# Patient Record
Sex: FEMALE | Race: WHITE | NOT HISPANIC OR LATINO | Employment: OTHER | URBAN - METROPOLITAN AREA
[De-identification: names, ages, dates, MRNs, and addresses within clinical notes are randomized per-mention and may not be internally consistent; named-entity substitution may affect disease eponyms.]

---

## 2017-01-25 ENCOUNTER — CONVERSION ENCOUNTER (OUTPATIENT)
Dept: MAMMOGRAPHY | Facility: HOSPITAL | Age: 67
End: 2017-01-25

## 2018-02-15 ENCOUNTER — CONVERSION ENCOUNTER (OUTPATIENT)
Dept: MAMMOGRAPHY | Facility: HOSPITAL | Age: 68
End: 2018-02-15

## 2018-04-20 ENCOUNTER — OFFICE VISIT CONVERTED (OUTPATIENT)
Dept: CARDIOLOGY | Facility: CLINIC | Age: 68
End: 2018-04-20
Attending: SPECIALIST

## 2018-11-16 ENCOUNTER — CONVERSION ENCOUNTER (OUTPATIENT)
Dept: OTHER | Facility: HOSPITAL | Age: 68
End: 2018-11-16

## 2018-11-16 ENCOUNTER — OFFICE VISIT CONVERTED (OUTPATIENT)
Dept: CARDIOLOGY | Facility: CLINIC | Age: 68
End: 2018-11-16
Attending: SPECIALIST

## 2019-03-26 ENCOUNTER — HOSPITAL ENCOUNTER (OUTPATIENT)
Dept: MAMMOGRAPHY | Facility: HOSPITAL | Age: 69
Discharge: HOME | End: 2019-03-26
Attending: OBSTETRICS & GYNECOLOGY

## 2019-04-16 ENCOUNTER — HOSPITAL ENCOUNTER (OUTPATIENT)
Dept: MAMMOGRAPHY | Facility: HOSPITAL | Age: 69
Discharge: HOME | End: 2019-04-16
Attending: OBSTETRICS & GYNECOLOGY

## 2019-04-22 ENCOUNTER — HOSPITAL ENCOUNTER (OUTPATIENT)
Dept: ULTRASOUND IMAGING | Facility: HOSPITAL | Age: 69
Discharge: HOME | End: 2019-04-22
Attending: OBSTETRICS & GYNECOLOGY

## 2019-04-26 ENCOUNTER — TELEPHONE (OUTPATIENT)
Dept: SURGERY | Facility: CLINIC | Age: 69
End: 2019-04-26

## 2019-04-26 DIAGNOSIS — D05.12 DUCTAL CARCINOMA IN SITU (DCIS) OF LEFT BREAST: Primary | ICD-10-CM

## 2019-04-26 NOTE — TELEPHONE ENCOUNTER
LM for pt to call,    New PT appt with Dr OWUSU  5/3/19 arrive 7:30am    Need to pre screen for possible breast mri      Mailed sarthak nayak

## 2019-04-26 NOTE — TELEPHONE ENCOUNTER
LM for pt to call    Scheduled Bilateral Breast MRI w wo contrast [facility] 4-29-19  Arrive 5:15pm    nael

## 2019-04-29 ENCOUNTER — HOSPITAL ENCOUNTER (OUTPATIENT)
Dept: MRI IMAGING | Facility: HOSPITAL | Age: 69
Discharge: HOME | End: 2019-04-29
Admitting: SURGERY

## 2019-04-29 DIAGNOSIS — D05.12 DUCTAL CARCINOMA IN SITU (DCIS) OF LEFT BREAST: ICD-10-CM

## 2019-04-29 PROCEDURE — C8908 MRI W/O FOL W/CONT, BREAST,: HCPCS

## 2019-04-29 PROCEDURE — A9577 INJ MULTIHANCE: HCPCS | Performed by: SURGERY

## 2019-04-29 PROCEDURE — C8937 CAD BREAST MRI: HCPCS

## 2019-04-29 PROCEDURE — 0 GADOBENATE DIMEGLUMINE 529 MG/ML SOLUTION: Performed by: SURGERY

## 2019-04-29 RX ADMIN — GADOBENATE DIMEGLUMINE 18 ML: 529 INJECTION, SOLUTION INTRAVENOUS at 18:34

## 2019-05-03 ENCOUNTER — PREP FOR SURGERY (OUTPATIENT)
Dept: OTHER | Facility: HOSPITAL | Age: 69
End: 2019-05-03

## 2019-05-03 ENCOUNTER — OFFICE VISIT (OUTPATIENT)
Dept: SURGERY | Facility: CLINIC | Age: 69
End: 2019-05-03

## 2019-05-03 VITALS
SYSTOLIC BLOOD PRESSURE: 172 MMHG | OXYGEN SATURATION: 97 % | BODY MASS INDEX: 35.78 KG/M2 | HEART RATE: 91 BPM | HEIGHT: 61 IN | DIASTOLIC BLOOD PRESSURE: 92 MMHG | WEIGHT: 189.5 LBS

## 2019-05-03 DIAGNOSIS — N64.89 BREAST ASYMMETRY: ICD-10-CM

## 2019-05-03 DIAGNOSIS — D05.12 DUCTAL CARCINOMA IN SITU OF LEFT BREAST: Primary | ICD-10-CM

## 2019-05-03 DIAGNOSIS — D05.12 DUCTAL CARCINOMA IN SITU (DCIS) OF LEFT BREAST: Primary | ICD-10-CM

## 2019-05-03 DIAGNOSIS — E66.9 OBESITY (BMI 35.0-39.9 WITHOUT COMORBIDITY): ICD-10-CM

## 2019-05-03 DIAGNOSIS — D05.12 BREAST NEOPLASM, TIS (DCIS), LEFT: Primary | ICD-10-CM

## 2019-05-03 DIAGNOSIS — Z80.3 FH: BREAST CANCER: ICD-10-CM

## 2019-05-03 DIAGNOSIS — I35.1 NONRHEUMATIC AORTIC VALVE INSUFFICIENCY: ICD-10-CM

## 2019-05-03 PROCEDURE — 99205 OFFICE O/P NEW HI 60 MIN: CPT | Performed by: SURGERY

## 2019-05-03 RX ORDER — SODIUM CHLORIDE, SODIUM LACTATE, POTASSIUM CHLORIDE, CALCIUM CHLORIDE 600; 310; 30; 20 MG/100ML; MG/100ML; MG/100ML; MG/100ML
100 INJECTION, SOLUTION INTRAVENOUS CONTINUOUS
Status: CANCELLED | OUTPATIENT
Start: 2019-06-13

## 2019-05-03 RX ORDER — MULTIPLE VITAMINS W/ MINERALS TAB 9MG-400MCG
1 TAB ORAL DAILY
COMMUNITY
End: 2019-06-13 | Stop reason: HOSPADM

## 2019-05-03 RX ORDER — ASPIRIN 81 MG/1
81 TABLET ORAL DAILY
COMMUNITY
End: 2019-06-13 | Stop reason: HOSPADM

## 2019-05-03 RX ORDER — SIMVASTATIN 40 MG
40 TABLET ORAL NIGHTLY
COMMUNITY
Start: 2019-02-14 | End: 2022-05-12

## 2019-05-03 RX ORDER — DIAZEPAM 5 MG/1
10 TABLET ORAL ONCE
Status: CANCELLED | OUTPATIENT
Start: 2019-06-13 | End: 2019-05-03

## 2019-05-03 RX ORDER — AMLODIPINE BESYLATE 2.5 MG/1
5 TABLET ORAL NIGHTLY
COMMUNITY
Start: 2019-02-14 | End: 2019-07-03

## 2019-05-03 RX ORDER — CEFAZOLIN SODIUM 2 G/100ML
2 INJECTION, SOLUTION INTRAVENOUS ONCE
Status: CANCELLED | OUTPATIENT
Start: 2019-06-13 | End: 2019-05-03

## 2019-05-03 NOTE — PROGRESS NOTES
Chief Complaint: Melina Martin is a 68 y.o. female who was seen in consultation at the request of Mere Pena DO  for newly diagnosed breast cancer    History of Present Illness:  Patient presents with newly diagnosed DCIS, LEFT breast.  She noted no new masses, skin changes, nipple discharge, nipple changes prior to her most recent imaging.    Her most recent imaging includes the followin/15/2018  [facility]  [place]  SCREENING BILATERAL MAMOGRAM WITH 3D TOMOSYNTHESIS  BIRADS 1 NEGATIVE  Scattered areas of fibroglandular density.    [facility]  [facility]ENING BILATERAL MAMOGRAM WITH 3D TOMOSYNTHESIS  FINDINGS:  5 mm asymmetry lower left breast, middle depth.  BIRADS 0  Scattered areas of fibroglandular density.    2019  [facility]  [place]  DIAGNOSTIC LEFT ANGELICA  US BREAST LEFT  FINDINGS:  Mammogram: Persistence of a 0.7 cm, high density, bandlike asymmetry in the medial left breast.  Ultrasound: Left breast 9 o’clock, 3 cm from nipple is a 0.7 x 0.9 cm elongated duct with internal debris that correlates with the mammographic lesion.  BI-RADS CATEGORY: 4A    19  [facility]  BILA BREAST MRI   MELINA MARTIN   Left breast 9 o’clock middle third 4 cm from the nipple metallic clip seen along the medial margin of the residual area clumped enhancement 1.4 cm anterior to posterior, 0.7 cm superior to inferior and 0.6 cm medial to lateral. No other areas left breast. No evidence for left axillary or internal mammary chain adenopathy. No areas of abnormal enhancement right breast.       She had a biopsy on the following day that showed:  2019  [facility]   Radiology  Melina [place]  ULTRASOUND BREAST BX  9 o’clock anterior medial left breast.  12 gauge Encor.  3 core biopsy specimens. A ribbon shaped clip. Positioning of the clip was confirmed.  ADDENDUM:  Tiny focus of possible microinvasive carcinoma. Result is  concordant.    04/22/2019  [facility]   Pathology  Melina [place]  Low grade ductal carcinoma in situ (DCIS), see comment:  - Size of largest focus: 4 mm  - Architectural patterns: Cribriform and mucinous  - Necrosis: Not identified  - Microcalcification: Not identified  o Estrogen receptor - 100%  o Progesterone receptor - 100%  A tiny focus of possible microinvasive mucinous carcinoma is present on the initial two levels examined inly. Deeper levels additionally demonstrate rare fragments of detached carcinoma within pools of mucin. These findings may represent mucinous carcinoma.    She has not had a breast biopsy in the past.  She has her uterus and ovaries, is postmenopausal, and takes nor hormones.  Her family history includes the following: She has 2 daughters, no sons, no sisters, 2 maternal aunts, no paternal aunts.  One maternal aunt had breast cancer in her 80s.  Her mother lived to be 101 and did not have malignancy.  She is here for evaluation.    Review of Systems:  Review of Systems   Eyes: Positive for eye problems.   Respiratory: Positive for wheezing.    Endocrine: Positive for hot flashes.   All other systems reviewed and are negative.       Past Medical and Surgical History:  Breast Biopsy History:  Patient had not had a breast biopsy prior to her cancer diagnosis.  Breast Cancer HIstory:  Patient does not have a past medical history of breast cancer.  Breast Operations, and year:  NONE   Obstetric/Gynecologic History:  Age menstrual periods began: 13  Patient is postmenopausal, entered menopause naturally at age: 7/2000   Number of pregnancies:2  Number of live births: 2  Number of abortions or miscarriages: 0  Age of delivery of first child: 28  Patient breast fed, for the following lenth of time: APPROX 1 YR   Length of time taking birth control pills: 7-10 YRS   Patient has never taken hormone replacement  PATIENT HAS BOTH OVARIES AND UTERUS.     Past Surgical History:   Procedure  "Laterality Date   • BREAST BIOPSY     •  SECTION     • OVARIAN CYST REMOVAL         Past Medical History:   Diagnosis Date   • Cataract    • Heart murmur    • HTN (hypertension)    • Hyperlipidemia        Prior Hospitalizations, other than for surgery or childbirth, and year:  NONE     Social History     Socioeconomic History   • Marital status: Single     Spouse name: Not on file   • Number of children: Not on file   • Years of education: Not on file   • Highest education level: Not on file   Tobacco Use   • Smoking status: Former Smoker     Types: Cigarettes     Last attempt to quit:      Years since quittin.3   • Smokeless tobacco: Never Used   Substance and Sexual Activity   • Alcohol use: Yes     Comment: occ. 1-2 month    • Drug use: No     Patient is .  Patient is retired.  Patient drinks 1-2 servings of caffeine per day.    Family History:  Family History   Problem Relation Age of Onset   • Colon cancer Mother 89   • Hearing loss Mother    • Hypertension Mother    • Hearing loss Father    • Heart attack Father    • Diabetes Brother    • Heart attack Brother    • Depression Daughter    • Breast cancer Maternal Aunt         UNKNOWN AGE    • Cancer Cousin         FEMALE CANCER OF UNKNOWN ORGIN 60S       Vital Signs:  /92 (BP Location: Right arm, Patient Position: Sitting, Cuff Size: Adult)   Pulse 91   Ht 154.3 cm (60.75\")   Wt 86 kg (189 lb 8 oz)   SpO2 97%   Breastfeeding? No   BMI 36.10 kg/m²      Medications:    Current Outpatient Medications:   •  amLODIPine (NORVASC) 2.5 MG tablet, , Disp: , Rfl:   •  aspirin 81 MG EC tablet, Take 81 mg by mouth Daily., Disp: , Rfl:   •  Biotin w/ Vitamins C & E (HAIR/SKIN/NAILS PO), Take  by mouth., Disp: , Rfl:   •  Coenzyme Q10 (CO Q 10 PO), Take  by mouth., Disp: , Rfl:   •  Misc Natural Products (OSTEO BI-FLEX/5-LOXIN ADVANCED PO), Take  by mouth., Disp: , Rfl:   •  Multiple Vitamins-Minerals (MULTIVITAMIN WITH MINERALS) tablet " "tablet, Take 1 tablet by mouth Daily., Disp: , Rfl:   •  Omega-3 Fatty Acids (OMEGA-3 FISH OIL PO), Take  by mouth. omegaplex, Disp: , Rfl:   •  simvastatin (ZOCOR) 40 MG tablet, , Disp: , Rfl:      Allergies:  Allergies   Allergen Reactions   • Codeine Other (See Comments)     Restless, unable to sleep       Physical Examination:  /92 (BP Location: Right arm, Patient Position: Sitting, Cuff Size: Adult)   Pulse 91   Ht 154.3 cm (60.75\")   Wt 86 kg (189 lb 8 oz)   SpO2 97%   Breastfeeding? No   BMI 36.10 kg/m²   General Appearance:  Patient is in no distress.  She is well kept and has an obese build.   Psychiatric:  Patient with appropriate mood and affect. Alert and oriented to self, time, and place.    Breast, RIGHT:  medium sized, asymmetric with the contralateral side, as below.  Breast skin is without erythema, edema, rashes.  There are no visible abnormalities upon inspection during the arm-raising maneuver or with hands on hips in the sitting position. There is no nipple retraction, discharge or nipple/areolar skin changes.There are no masses palpable in the sitting or supine positions.    Breast, LEFT:  small sized, asymmetric with the contralateral side, right breast is a 38B and left breast is a 38A.  There are mild ecchymoses left medial breast from her recent biopsy.  Breast skin is without erythema, edema, rashes.  There are no visible abnormalities upon inspection during the arm-raising maneuver or with hands on hips in the sitting position. There is no nipple retraction, discharge or nipple/areolar skin changes.There are no masses palpable in the sitting or supine positions.    Lymphatic:  There is no axillary, cervical, infraclavicular, or supraclavicular adenopathy bilaterally.  Eyes:  Pupils are round and reactive to light.  Cardiovascular:  Heart rate and rhythm are regular. There is a blowing murmur present.  Respiratory:  Lungs are clear bilaterally with no crackles or wheezes in any " lung field.  Gastrointestinal:  Abdomen is soft, nondistended, and nontender.     Musculoskeletal:  Good strength in all 4 extremities.   There is good range of motion in both shoulders.    Skin:  No new skin lesions or rashes on the skin excluding the breast (see breast exam above).        Imagin2017  [facility]  [place]  BIRADS 2 BENIGN  Scattered areas of fibroglandular density.    02/15/2018  [facility]  SCREENING BILATERAL MAMOGRAM WITH 3D TOMOSYNTHESIS  BIRADS 1 NEGATIVE  Scattered areas of fibroglandular density.    2019  [facility]  SCREENING BILATERAL MAMOGRAM WITH 3D TOMOSYNTHESIS  FINDINGS:  5 mm asymmetry lower left breast, middle depth.  BIRADS 0  Scattered areas of fibroglandular density.    2019  [facility]  DIAGNOSTIC LEFT ANGELICA  US BREAST LEFT  FINDINGS:  Mammogram: Persistence of a 0.7 cm, high density, bandlike asymmetry in the medial left breast.  Ultrasound: Left breast 9 o’clock, 3 cm from nipple is a 0.7 x 0.9 cm elongated duct with internal debris that correlates with the mammographic lesion.  BI-RADS CATEGORY: 4A    19  [facility]  BILA BREAST MRI   LIZBETH MELINA E   Left breast 9 o’clock middle third 4 cm from the nipple metallic clip seen along the medial margin of the residual area clumped enhancement 1.4 cm anterior to posterior, 0.7 cm superior to inferior and 0.6 cm medial to lateral. No other areas left breast. No evidence for left axillary or internal mammary chain adenopathy. No areas of abnormal enhancement right breast.     Pathology:  2019  [facility]   Radiology  [place]  ULTRASOUND BREAST BX  9 o’clock anterior medial left breast.  12 gauge Encor.  3 core biopsy specimens. A ribbon shaped clip. Positioning of the clip was confirmed.  ADDENDUM:  Tiny focus of possible microinvasive carcinoma. Result is concordant.    2019  [place]  [facility]   Pathology  Melina Martin  Low grade ductal carcinoma in situ (DCIS), see comment:  - Size of largest focus: 4 mm  - Architectural patterns: Cribriform and mucinous  - Necrosis: Not identified  - Microcalcification: Not identified  o Estrogen receptor - 100%  o Progesterone receptor - 100%  A tiny focus of possible microinvasive mucinous carcinoma is present on the initial two levels examined inly. Deeper levels additionally demonstrate rare fragments of detached carcinoma within pools of mucin. These findings may represent mucinous carcinoma.    Procedures:      Assessment:   Diagnosis Plan   1. Ductal carcinoma in situ (DCIS) of left breast  Ambulatory Referral to Plastic Surgery   2. FH: breast cancer     3. Obesity (BMI 35.0-39.9 without comorbidity)     4. Breast asymmetry  Ambulatory Referral to Plastic Surgery   5. Nonrheumatic aortic valve insufficiency       1-  LEFT breast 9:00, 3.5 CFN- 9mm US, 7mm mammogram, 1.4 cm MRI  Low-grade ductal carcinoma in situ, 4 mm and a core, could perform a mucinous, versus microinvasive mucinous carcinoma.  Estrogen 100%, progesterone 100%.  Ribbon marker left in good position.  Clinical stage T IS, N0 stage 0.    2-  1 of 2 MA in her 80s. Mother lived until 100 with no breast ca        3-  BMI 36    4-  RIGHT larger than LEFT by a cup- 38 B versus 38A    5-  Dr Jones in [facility] is cardiologist.    Plan:  Melina, her daughter and I reviewed her interval history, imaging, imaging reports, pathology reports and examination together today.  We reviewed the difference in systemic therapy versus locoregional. We discussed that she will be seeing a medical oncologist in the adjuvant setting. We discussed that for DCIS, that we would expect a 98% breast cancer specific survival and that the medications offered by medical oncology are for risk reduction for additional breast cancers and for a reduction in ipsilateral locoregional recurrence.     We discussed that  for unifocal DCIS, there are 2 options for locoregional treatment that have equivalent survival: total mastectomy versus lumpectomy and radiation, the former with sentinel node biopsy.     She is interested in breast conservation.     We discussed the option of oncoplastic closure with contralateral mastopexy, and she is  interested in this, in light of her baseline asymmetry, where the unaffected breast is larger.  We will arrange for her to talk with plastic surgery about an oncoplastic closure and a contralateral mastopexy for symmetry.      We discussed the following procedure:  Left breast needle localized lumpectomy, followed by bilateral oncoplastics.    She understands the risks of lumpectomy including bleeding, infection, seroma and 25% chance of positive margins.    We discussed that if she were found to have an invasive colloid tumor, that it is likely we would not return for sentinel nodes.  If there was a mixed or ductal cancer than we may.    NCCN guidelines have been followed.  We fitted her for a postoperative bra today.    She will receive a recommendation for radiation after surgery.    She is going to check and see if she would be interested in seeing Dr. Muniz postoperatively to discuss hormone blocking medication.  With regards to her being overweight we did discuss that with a heavily hormone fed tumor that we recommend weight loss as well as hormone blocking medication to reduce the risk of recurrence or second primary.    We did discuss her family history and the option for genetic testing and she is not interested in that at this time.    She is pleased with her excellent prognosis and we will get her set up for surgery.      Beatriz Fuentes MD    - addendum- note from her cardiologist Dr Hayes Jones in [place]-   Non-rheumatic aortic insufficiency, stable. Continue losartan, simvastatin.RTC 6mo    We have spent 60 minutes in visit today, 45 in face to face  .      Next Appointment:  Return for surgery.      EMR Dragon/transcription disclaimer:    Much of this encounter note is an electronic transcription/translocation of spoken language to printed text.  The electronic translation of spoken language may permit erroneous, or at times, nonsensical words or phrases to be inadvertently transcribed.  Although I have reviewed the note from such areas, some may still exist.

## 2019-05-09 ENCOUNTER — TRANSCRIBE ORDERS (OUTPATIENT)
Dept: SURGERY | Facility: CLINIC | Age: 69
End: 2019-05-09

## 2019-05-09 DIAGNOSIS — D05.12 DUCTAL CARCINOMA IN SITU OF LEFT BREAST: Primary | ICD-10-CM

## 2019-05-10 ENCOUNTER — TELEPHONE (OUTPATIENT)
Dept: SURGERY | Facility: CLINIC | Age: 69
End: 2019-05-10

## 2019-05-10 PROBLEM — D05.12 BREAST NEOPLASM, TIS (DCIS), LEFT: Status: ACTIVE | Noted: 2019-05-10

## 2019-05-10 NOTE — TELEPHONE ENCOUNTER
Scheduled Consult with Dr Trinidad  5-23-19 @ 8:00    Scheduled Surgery Main OR  5-29-19  Left Breast Needle Localized Lumpectomy, Possible Oncoplastics Dr Trinidad        Arrive            7:30  NL                9:30  Surgery      11:0.0      PAT 5-20-19 @ 1:30  Return to see Dr OWUSU 6-10-19 @ arrive 8:45      LM for pt to call me.  nael    Pt called and confirmed appts  nale

## 2019-05-20 ENCOUNTER — TELEPHONE (OUTPATIENT)
Dept: OTHER | Facility: HOSPITAL | Age: 69
End: 2019-05-20

## 2019-05-20 ENCOUNTER — APPOINTMENT (OUTPATIENT)
Dept: PREADMISSION TESTING | Facility: HOSPITAL | Age: 69
End: 2019-05-20

## 2019-05-20 DIAGNOSIS — D05.12 DUCTAL CARCINOMA IN SITU OF LEFT BREAST: ICD-10-CM

## 2019-05-20 LAB
ANION GAP SERPL CALCULATED.3IONS-SCNC: 11.5 MMOL/L
BUN BLD-MCNC: 11 MG/DL (ref 8–23)
BUN/CREAT SERPL: 15.3 (ref 7–25)
CALCIUM SPEC-SCNC: 9.1 MG/DL (ref 8.6–10.5)
CHLORIDE SERPL-SCNC: 99 MMOL/L (ref 98–107)
CO2 SERPL-SCNC: 28.5 MMOL/L (ref 22–29)
CREAT BLD-MCNC: 0.72 MG/DL (ref 0.57–1)
DEPRECATED RDW RBC AUTO: 41.2 FL (ref 37–54)
ERYTHROCYTE [DISTWIDTH] IN BLOOD BY AUTOMATED COUNT: 13.2 % (ref 12.3–15.4)
GFR SERPL CREATININE-BSD FRML MDRD: 81 ML/MIN/1.73
GLUCOSE BLD-MCNC: 204 MG/DL (ref 65–99)
HCT VFR BLD AUTO: 46.9 % (ref 34–46.6)
HGB BLD-MCNC: 15.2 G/DL (ref 12–15.9)
MCH RBC QN AUTO: 27.9 PG (ref 26.6–33)
MCHC RBC AUTO-ENTMCNC: 32.4 G/DL (ref 31.5–35.7)
MCV RBC AUTO: 86.2 FL (ref 79–97)
PLATELET # BLD AUTO: 300 10*3/MM3 (ref 140–450)
PMV BLD AUTO: 9.5 FL (ref 6–12)
POTASSIUM BLD-SCNC: 4.6 MMOL/L (ref 3.5–5.2)
RBC # BLD AUTO: 5.44 10*6/MM3 (ref 3.77–5.28)
SODIUM BLD-SCNC: 139 MMOL/L (ref 136–145)
WBC NRBC COR # BLD: 6.72 10*3/MM3 (ref 3.4–10.8)

## 2019-05-20 PROCEDURE — 85027 COMPLETE CBC AUTOMATED: CPT | Performed by: SURGERY

## 2019-05-20 PROCEDURE — 80048 BASIC METABOLIC PNL TOTAL CA: CPT | Performed by: SURGERY

## 2019-05-20 PROCEDURE — 93010 ELECTROCARDIOGRAM REPORT: CPT | Performed by: INTERNAL MEDICINE

## 2019-05-20 PROCEDURE — 36415 COLL VENOUS BLD VENIPUNCTURE: CPT

## 2019-05-20 PROCEDURE — 93005 ELECTROCARDIOGRAM TRACING: CPT

## 2019-05-20 RX ORDER — RANITIDINE HCL 75 MG
150 TABLET ORAL EVERY MORNING
COMMUNITY
End: 2020-11-04

## 2019-05-21 ENCOUNTER — TELEPHONE (OUTPATIENT)
Dept: OTHER | Facility: HOSPITAL | Age: 69
End: 2019-05-21

## 2019-05-21 ENCOUNTER — TELEPHONE (OUTPATIENT)
Dept: SURGERY | Facility: CLINIC | Age: 69
End: 2019-05-21

## 2019-05-21 NOTE — TELEPHONE ENCOUNTER
Referral received from Dr. Fuentes's office. I called Melina and introduced myself. She was in the middle of an appointment and could not talk. She asked if I could call back later. I will reach back out at a later time.

## 2019-05-21 NOTE — TELEPHONE ENCOUNTER
Preoperative EKG showed the following:  - ABNORMAL ECG -  Sinus rhythm  RBBB and LAFB  Left ventricular hypertrophy      We will contact her cardiologist Dr Rahman and fax over the EKG so he can have a look.

## 2019-05-21 NOTE — TELEPHONE ENCOUNTER
I called Melina back today and caught her at a good time. I introduced myself and navigational services. She states the plan is to have a lumpectomy on May 29th and will see radiation and medical oncology postoperatively in [place]. She had no questions or concerns with her treatment plan. She stated she felt very comfortable with Dr. Fuentes and was confident in her treatment plan.      We discussed integrative therapies and other services at [facility] including the opportunity to speak with our Oncology Dietitian or Oncology Social Worker. She verbalized interest in receiving a navigation folder outlining services. I verified her mailing address and will send out a navigation folder with the following information:     [facility] Cancer Support Network,  Sharing Our Stories Breast Cancer Support Group, Cancer and Restorative Exercise (CARE), [facility] Exercise program, Together for Breast Cancer Survival,  For Women Facing Breast Cancer, Bio[employer], Cancer Resource [place], Massage Therapy, Reiki Therapy, [employer] [place], Cancer Nutrition, Survivorship Clinic, [facility], Survivorship conference.      She stated she does not like to come to [place] unless she has to, but she was interested in learning about the resources anyway. She verbalized appreciation for navigational services and she has my contact information and will call with any questions that arise.

## 2019-05-22 ENCOUNTER — TELEPHONE (OUTPATIENT)
Dept: SURGERY | Facility: CLINIC | Age: 69
End: 2019-05-22

## 2019-05-22 NOTE — TELEPHONE ENCOUNTER
Requested Dr. Jones review abnormal pre-op EKG.     Carroll @ [phone] will call me back before end of week to confirm is or isn't okay to proceed. lml

## 2019-05-24 ENCOUNTER — TELEPHONE (OUTPATIENT)
Dept: SURGERY | Facility: CLINIC | Age: 69
End: 2019-05-24

## 2019-05-24 ENCOUNTER — TRANSCRIBE ORDERS (OUTPATIENT)
Dept: ADMINISTRATIVE | Facility: HOSPITAL | Age: 69
End: 2019-05-24

## 2019-05-24 DIAGNOSIS — C50.912 MALIGNANT NEOPLASM OF LEFT FEMALE BREAST, UNSPECIFIED ESTROGEN RECEPTOR STATUS, UNSPECIFIED SITE OF BREAST (HCC): Primary | ICD-10-CM

## 2019-05-24 NOTE — TELEPHONE ENCOUNTER
Received call from Padmini mcneil at Dr Jones office,     Patient is not cleared to have surgery next Wednesday-she has to have a stress test first. Dr Jones is out next week.

## 2019-05-24 NOTE — TELEPHONE ENCOUNTER
Patient surgery had to be rescheduled due to her having to have cardio work up.    Her surgery is now on  6-19-19 Main OR    Left Breast NL Lumpectomy, Possible Oncplastics Dr Trinidad      Arrive     5;30  NL          7:30  Surgery 9:00    PAT 6-10-19 @ 12:30    Return to see  H7-1-19 @ 11:30    Scheduled with Doreen Pope to [place] we tried to get case on 7-12-19  Radiology did not have a 7:30 NL available.  And Dr Trinidad has a long case that afternoon.      kdl

## 2019-05-28 ENCOUNTER — TELEPHONE (OUTPATIENT)
Dept: SURGERY | Facility: CLINIC | Age: 69
End: 2019-05-28

## 2019-05-28 NOTE — TELEPHONE ENCOUNTER
Message for patient to call me back   Need to know what pain medications she has tolerated in the past.     I need let her know that Dr. Fuentes recommends that she consider allowing Dr Trinidad to work with us, in light of her baseline asymmetry of her breast size.       Patient agreed to call me back. Lml         Patient let me know she has tolerate hydrocodone in the past.   She also let me know she is not willing to have plastics, she understands that we would like for her to consider for asymmetry and breast size. She tells me that she has considered this at length and it is not important to her.  Lml

## 2019-05-29 ENCOUNTER — APPOINTMENT (OUTPATIENT)
Dept: MAMMOGRAPHY | Facility: HOSPITAL | Age: 69
End: 2019-05-29

## 2019-05-29 ENCOUNTER — TELEPHONE (OUTPATIENT)
Dept: SURGERY | Facility: CLINIC | Age: 69
End: 2019-05-29

## 2019-05-29 NOTE — TELEPHONE ENCOUNTER
Patient's surgery has as been moved up,  Scheduled Left Breast Needle Localized Lumpectomy, 6-13-19 [facility]    Arrive         5:45  NL             7:30  Surgery    9:00    PAT 6-5-19 @ 9:30  Post Op 7-3-19 arrive 11:15    Patient is aware of all appts  kdl

## 2019-06-04 ENCOUNTER — TELEPHONE (OUTPATIENT)
Dept: SURGERY | Facility: CLINIC | Age: 69
End: 2019-06-04

## 2019-06-04 NOTE — TELEPHONE ENCOUNTER
Confirmed with Dr. Jones's office  Cardio stress test scheduled Thursday 6/6/19   If all well they will clear her for surgery same day.     I will check back with his office   [phone].     lml       Requested cardio clearance. Dr. Jones's office explained to me that patient having Echo today. He would review and let us know this afternoon. lml

## 2019-06-05 ENCOUNTER — APPOINTMENT (OUTPATIENT)
Dept: PREADMISSION TESTING | Facility: HOSPITAL | Age: 69
End: 2019-06-05

## 2019-06-05 VITALS
SYSTOLIC BLOOD PRESSURE: 153 MMHG | TEMPERATURE: 97 F | HEIGHT: 65 IN | HEART RATE: 96 BPM | RESPIRATION RATE: 16 BRPM | DIASTOLIC BLOOD PRESSURE: 84 MMHG | BODY MASS INDEX: 32.03 KG/M2 | WEIGHT: 192.25 LBS | OXYGEN SATURATION: 98 %

## 2019-06-10 ENCOUNTER — CONVERSION ENCOUNTER (OUTPATIENT)
Dept: CARDIOLOGY | Facility: CLINIC | Age: 69
End: 2019-06-10
Attending: SPECIALIST

## 2019-06-10 ENCOUNTER — APPOINTMENT (OUTPATIENT)
Dept: PREADMISSION TESTING | Facility: HOSPITAL | Age: 69
End: 2019-06-10

## 2019-06-13 ENCOUNTER — HOSPITAL ENCOUNTER (OUTPATIENT)
Dept: MAMMOGRAPHY | Facility: HOSPITAL | Age: 69
Discharge: HOME | End: 2019-06-13

## 2019-06-13 ENCOUNTER — ANESTHESIA (OUTPATIENT)
Dept: PERIOP | Facility: HOSPITAL | Age: 69
End: 2019-06-13

## 2019-06-13 ENCOUNTER — HOSPITAL ENCOUNTER (OUTPATIENT)
Facility: HOSPITAL | Age: 69
Setting detail: HOSPITAL OUTPATIENT SURGERY
Discharge: HOME | End: 2019-06-13
Attending: SURGERY | Admitting: SURGERY

## 2019-06-13 ENCOUNTER — APPOINTMENT (OUTPATIENT)
Dept: GENERAL RADIOLOGY | Facility: HOSPITAL | Age: 69
End: 2019-06-13

## 2019-06-13 ENCOUNTER — ANESTHESIA EVENT (OUTPATIENT)
Dept: PERIOP | Facility: HOSPITAL | Age: 69
End: 2019-06-13

## 2019-06-13 VITALS
DIASTOLIC BLOOD PRESSURE: 76 MMHG | OXYGEN SATURATION: 97 % | SYSTOLIC BLOOD PRESSURE: 126 MMHG | RESPIRATION RATE: 16 BRPM | TEMPERATURE: 98.5 F | HEART RATE: 86 BPM

## 2019-06-13 DIAGNOSIS — C50.912 MALIGNANT NEOPLASM OF LEFT FEMALE BREAST, UNSPECIFIED ESTROGEN RECEPTOR STATUS, UNSPECIFIED SITE OF BREAST (HCC): ICD-10-CM

## 2019-06-13 DIAGNOSIS — D05.12 BREAST NEOPLASM, TIS (DCIS), LEFT: ICD-10-CM

## 2019-06-13 PROCEDURE — 25010000002 PHENYLEPHRINE PER 1 ML: Performed by: NURSE ANESTHETIST, CERTIFIED REGISTERED

## 2019-06-13 PROCEDURE — 88342 IMHCHEM/IMCYTCHM 1ST ANTB: CPT | Performed by: SURGERY

## 2019-06-13 PROCEDURE — 19301 PARTIAL MASTECTOMY: CPT | Performed by: SURGERY

## 2019-06-13 PROCEDURE — 25010000003 CEFAZOLIN IN DEXTROSE 2-4 GM/100ML-% SOLUTION: Performed by: SURGERY

## 2019-06-13 PROCEDURE — 25010000002 ONDANSETRON PER 1 MG: Performed by: NURSE ANESTHETIST, CERTIFIED REGISTERED

## 2019-06-13 PROCEDURE — 76098 X-RAY EXAM SURGICAL SPECIMEN: CPT

## 2019-06-13 PROCEDURE — 25010000002 FENTANYL CITRATE (PF) 100 MCG/2ML SOLUTION: Performed by: NURSE ANESTHETIST, CERTIFIED REGISTERED

## 2019-06-13 PROCEDURE — 25010000002 PROPOFOL 10 MG/ML EMULSION: Performed by: NURSE ANESTHETIST, CERTIFIED REGISTERED

## 2019-06-13 PROCEDURE — 25010000002 DEXAMETHASONE PER 1 MG: Performed by: NURSE ANESTHETIST, CERTIFIED REGISTERED

## 2019-06-13 PROCEDURE — 88341 IMHCHEM/IMCYTCHM EA ADD ANTB: CPT | Performed by: SURGERY

## 2019-06-13 PROCEDURE — 25010000002 MIDAZOLAM PER 1 MG: Performed by: ANESTHESIOLOGY

## 2019-06-13 PROCEDURE — S0260 H&P FOR SURGERY: HCPCS | Performed by: SURGERY

## 2019-06-13 PROCEDURE — 88307 TISSUE EXAM BY PATHOLOGIST: CPT | Performed by: SURGERY

## 2019-06-13 RX ORDER — ONDANSETRON 2 MG/ML
4 INJECTION INTRAMUSCULAR; INTRAVENOUS ONCE AS NEEDED
Status: DISCONTINUED | OUTPATIENT
Start: 2019-06-13 | End: 2019-06-13 | Stop reason: HOSPADM

## 2019-06-13 RX ORDER — PROMETHAZINE HYDROCHLORIDE 25 MG/1
25 TABLET ORAL ONCE AS NEEDED
Status: DISCONTINUED | OUTPATIENT
Start: 2019-06-13 | End: 2019-06-13 | Stop reason: HOSPADM

## 2019-06-13 RX ORDER — DIAZEPAM 5 MG/1
10 TABLET ORAL ONCE
Status: COMPLETED | OUTPATIENT
Start: 2019-06-13 | End: 2019-06-13

## 2019-06-13 RX ORDER — MIDAZOLAM HYDROCHLORIDE 1 MG/ML
2 INJECTION INTRAMUSCULAR; INTRAVENOUS
Status: DISCONTINUED | OUTPATIENT
Start: 2019-06-13 | End: 2019-06-13 | Stop reason: HOSPADM

## 2019-06-13 RX ORDER — SODIUM CHLORIDE 0.9 % (FLUSH) 0.9 %
1-10 SYRINGE (ML) INJECTION AS NEEDED
Status: DISCONTINUED | OUTPATIENT
Start: 2019-06-13 | End: 2019-06-13 | Stop reason: HOSPADM

## 2019-06-13 RX ORDER — SODIUM CHLORIDE, SODIUM LACTATE, POTASSIUM CHLORIDE, CALCIUM CHLORIDE 600; 310; 30; 20 MG/100ML; MG/100ML; MG/100ML; MG/100ML
100 INJECTION, SOLUTION INTRAVENOUS CONTINUOUS
Status: DISCONTINUED | OUTPATIENT
Start: 2019-06-13 | End: 2019-06-13 | Stop reason: HOSPADM

## 2019-06-13 RX ORDER — PROMETHAZINE HYDROCHLORIDE 25 MG/1
25 SUPPOSITORY RECTAL ONCE AS NEEDED
Status: DISCONTINUED | OUTPATIENT
Start: 2019-06-13 | End: 2019-06-13 | Stop reason: HOSPADM

## 2019-06-13 RX ORDER — DEXAMETHASONE SODIUM PHOSPHATE 10 MG/ML
INJECTION INTRAMUSCULAR; INTRAVENOUS AS NEEDED
Status: DISCONTINUED | OUTPATIENT
Start: 2019-06-13 | End: 2019-06-13 | Stop reason: SURG

## 2019-06-13 RX ORDER — FAMOTIDINE 10 MG/ML
20 INJECTION, SOLUTION INTRAVENOUS ONCE
Status: COMPLETED | OUTPATIENT
Start: 2019-06-13 | End: 2019-06-13

## 2019-06-13 RX ORDER — MIDAZOLAM HYDROCHLORIDE 1 MG/ML
1 INJECTION INTRAMUSCULAR; INTRAVENOUS
Status: DISCONTINUED | OUTPATIENT
Start: 2019-06-13 | End: 2019-06-13 | Stop reason: HOSPADM

## 2019-06-13 RX ORDER — FLUMAZENIL 0.1 MG/ML
0.2 INJECTION INTRAVENOUS AS NEEDED
Status: DISCONTINUED | OUTPATIENT
Start: 2019-06-13 | End: 2019-06-13 | Stop reason: HOSPADM

## 2019-06-13 RX ORDER — SODIUM CHLORIDE, SODIUM LACTATE, POTASSIUM CHLORIDE, CALCIUM CHLORIDE 600; 310; 30; 20 MG/100ML; MG/100ML; MG/100ML; MG/100ML
9 INJECTION, SOLUTION INTRAVENOUS CONTINUOUS
Status: DISCONTINUED | OUTPATIENT
Start: 2019-06-13 | End: 2019-06-13 | Stop reason: HOSPADM

## 2019-06-13 RX ORDER — FENTANYL CITRATE 50 UG/ML
INJECTION, SOLUTION INTRAMUSCULAR; INTRAVENOUS AS NEEDED
Status: DISCONTINUED | OUTPATIENT
Start: 2019-06-13 | End: 2019-06-13 | Stop reason: SURG

## 2019-06-13 RX ORDER — OXYCODONE AND ACETAMINOPHEN 7.5; 325 MG/1; MG/1
1 TABLET ORAL EVERY 4 HOURS PRN
Status: CANCELLED | OUTPATIENT
Start: 2019-06-13 | End: 2019-06-23

## 2019-06-13 RX ORDER — PROMETHAZINE HYDROCHLORIDE 25 MG/ML
6.25 INJECTION, SOLUTION INTRAMUSCULAR; INTRAVENOUS ONCE AS NEEDED
Status: DISCONTINUED | OUTPATIENT
Start: 2019-06-13 | End: 2019-06-13 | Stop reason: HOSPADM

## 2019-06-13 RX ORDER — SODIUM CHLORIDE 0.9 % (FLUSH) 0.9 %
3 SYRINGE (ML) INJECTION EVERY 12 HOURS SCHEDULED
Status: DISCONTINUED | OUTPATIENT
Start: 2019-06-13 | End: 2019-06-13 | Stop reason: HOSPADM

## 2019-06-13 RX ORDER — HYDROCODONE BITARTRATE AND ACETAMINOPHEN 7.5; 325 MG/1; MG/1
1 TABLET ORAL ONCE AS NEEDED
Status: COMPLETED | OUTPATIENT
Start: 2019-06-13 | End: 2019-06-13

## 2019-06-13 RX ORDER — ONDANSETRON 2 MG/ML
INJECTION INTRAMUSCULAR; INTRAVENOUS AS NEEDED
Status: DISCONTINUED | OUTPATIENT
Start: 2019-06-13 | End: 2019-06-13 | Stop reason: SURG

## 2019-06-13 RX ORDER — HYDROMORPHONE HYDROCHLORIDE 1 MG/ML
0.5 INJECTION, SOLUTION INTRAMUSCULAR; INTRAVENOUS; SUBCUTANEOUS
Status: DISCONTINUED | OUTPATIENT
Start: 2019-06-13 | End: 2019-06-13 | Stop reason: HOSPADM

## 2019-06-13 RX ORDER — CEFAZOLIN SODIUM 2 G/100ML
2 INJECTION, SOLUTION INTRAVENOUS ONCE
Status: COMPLETED | OUTPATIENT
Start: 2019-06-13 | End: 2019-06-13

## 2019-06-13 RX ORDER — LIDOCAINE HYDROCHLORIDE 10 MG/ML
0.5 INJECTION, SOLUTION EPIDURAL; INFILTRATION; INTRACAUDAL; PERINEURAL ONCE AS NEEDED
Status: DISCONTINUED | OUTPATIENT
Start: 2019-06-13 | End: 2019-06-13 | Stop reason: HOSPADM

## 2019-06-13 RX ORDER — LIDOCAINE HYDROCHLORIDE 10 MG/ML
3 INJECTION, SOLUTION INFILTRATION; PERINEURAL ONCE
Status: COMPLETED | OUTPATIENT
Start: 2019-06-13 | End: 2019-06-13

## 2019-06-13 RX ORDER — EPHEDRINE SULFATE 50 MG/ML
5 INJECTION, SOLUTION INTRAVENOUS ONCE AS NEEDED
Status: DISCONTINUED | OUTPATIENT
Start: 2019-06-13 | End: 2019-06-13 | Stop reason: HOSPADM

## 2019-06-13 RX ORDER — PROPOFOL 10 MG/ML
VIAL (ML) INTRAVENOUS AS NEEDED
Status: DISCONTINUED | OUTPATIENT
Start: 2019-06-13 | End: 2019-06-13 | Stop reason: SURG

## 2019-06-13 RX ORDER — FENTANYL CITRATE 50 UG/ML
100 INJECTION, SOLUTION INTRAMUSCULAR; INTRAVENOUS
Status: DISCONTINUED | OUTPATIENT
Start: 2019-06-13 | End: 2019-06-13 | Stop reason: HOSPADM

## 2019-06-13 RX ORDER — FENTANYL CITRATE 50 UG/ML
50 INJECTION, SOLUTION INTRAMUSCULAR; INTRAVENOUS
Status: DISCONTINUED | OUTPATIENT
Start: 2019-06-13 | End: 2019-06-13 | Stop reason: HOSPADM

## 2019-06-13 RX ORDER — LIDOCAINE HYDROCHLORIDE 20 MG/ML
INJECTION, SOLUTION INFILTRATION; PERINEURAL AS NEEDED
Status: DISCONTINUED | OUTPATIENT
Start: 2019-06-13 | End: 2019-06-13 | Stop reason: SURG

## 2019-06-13 RX ADMIN — FAMOTIDINE 20 MG: 10 INJECTION INTRAVENOUS at 08:32

## 2019-06-13 RX ADMIN — FENTANYL CITRATE 25 MCG: 50 INJECTION INTRAMUSCULAR; INTRAVENOUS at 09:06

## 2019-06-13 RX ADMIN — ONDANSETRON 4 MG: 2 INJECTION INTRAMUSCULAR; INTRAVENOUS at 09:33

## 2019-06-13 RX ADMIN — PHENYLEPHRINE HYDROCHLORIDE 100 MCG: 10 INJECTION INTRAVENOUS at 09:39

## 2019-06-13 RX ADMIN — PHENYLEPHRINE HYDROCHLORIDE 150 MCG: 10 INJECTION INTRAVENOUS at 09:18

## 2019-06-13 RX ADMIN — PHENYLEPHRINE HYDROCHLORIDE 100 MCG: 10 INJECTION INTRAVENOUS at 09:09

## 2019-06-13 RX ADMIN — PHENYLEPHRINE HYDROCHLORIDE 150 MCG: 10 INJECTION INTRAVENOUS at 09:22

## 2019-06-13 RX ADMIN — PHENYLEPHRINE HYDROCHLORIDE 150 MCG: 10 INJECTION INTRAVENOUS at 09:27

## 2019-06-13 RX ADMIN — CEFAZOLIN SODIUM 2 G: 2 INJECTION, SOLUTION INTRAVENOUS at 08:50

## 2019-06-13 RX ADMIN — MIDAZOLAM 2 MG: 1 INJECTION INTRAMUSCULAR; INTRAVENOUS at 08:32

## 2019-06-13 RX ADMIN — PROPOFOL 180 MG: 10 INJECTION, EMULSION INTRAVENOUS at 08:48

## 2019-06-13 RX ADMIN — PHENYLEPHRINE HYDROCHLORIDE 150 MCG: 10 INJECTION INTRAVENOUS at 09:33

## 2019-06-13 RX ADMIN — DEXAMETHASONE SODIUM PHOSPHATE 10 MG: 10 INJECTION INTRAMUSCULAR; INTRAVENOUS at 08:58

## 2019-06-13 RX ADMIN — FENTANYL CITRATE 25 MCG: 50 INJECTION INTRAMUSCULAR; INTRAVENOUS at 09:01

## 2019-06-13 RX ADMIN — LIDOCAINE HYDROCHLORIDE 100 MG: 20 INJECTION, SOLUTION INFILTRATION; PERINEURAL at 08:48

## 2019-06-13 RX ADMIN — LIDOCAINE HYDROCHLORIDE 3 ML: 10 INJECTION, SOLUTION INFILTRATION; PERINEURAL at 07:57

## 2019-06-13 RX ADMIN — SODIUM CHLORIDE, POTASSIUM CHLORIDE, SODIUM LACTATE AND CALCIUM CHLORIDE: 600; 310; 30; 20 INJECTION, SOLUTION INTRAVENOUS at 08:45

## 2019-06-13 RX ADMIN — SODIUM CHLORIDE, POTASSIUM CHLORIDE, SODIUM LACTATE AND CALCIUM CHLORIDE 100 ML/HR: 600; 310; 30; 20 INJECTION, SOLUTION INTRAVENOUS at 08:32

## 2019-06-13 RX ADMIN — HYDROCODONE BITARTRATE AND ACETAMINOPHEN 1 TABLET: 7.5; 325 TABLET ORAL at 10:55

## 2019-06-13 RX ADMIN — DIAZEPAM 10 MG: 5 TABLET ORAL at 06:05

## 2019-06-13 NOTE — ANESTHESIA PROCEDURE NOTES
Airway  Urgency: elective    Airway not difficult    General Information and Staff    Patient location during procedure: OR  Anesthesiologist: Joo Oliva MD  CRNA: Carolyn Joyner CRNA    Indications and Patient Condition  Indications for airway management: airway protection    Preoxygenated: yes  Mask difficulty assessment: 1 - vent by mask    Final Airway Details  Final airway type: supraglottic airway      Successful airway: unique  Size 4    Number of attempts at approach: 1    Additional Comments  Preoxygenated with 100% FiO2. Smooth IV induction. Atraumatic insertion. No change to dentition or soft tissue.

## 2019-06-13 NOTE — OP NOTE
Operative note    Preoperative Diagnosis: Breast cancer    Postoperative Diagnosis: Breast cancer    Procedure Performed: left needle localized lumpectomy     Dictating physician and surgeon: Beatriz Fuentes MD    EBL:2cc    FINDINGS AND DESCRIPTION OF PROCEDURE:     The patient was brought to the operating room and placed on the table in the supine position. After adequate general LMA anesthesia was obtained, we sterilly prepped and draped the breast and axilla. We did a time out to identify correct patient and correct operative site.  She did receive IV antibiotic within an hour of and prior to incision.       I marked the intended area for resection and planned my incision based on the mammographic localization imaging. The localization need was the following length: 3cm  I used a LEFT medial radial incision near 9:00 inner ring.   I included an ellipse of skin overlying the tumor for margins and orientation.    I used a 15 blade to incise the skin. I then dissected using bovie electrocautery superiorly, inferiorly, medially and laterally around the lesion.  I examined the specimen using the intraoperativefaxitron to document the presence of the lesion within the specimen.I then took the following additional margins: superior, inferior, medial, lateral anterior, posterior.     I then passed the specimens and labelled as follows: For the lumpectomy,  long stitch lateral short stitch superior, double stitch deep. For the margins, stitch marks true margin.    I then irrigated, assured hemostasis.  I then mobilized the breast posteriorly and closed a deep layer of breast tissue using an interrupted 2-0 vicryl, followed by a superficial layer of breast using an interrupted  2-0 vicryl. I then closed the skin in 2 layers- the first being n interrupted 3-0 vicryl layer, followed by a running 4-0 monocryl.    I then applied lengthwise 1/2 inch steri strips, an island dressing.  Then I wrapped her in 4x4s and a 6 inch  ACE wrap.    She tolerated the procedure well, there were no immediate complications, and all counts were correct at the end of the case.

## 2019-06-13 NOTE — H&P
There are no changes in the history or physical exam, aside from any that are listed as an addendum at the bottom of this document.   Today, patient will go for the surgery listed in the plan below.    Chief Complaint: Melina Martin is a 68 y.o. female who was seen in consultation at the request of Meer Pena DO  for newly diagnosed breast cancer    History of Present Illness:  Patient presents with newly diagnosed DCIS, LEFT breast.  She noted no new masses, skin changes, nipple discharge, nipple changes prior to her most recent imaging.    Her most recent imaging includes the followin/15/2018  [facility]  Melina Malvern  SCREENING BILATERAL MAMOGRAM WITH 3D TOMOSYNTHESIS  BIRADS 1 NEGATIVE  Scattered areas of fibroglandular density.    2019  [facility]  SCREENING BILATERAL MAMOGRAM WITH 3D TOMOSYNTHESIS  FINDINGS:  5 mm asymmetry lower left breast, middle depth.  BIRADS 0  Scattered areas of fibroglandular density.    2019  [facility]  Melina Malvern  DIAGNOSTIC LEFT ANGELICA  US BREAST LEFT  FINDINGS:  Mammogram: Persistence of a 0.7 cm, high density, bandlike asymmetry in the medial left breast.  Ultrasound: Left breast 9 o’clock, 3 cm from nipple is a 0.7 x 0.9 cm elongated duct with internal debris that correlates with the mammographic lesion.  BI-RADS CATEGORY: 4A    19  [facility]  BILA BREAST MRI   MELINA MARTIN   Left breast 9 o’clock middle third 4 cm from the nipple metallic clip seen along the medial margin of the residual area clumped enhancement 1.4 cm anterior to posterior, 0.7 cm superior to inferior and 0.6 cm medial to lateral. No other areas left breast. No evidence for left axillary or internal mammary chain adenopathy. No areas of abnormal enhancement right breast.       She had a biopsy on the following day that showed:  2019  [facility]   Radiology  Melina Martin  ULTRASOUND BREAST BX  9 o’clock anterior  medial left breast.  12 gauge Encor.  3 core biopsy specimens. A ribbon shaped clip. Positioning of the clip was confirmed.  ADDENDUM:  Tiny focus of possible microinvasive carcinoma. Result is concordant.    04/22/2019  [facility]   Pathology  Melina Martin  Low grade ductal carcinoma in situ (DCIS), see comment:  - Size of largest focus: 4 mm  - Architectural patterns: Cribriform and mucinous  - Necrosis: Not identified  - Microcalcification: Not identified  o Estrogen receptor - 100%  o Progesterone receptor - 100%  A tiny focus of possible microinvasive mucinous carcinoma is present on the initial two levels examined inly. Deeper levels additionally demonstrate rare fragments of detached carcinoma within pools of mucin. These findings may represent mucinous carcinoma.    She has not had a breast biopsy in the past.  She has her uterus and ovaries, is postmenopausal, and takes nor hormones.  Her family history includes the following: She has 2 daughters, no sons, no sisters, 2 maternal aunts, no paternal aunts.  One maternal aunt had breast cancer in her 80s.  Her mother lived to be 101 and did not have malignancy.  She is here for evaluation.    Review of Systems:  Review of Systems   Eyes: Positive for eye problems.   Respiratory: Positive for wheezing.    Endocrine: Positive for hot flashes.   All other systems reviewed and are negative.       Past Medical and Surgical History:  Breast Biopsy History:  Patient had not had a breast biopsy prior to her cancer diagnosis.  Breast Cancer HIstory:  Patient does not have a past medical history of breast cancer.  Breast Operations, and year:  NONE   Obstetric/Gynecologic History:  Age menstrual periods began: 13  Patient is postmenopausal, entered menopause naturally at age: 7/2000   Number of pregnancies:2  Number of live births: 2  Number of abortions or miscarriages: 0  Age of delivery of first child: 28  Patient breast fed, for the following lenth of time:  "APPROX 1 YR   Length of time taking birth control pills: 7-10 YRS   Patient has never taken hormone replacement  PATIENT HAS BOTH OVARIES AND UTERUS.     Past Surgical History:   Procedure Laterality Date   • BREAST BIOPSY     •  SECTION     • OVARIAN CYST REMOVAL         Past Medical History:   Diagnosis Date   • Cataract    • Heart murmur    • HTN (hypertension)    • Hyperlipidemia        Prior Hospitalizations, other than for surgery or childbirth, and year:  NONE     Social History     Socioeconomic History   • Marital status: Single     Spouse name: Not on file   • Number of children: Not on file   • Years of education: Not on file   • Highest education level: Not on file   Tobacco Use   • Smoking status: Former Smoker     Types: Cigarettes     Last attempt to quit:      Years since quittin.3   • Smokeless tobacco: Never Used   Substance and Sexual Activity   • Alcohol use: Yes     Comment: occ. 1-2 month    • Drug use: No     Patient is .  Patient is retired.  Patient drinks 1-2 servings of caffeine per day.    Family History:  Family History   Problem Relation Age of Onset   • Colon cancer Mother 89   • Hearing loss Mother    • Hypertension Mother    • Hearing loss Father    • Heart attack Father    • Diabetes Brother    • Heart attack Brother    • Depression Daughter    • Breast cancer Maternal Aunt         UNKNOWN AGE    • Cancer Cousin         FEMALE CANCER OF UNKNOWN ORGIN 60S       Vital Signs:  /92 (BP Location: Right arm, Patient Position: Sitting, Cuff Size: Adult)   Pulse 91   Ht 154.3 cm (60.75\")   Wt 86 kg (189 lb 8 oz)   SpO2 97%   Breastfeeding? No   BMI 36.10 kg/m²      Medications:    Current Outpatient Medications:   •  amLODIPine (NORVASC) 2.5 MG tablet, , Disp: , Rfl:   •  aspirin 81 MG EC tablet, Take 81 mg by mouth Daily., Disp: , Rfl:   •  Biotin w/ Vitamins C & E (HAIR/SKIN/NAILS PO), Take  by mouth., Disp: , Rfl:   •  Coenzyme Q10 (CO Q 10 PO), Take  " "by mouth., Disp: , Rfl:   •  Misc Natural Products (OSTEO BI-FLEX/5-LOXIN ADVANCED PO), Take  by mouth., Disp: , Rfl:   •  Multiple Vitamins-Minerals (MULTIVITAMIN WITH MINERALS) tablet tablet, Take 1 tablet by mouth Daily., Disp: , Rfl:   •  Omega-3 Fatty Acids (OMEGA-3 FISH OIL PO), Take  by mouth. omegaplex, Disp: , Rfl:   •  simvastatin (ZOCOR) 40 MG tablet, , Disp: , Rfl:      Allergies:  Allergies   Allergen Reactions   • Codeine Other (See Comments)     Restless, unable to sleep       Physical Examination:  /92 (BP Location: Right arm, Patient Position: Sitting, Cuff Size: Adult)   Pulse 91   Ht 154.3 cm (60.75\")   Wt 86 kg (189 lb 8 oz)   SpO2 97%   Breastfeeding? No   BMI 36.10 kg/m²   General Appearance:  Patient is in no distress.  She is well kept and has an obese build.   Psychiatric:  Patient with appropriate mood and affect. Alert and oriented to self, time, and place.    Breast, RIGHT:  medium sized, asymmetric with the contralateral side, as below.  Breast skin is without erythema, edema, rashes.  There are no visible abnormalities upon inspection during the arm-raising maneuver or with hands on hips in the sitting position. There is no nipple retraction, discharge or nipple/areolar skin changes.There are no masses palpable in the sitting or supine positions.    Breast, LEFT:  small sized, asymmetric with the contralateral side, right breast is a 38B and left breast is a 38A.  There are mild ecchymoses left medial breast from her recent biopsy.  Breast skin is without erythema, edema, rashes.  There are no visible abnormalities upon inspection during the arm-raising maneuver or with hands on hips in the sitting position. There is no nipple retraction, discharge or nipple/areolar skin changes.There are no masses palpable in the sitting or supine positions.    Lymphatic:  There is no axillary, cervical, infraclavicular, or supraclavicular adenopathy bilaterally.  Eyes:  Pupils are round " and reactive to light.  Cardiovascular:  Heart rate and rhythm are regular. There is a blowing murmur present.  Respiratory:  Lungs are clear bilaterally with no crackles or wheezes in any lung field.  Gastrointestinal:  Abdomen is soft, nondistended, and nontender.     Musculoskeletal:  Good strength in all 4 extremities.   There is good range of motion in both shoulders.    Skin:  No new skin lesions or rashes on the skin excluding the breast (see breast exam above).        [facility]  BIRADS 2 BENIGN  Scattered areas of fibroglandular density.    02/15/2018  [facility]  SCREENING BILATERAL MAMOGRAM WITH 3D TOMOSYNTHESIS  BIRADS 1 NEGATIVE  Scattered areas of fibroglandular density.    2019  [facility]  SCREENING BILATERAL MAMOGRAM WITH 3D TOMOSYNTHESIS  FINDINGS:  5 mm asymmetry lower left breast, middle depth.  BIRADS 0  Scattered areas of fibroglandular density.    2019  [facility]  DIAGNOSTIC LEFT ANGELICA  US BREAST LEFT  FINDINGS:  Mammogram: Persistence of a 0.7 cm, high density, bandlike asymmetry in the medial left breast.  Ultrasound: Left breast 9 o’clock, 3 cm from nipple is a 0.7 x 0.9 cm elongated duct with internal debris that correlates with the mammographic lesion.  BI-RADS CATEGORY: 4A    19  [facility]  BILA BREAST MRI   MELINA NIEVES   Left breast 9 o’clock middle third 4 cm from the nipple metallic clip seen along the medial margin of the residual area clumped enhancement 1.4 cm anterior to posterior, 0.7 cm superior to inferior and 0.6 cm medial to lateral. No other areas left breast. No evidence for left axillary or internal mammary chain adenopathy. No areas of abnormal enhancement right breast.     Pathology:  2019  [facility]   Radiology  [facility]  ULTRASOUND BREAST BX  9 o’clock anterior medial left breast.  12 gauge Encor.  3 core  biopsy specimens. A ribbon shaped clip. Positioning of the clip was confirmed.  ADDENDUM:  Tiny focus of possible microinvasive carcinoma. Result is concordant.    04/22/2019  [facility]   Pathology  Melina Martin  Low grade ductal carcinoma in situ (DCIS), see comment:  - Size of largest focus: 4 mm  - Architectural patterns: Cribriform and mucinous  - Necrosis: Not identified  - Microcalcification: Not identified  o Estrogen receptor - 100%  o Progesterone receptor - 100%  A tiny focus of possible microinvasive mucinous carcinoma is present on the initial two levels examined inly. Deeper levels additionally demonstrate rare fragments of detached carcinoma within pools of mucin. These findings may represent mucinous carcinoma.    Procedures:      Assessment:   Diagnosis Plan   1. Ductal carcinoma in situ (DCIS) of left breast  Ambulatory Referral to Plastic Surgery   2. FH: breast cancer     3. Obesity (BMI 35.0-39.9 without comorbidity)     4. Breast asymmetry  Ambulatory Referral to Plastic Surgery   5. Nonrheumatic aortic valve insufficiency       1-  LEFT breast 9:00, 3.5 CFN- 9mm US, 7mm mammogram, 1.4 cm MRI  Low-grade ductal carcinoma in situ, 4 mm and a core, could perform a mucinous, versus microinvasive mucinous carcinoma.  Estrogen 100%, progesterone 100%.  Ribbon marker left in good position.  Clinical stage T IS, N0 stage 0.    2-  1 of 2 MA in her 80s. Mother lived until 100 with no breast ca        3-  BMI 36    4-  RIGHT larger than LEFT by a cup- 38 B versus 38A    5-  Dr Jones in [facility] is cardiologist.    Plan:  Melina, her daughter and I reviewed her interval history, imaging, imaging reports, pathology reports and examination together today.  We reviewed the difference in systemic therapy versus locoregional. We discussed that she will be seeing a medical oncologist in the adjuvant setting. We discussed that for DCIS, that we would expect a 98% breast cancer specific survival and  that the medications offered by medical oncology are for risk reduction for additional breast cancers and for a reduction in ipsilateral locoregional recurrence.     We discussed that for unifocal DCIS, there are 2 options for locoregional treatment that have equivalent survival: total mastectomy versus lumpectomy and radiation, the former with sentinel node biopsy.     She is interested in breast conservation.     We discussed the option of oncoplastic closure with contralateral mastopexy, and she is  interested in this, in light of her baseline asymmetry, where the unaffected breast is larger.  We will arrange for her to talk with plastic surgery about an oncoplastic closure and a contralateral mastopexy for symmetry.      We discussed the following procedure:  Left breast needle localized lumpectomy, followed by bilateral oncoplastics.    She understands the risks of lumpectomy including bleeding, infection, seroma and 25% chance of positive margins.    We discussed that if she were found to have an invasive colloid tumor, that it is likely we would not return for sentinel nodes.  If there was a mixed or ductal cancer than we may.    NCCN guidelines have been followed.  We fitted her for a postoperative bra today.    She will receive a recommendation for radiation after surgery.    She is going to check and see if she would be interested in seeing Dr. Muniz postoperatively to discuss hormone blocking medication.  With regards to her being overweight we did discuss that with a heavily hormone fed tumor that we recommend weight loss as well as hormone blocking medication to reduce the risk of recurrence or second primary.    We did discuss her family history and the option for genetic testing and she is not interested in that at this time.    She is pleased with her excellent prognosis and we will get her set up for surgery.      Beatriz Fuentes MD    - addendum- note from her cardiologist Dr Koo  Robert in [place]-   Non-rheumatic aortic insufficiency, stable. Continue losartan, simvastatin.RTC 6mo    We have spent 60 minutes in visit today, 45 in face to face .      Next Appointment:  Return for surgery.      EMR Dragon/transcription disclaimer:    Much of this encounter note is an electronic transcription/translocation of spoken language to printed text.  The electronic translation of spoken language may permit erroneous, or at times, nonsensical words or phrases to be inadvertently transcribed.  Although I have reviewed the note from such areas, some may still exist.

## 2019-06-13 NOTE — ANESTHESIA POSTPROCEDURE EVALUATION
Patient: Melina Martin    Procedure Summary     Date:  06/13/19 Room / Location:   [facility]  [facility]    Anesthesia Start:  0844 Anesthesia Stop:  1001    Procedure:  Left breast needle localized lumpectomy, (Left Breast) Diagnosis:       Breast neoplasm, Tis (DCIS), left      (Breast neoplasm, Tis (DCIS), left [D05.12])    Surgeon:  Beatriz Fuentes MD Provider:  Joo Oliva MD    Anesthesia Type:  general ASA Status:  2          Anesthesia Type: general  Last vitals  BP   149/67 (06/13/19 1045)   Temp   36.9 °C (98.5 °F) (06/13/19 0958)   Pulse   77 (06/13/19 0958)   Resp   16 (06/13/19 1045)     SpO2   100 % (06/13/19 0958)     Post Anesthesia Care and Evaluation    Patient location during evaluation: bedside  Patient participation: complete - patient participated  Level of consciousness: awake  Pain score: 1  Pain management: adequate  Airway patency: patent  Anesthetic complications: No anesthetic complications    Cardiovascular status: acceptable  Respiratory status: acceptable  Hydration status: acceptable    Comments: --------------------            06/13/19               1045     --------------------   BP:       149/67     Pulse:               Resp:       16       Temp:                SpO2:               --------------------

## 2019-06-13 NOTE — ANESTHESIA PREPROCEDURE EVALUATION
Anesthesia Evaluation     Patient summary reviewed and Nursing notes reviewed   NPO Solid Status: > 8 hours             Airway   Mallampati: III  TM distance: >3 FB  Neck ROM: full  Possible difficult intubation  Dental - normal exam     Pulmonary - negative pulmonary ROS and normal exam   Cardiovascular - normal exam    (+) hypertension, valvular problems/murmurs murmur,     ROS comment: No cp/soa    Neuro/Psych- negative ROS  GI/Hepatic/Renal/Endo - negative ROS     Musculoskeletal (-) negative ROS    Abdominal  - normal exam   Substance History - negative use     OB/GYN negative ob/gyn ROS         Other                        Anesthesia Plan    ASA 2     general     intravenous induction   Anesthetic plan, all risks, benefits, and alternatives have been provided, discussed and informed consent has been obtained with: patient.    Plan discussed with CRNA.

## 2019-06-17 ENCOUNTER — TELEPHONE (OUTPATIENT)
Dept: SURGERY | Facility: CLINIC | Age: 69
End: 2019-06-17

## 2019-06-17 DIAGNOSIS — D05.12 DUCTAL CARCINOMA IN SITU (DCIS) OF LEFT BREAST: Primary | ICD-10-CM

## 2019-06-17 LAB
CYTO UR: NORMAL
LAB AP CASE REPORT: NORMAL
LAB AP CLINICAL INFORMATION: NORMAL
LAB AP DIAGNOSIS COMMENT: NORMAL
LAB AP SYNOPTIC CHECKLIST: NORMAL
PATH REPORT.FINAL DX SPEC: NORMAL
PATH REPORT.GROSS SPEC: NORMAL

## 2019-06-17 NOTE — TELEPHONE ENCOUNTER
Pathology report from June 13, 2019 left breast needle localized lumpectomy.  Focal low-grade duct carcinoma in situ, 6 mm, cribriform, low-grade without necrosis.  No invasive carcinoma, but stains are being performed to rule out invasion in the pools of mucin.  All margins are clear.  The closest margin on the lumpectomy was 1.5 mm from the superior margin.  Additional superior margin negative for malignancy.  Pathologic stage Tis N0 stage 0.  I called and let her know.  We will arrange for her to see both Dr. Muniz at [facility] and Dr. Sanderson from radiation at [facility].    Final Diagnosis   1. Left Breast Lumpectomy:               A. Focal low grade ductal carcinoma in situ (DCIS).                            1. Area of DCIS spans 6.0 x 5.0 x 2.0 mm.                            2. Predominantly cribriform type with low nuclear grade features without necrosis.               B. No invasive carcinoma identified by routine or IHC staining (see comment).               C. Associated pool of extravasated mucin associated with DCIS.               D. All margins are negative for DCIS.                    DCIS measures 1.5 mm from the closest (superior) margin of excision.                    All other margins measure at least 3 mm from DCIS including:                            Lateral margin: 3 mm                            Medial margin: 23 mm                            Anterior margin: 20 mm                            Posterior margin: 30 mm                             Inferior margin: 6 mm                                           E. Stromal scar, foreign body giant cell reaction and changes consistent with previous biopsy site noted.               F. No lymphovascular space invasion by malignancy identified.               G. DCIS previously reported Estrogen receptor positive and Progesterone receptor per outside report                    ([facility]).                              Pathologic  stage: pTis (DCIS), NX (G1).               See synoptic for tumor details                 2. New Lateral Margin:               A. No in situ nor infiltrating carcinoma.               B. New lateral margin is negative for malignancy by additional 8 mm.     3. New Anterior Margin:               A. No in situ nor infiltrating carcinoma.               B. New anterior margin is negative for malignancy by additional 5 mm.     4. New Posterior Margin:               A. No in situ nor infiltrating carcinoma.               B. New posterior margin is negative for malignancy by additional 10 mm.     5. New Medial Margin:               A. No in situ nor infiltrating carcinoma.               B. New medial margin is negative for malignancy by additional 6 mm.     6. New Inferior Margin:               A. No in situ nor infiltrating carcinoma.               B. New inferior margin is negative for malignancy by additional 8 mm.     7. New Superior Margin:               A. No in situ nor infiltrating carcinoma.               B. New superior margin is negative for malignancy by additional 8 mm.     jules/martin    Preliminary result electronically signed by Ladarius Franco MD on 6/14/2019 at 1554   Synoptic Checklist   DCIS OF THE BREAST   Breast DCIS - All Specimens   CLINICAL   Radiologic Finding  Mass or architectural distortion    SPECIMEN   Procedure  Excision (less than total mastectomy)    Specimen Laterality  Left    TUMOR   Tumor Site     Clock Position of Tumor Site  9 o'clock    Histologic Type  Ductal carcinoma in situ    Size (Extent) of DCIS  Estimated size of DCIS greatest dimension in Millimeters (mm) is at least: 6 Millimeters (mm)   Additional Dimension in Millimeters (mm)  5 Millimeters (mm)     2 Millimeters (mm)   Architectural Patterns  Cribriform    Nuclear Grade  Grade I (low)    Accessory Findings     Necrosis  Not identified    Microcalcifications  Not identified    MARGINS   Margins  Uninvolved by DCIS     Distance of DCIS from Anterior Margin in Millimeters (mm)  25 Millimeters (mm)   Distance of DCIS from Posterior Margin in Millimeters (mm)  40 Millimeters (mm)   Distance of DCIS from Superior Margin in Millimeters (mm)  9.5 Millimeters (mm)   Distance of DCIS from Inferior Margin in Millimeters (mm)  14 Millimeters (mm)   Distance of DCIS from Medial Margin in Millimeters (mm)  29 Millimeters (mm)   Distance of DCIS from Lateral Margin in Millimeters (mm)  11 Millimeters (mm)   Distance from Closest Margin in Millimeters (mm)  9.5 Millimeters (mm)   Closest Margin  Superior    LYMPH NODES   Regional Lymph Nodes  No lymph nodes submitted or found    PATHOLOGIC STAGE CLASSIFICATION (pTNM, AJCC 8th Edition)   TNM Descriptors  Not applicable    Primary Tumor (pT)  pTis (DCIS)    Regional Lymph Nodes (pN)     Category (pN)  pNX    .      Comment P   IHC stains for P63 and pancytokeratin will be performed in house utilizing appropriate controls to evaluate focal pool of mucin for evidence of carcinoma and reported separately.

## 2019-06-27 ENCOUNTER — TELEPHONE (OUTPATIENT)
Dept: SURGERY | Facility: CLINIC | Age: 69
End: 2019-06-27

## 2019-06-27 NOTE — TELEPHONE ENCOUNTER
Scheduled Consult with Dr Sanderson  Spoke with María, had to fax records  She will call back with appt.      Scheduled Consult with Dr Muniz  Had to leave message-I have left 2 messages.    kdl    Consult with Dr Frye 7-17-19 @ 1:30 (Medical Oncologist)  Consult with Dr Bakari Sanderson 7-2-19 @ 10:00 (Radiation Oncologist)  kdl    Lm for pt to call

## 2019-07-02 ENCOUNTER — HOSPITAL ENCOUNTER (OUTPATIENT)
Dept: RADIATION ONCOLOGY | Facility: HOSPITAL | Age: 69
Setting detail: RECURRING SERIES
Discharge: HOME | End: 2019-07-31
Attending: RADIOLOGY

## 2019-07-03 ENCOUNTER — OFFICE VISIT (OUTPATIENT)
Dept: SURGERY | Facility: CLINIC | Age: 69
End: 2019-07-03

## 2019-07-03 VITALS
HEIGHT: 64 IN | DIASTOLIC BLOOD PRESSURE: 78 MMHG | SYSTOLIC BLOOD PRESSURE: 144 MMHG | WEIGHT: 195 LBS | HEART RATE: 81 BPM | OXYGEN SATURATION: 96 % | BODY MASS INDEX: 33.29 KG/M2

## 2019-07-03 DIAGNOSIS — Z12.31 ENCOUNTER FOR SCREENING MAMMOGRAM FOR MALIGNANT NEOPLASM OF BREAST: ICD-10-CM

## 2019-07-03 DIAGNOSIS — N64.89 BREAST ASYMMETRY: ICD-10-CM

## 2019-07-03 DIAGNOSIS — E66.9 OBESITY (BMI 35.0-39.9 WITHOUT COMORBIDITY): ICD-10-CM

## 2019-07-03 DIAGNOSIS — D05.12 DUCTAL CARCINOMA IN SITU (DCIS) OF LEFT BREAST: Primary | ICD-10-CM

## 2019-07-03 DIAGNOSIS — Z80.3 FH: BREAST CANCER: ICD-10-CM

## 2019-07-03 DIAGNOSIS — I35.1 NONRHEUMATIC AORTIC VALVE INSUFFICIENCY: ICD-10-CM

## 2019-07-03 PROCEDURE — 99024 POSTOP FOLLOW-UP VISIT: CPT | Performed by: SURGERY

## 2019-07-03 RX ORDER — CARVEDILOL 6.25 MG/1
6.25 TABLET ORAL 2 TIMES DAILY WITH MEALS
COMMUNITY
Start: 2019-06-12

## 2019-07-03 NOTE — PROGRESS NOTES
Chief Complaint: Melina Martin is a 68 y.o. female who was seen in consultation at the request of Mere Pena DO  for newly diagnosed breast cancer and a postoperative visit    History of Present Illness:  Patient presents with newly diagnosed DCIS, LEFT breast.  She noted no new masses, skin changes, nipple discharge, nipple changes prior to her most recent imaging.    Her most recent imaging includes the followin/15/2018  [facility]  [place]  SCREENING BILATERAL MAMOGRAM WITH 3D TOMOSYNTHESIS  BIRADS 1 NEGATIVE  Scattered areas of fibroglandular density.    2019  [facility]  [facility]  SCREENING BILATERAL MAMOGRAM WITH 3D TOMOSYNTHESIS  FINDINGS:  5 mm asymmetry lower left breast, middle depth.  BIRADS 0  Scattered areas of fibroglandular density.    2019  [facility]  Melina [place]  DIAGNOSTIC LEFT ANGELICA  US BREAST LEFT  FINDINGS:  Mammogram: Persistence of a 0.7 cm, high density, bandlike asymmetry in the medial left breast.  Ultrasound: Left breast 9 o’clock, 3 cm from nipple is a 0.7 x 0.9 cm elongated duct with internal debris that correlates with the mammographic lesion.  BI-RADS CATEGORY: 4A    19  [facility]  BILA BREAST MRI   MELINA MARTIN   Left breast 9 o’clock middle third 4 cm from the nipple metallic clip seen along the medial margin of the residual area clumped enhancement 1.4 cm anterior to posterior, 0.7 cm superior to inferior and 0.6 cm medial to lateral. No other areas left breast. No evidence for left axillary or internal mammary chain adenopathy. No areas of abnormal enhancement right breast.       She had a biopsy on the following day that showed:  2019  [facility]   Radiology  Melina Martin  ULTRASOUND BREAST BX  9 o’clock anterior medial left breast.  12 gauge Encor.  3 core biopsy specimens. A ribbon shaped clip. Positioning of the clip was confirmed.  ADDENDUM:  Tiny focus of possible microinvasive  carcinoma. Result is concordant.    04/22/2019  [facility]   Pathology  Melina Martin  Low grade ductal carcinoma in situ (DCIS), see comment:  - Size of largest focus: 4 mm  - Architectural patterns: Cribriform and mucinous  - Necrosis: Not identified  - Microcalcification: Not identified  o Estrogen receptor - 100%  o Progesterone receptor - 100%  A tiny focus of possible microinvasive mucinous carcinoma is present on the initial two levels examined inly. Deeper levels additionally demonstrate rare fragments of detached carcinoma within pools of mucin. These findings may represent mucinous carcinoma.    She has not had a breast biopsy in the past.  She has her uterus and ovaries, is postmenopausal, and takes nor hormones.  Her family history includes the following: She has 2 daughters, no sons, no sisters, 2 maternal aunts, no paternal aunts.  One maternal aunt had breast cancer in her 80s.  Her mother lived to be 101 and did not have malignancy.    Interval History:  Pathology report from June 13, 2019 left breast needle localized lumpectomy.  Focal low-grade duct carcinoma in situ, 6 mm, cribriform, low-grade without necrosis.  No invasive carcinoma, but stains are being performed to rule out invasion in the pools of mucin.  All margins are clear.  The closest margin on the lumpectomy was 1.5 mm from the superior margin.  Additional superior margin negative for malignancy.  Pathologic stage Tis N0 stage 0.     Denies significant discomfort, redness, warmth, drainage from incision.      Review of Systems:  Review of Systems   Eyes: Positive for eye problems.   Respiratory: Positive for wheezing.    Endocrine: Positive for hot flashes.   All other systems reviewed and are negative.       Past Medical and Surgical History:  Breast Biopsy History:  Patient had not had a breast biopsy prior to her cancer diagnosis.  Breast Cancer HIstory:  Patient does not have a past medical history of breast cancer.  Breast  Operations, and year:  NONE   Obstetric/Gynecologic History:  Age menstrual periods began: 13  Patient is postmenopausal, entered menopause naturally at age: [age]   Number of pregnancies:2  Number of live births: 2  Number of abortions or miscarriages: 0  Age of delivery of first child: 28  Patient breast fed, for the following lenth of time: APPROX 1 YR   Length of time taking birth control pills: 7-10 YRS   Patient has never taken hormone replacement  PATIENT HAS BOTH OVARIES AND UTERUS.     Past Surgical History:   Procedure Laterality Date   • BREAST BIOPSY Left     MALIGNANT(LOW GRADE DUCTAL CARCINOMA)   • BREAST LUMPECTOMY Left 2019    Procedure: Left breast needle localized lumpectomy,;  Surgeon: Beatriz Fuentes MD;  [place] [facility] [place] [facility]  Service: General   •  SECTION      X 1   • COLONOSCOPY     • HAND SURGERY Right    • OVARIAN CYST REMOVAL         Past Medical History:   Diagnosis Date   • Anxiety    • Cancer (CMS/HCC)     LEFT   • Cataract    • GERD (gastroesophageal reflux disease)    • Heart murmur    • HTN (hypertension)    • Hyperlipidemia    • Sinus infection 2019    PT PRESCRIBED ANTIBIOTIC FINISHED 2019        Prior Hospitalizations, other than for surgery or childbirth, and year:  NONE     Social History     Socioeconomic History   • Marital status: Single     Spouse name: Not on file   • Number of children: Not on file   • Years of education: Not on file   • Highest education level: Not on file   Tobacco Use   • Smoking status: Former Smoker     Packs/day: 0.25     Years: 16.00     Pack years: 4.00     Types: Cigarettes     Last attempt to quit:      Years since quittin.5   • Smokeless tobacco: Never Used   Substance and Sexual Activity   • Alcohol use: Yes     Comment: 1-2 DRINKS MONTHLY    • Drug use: No   • Sexual activity: Defer     Patient is .  Patient is retired.  Patient drinks 1-2 servings of caffeine per day.    Family  "History:  Family History   Problem Relation Age of Onset   • Colon cancer Mother 89   • Hearing loss Mother    • Hypertension Mother    • Hearing loss Father    • Heart attack Father    • Diabetes Brother    • Heart attack Brother    • Depression Daughter    • Breast cancer Maternal Aunt         UNKNOWN AGE    • Cancer Cousin         FEMALE CANCER OF UNKNOWN ORGIN 60S   • Malig Hyperthermia Neg Hx        Vital Signs:  /78 (BP Location: Left arm, Patient Position: Sitting, Cuff Size: Adult)   Pulse 81   Ht 162.6 cm (64\")   Wt 88.5 kg (195 lb)   LMP  (LMP Unknown)   SpO2 96%   Breastfeeding? No   BMI 33.47 kg/m²      Medications:    Current Outpatient Medications:   •  carvedilol (COREG) 6.25 MG tablet, , Disp: , Rfl:   •  raNITIdine (ZANTAC) 75 MG tablet, Take 150 mg by mouth Every Morning., Disp: , Rfl:   •  simvastatin (ZOCOR) 40 MG tablet, Take 40 mg by mouth Every Night., Disp: , Rfl:   •  HYDROcodone-acetaminophen (NORCO) 5-325 MG per tablet, Take 1-2 tablets by mouth Every 4 to 6 Hours As Needed for Pain., Disp: 30 tablet, Rfl: 0  •  ondansetron (ZOFRAN) 4 MG tablet, Take 1-2 tablets by mouth Every 8 (Eight) Hours As Needed for Nausea., Disp: 10 tablet, Rfl: 0     Allergies:  No Known Allergies    Physical Examination:  /78 (BP Location: Left arm, Patient Position: Sitting, Cuff Size: Adult)   Pulse 81   Ht 162.6 cm (64\")   Wt 88.5 kg (195 lb)   LMP  (LMP Unknown)   SpO2 96%   Breastfeeding? No   BMI 33.47 kg/m²   General Appearance:  Patient is in no distress.  She is well kept and has an obese build.   Psychiatric:  Patient with appropriate mood and affect. Alert and oriented to self, time, and place.    Breast, RIGHT:  medium sized, asymmetric with the contralateral side, as below.  Breast skin is without erythema, edema, rashes.  There are no visible abnormalities upon inspection during the arm-raising maneuver or with hands on hips in the sitting position. There is no nipple " retraction, discharge or nipple/areolar skin changes.There are no masses palpable in the sitting or supine positions.    Breast, LEFT:  small sized, asymmetric with the contralateral side, right breast is a 38B and left breast is a 38A.  There are mild ecchymoses left medial breast from her recent biopsy.  Breast skin is without erythema, edema, rashes.  There are no visible abnormalities upon inspection during the arm-raising maneuver or with hands on hips in the sitting position. There is no nipple retraction, discharge or nipple/areolar skin changes.There are no masses palpable in the sitting or supine positions.  Well-healed radial incision with no erythema warmth or drainage from her 2019 lumpectomy for DCIS.    Lymphatic:  There is no axillary, cervical, infraclavicular, or supraclavicular adenopathy bilaterally.  Eyes:  Pupils are round and reactive to light.  Cardiovascular:  Heart rate and rhythm are regular. There is a blowing murmur present.  Respiratory:  Lungs are clear bilaterally with no crackles or wheezes in any lung field.  Gastrointestinal:  Abdomen is soft, nondistended, and nontender.     Musculoskeletal:  Good strength in all 4 extremities.   There is good range of motion in both shoulders.    Skin:  No new skin lesions or rashes on the skin excluding the breast (see breast exam above).        Imagin2017  [facility]  Melina Martin  BIRADS 2 BENIGN  Scattered areas of fibroglandular density.    02/15/2018  [facility]  [place]  SCREENING BILATERAL MAMOGRAM WITH 3D TOMOSYNTHESIS  BIRADS 1 NEGATIVE  Scattered areas of fibroglandular density.    2019  [facility]  [place]  SCREENING BILATERAL MAMOGRAM WITH 3D TOMOSYNTHESIS  FINDINGS:  5 mm asymmetry lower left breast, middle depth.  BIRADS 0  Scattered areas of fibroglandular density.    2019  [facility]  Melina Martin  DIAGNOSTIC LEFT ANGELICA  US BREAST  LEFT  FINDINGS:  Mammogram: Persistence of a 0.7 cm, high density, bandlike asymmetry in the medial left breast.  Ultrasound: Left breast 9 o’clock, 3 cm from nipple is a 0.7 x 0.9 cm elongated duct with internal debris that correlates with the mammographic lesion.  BI-RADS CATEGORY: 4A    04/30/19  [facility]  BILA BREAST MRI   MELINA MARTIN   Left breast 9 o’clock middle third 4 cm from the nipple metallic clip seen along the medial margin of the residual area clumped enhancement 1.4 cm anterior to posterior, 0.7 cm superior to inferior and 0.6 cm medial to lateral. No other areas left breast. No evidence for left axillary or internal mammary chain adenopathy. No areas of abnormal enhancement right breast.     Pathology:  04/22/2019  [facility]   Radiology  Melina Martin  ULTRASOUND BREAST BX  9 o’clock anterior medial left breast.  12 gauge Encor.  3 core biopsy specimens. A ribbon shaped clip. Positioning of the clip was confirmed.  ADDENDUM:  Tiny focus of possible microinvasive carcinoma. Result is concordant.    04/22/2019  [facility]   Pathology  Melina Martin  Low grade ductal carcinoma in situ (DCIS), see comment:  - Size of largest focus: 4 mm  - Architectural patterns: Cribriform and mucinous  - Necrosis: Not identified  - Microcalcification: Not identified  o Estrogen receptor - 100%  o Progesterone receptor - 100%  A tiny focus of possible microinvasive mucinous carcinoma is present on the initial two levels examined inly. Deeper levels additionally demonstrate rare fragments of detached carcinoma within pools of mucin. These findings may represent mucinous carcinoma.    Pathology report from June 13, 2019 left breast needle localized lumpectomy.  Focal low-grade duct carcinoma in situ, 6 mm, cribriform, low-grade without necrosis.  No invasive carcinoma, but stains are being performed to rule out invasion in the pools of mucin.  All margins are clear.  The closest margin on the lumpectomy was 1.5  mm from the superior margin.  Additional superior margin negative for malignancy.  Pathologic stage Tis N0 stage 0.  I called and let her know.  We will arrange for her to see both Dr. Muniz at [facility] and Dr. Sanderson from radiation at [facility].         Final Diagnosis   1. Left Breast Lumpectomy:               A. Focal low grade ductal carcinoma in situ (DCIS).                            1. Area of DCIS spans 6.0 x 5.0 x 2.0 mm.                            2. Predominantly cribriform type with low nuclear grade features without necrosis.               B. No invasive carcinoma identified by routine or IHC staining (see comment).               C. Associated pool of extravasated mucin associated with DCIS.               D. All margins are negative for DCIS.                    DCIS measures 1.5 mm from the closest (superior) margin of excision.                    All other margins measure at least 3 mm from DCIS including:                            Lateral margin: 3 mm                            Medial margin: 23 mm                            Anterior margin: 20 mm                            Posterior margin: 30 mm                             Inferior margin: 6 mm                  E. Stromal scar, foreign body giant cell reaction and changes consistent with previous biopsy site noted.               F. No lymphovascular space invasion by malignancy identified.               G. DCIS previously reported Estrogen receptor positive and Progesterone receptor per outside report                    ([facility]).                  Pathologic stage: pTis (DCIS), NX (G1).               See synoptic for tumor details     2. New Lateral Margin:               A. No in situ nor infiltrating carcinoma.               B. New lateral margin is negative for malignancy by additional 8 mm.     3. New Anterior Margin:               A. No in situ nor infiltrating carcinoma.               B. New anterior margin is  negative for malignancy by additional 5 mm.     4. New Posterior Margin:               A. No in situ nor infiltrating carcinoma.               B. New posterior margin is negative for malignancy by additional 10 mm.     5. New Medial Margin:               A. No in situ nor infiltrating carcinoma.               B. New medial margin is negative for malignancy by additional 6 mm.     6. New Inferior Margin:               A. No in situ nor infiltrating carcinoma.               B. New inferior margin is negative for malignancy by additional 8 mm.     7. New Superior Margin:               A. No in situ nor infiltrating carcinoma.               B. New superior margin is negative for malignancy by additional 8 mm.     jaamada/martin    Preliminary result electronically signed by Ladarius Franco MD on 6/14/2019 at 1554   Synoptic Checklist   DCIS OF THE BREAST   Breast DCIS - All Specimens            CLINICAL   Radiologic Finding   Mass or architectural distortion    SPECIMEN   Procedure   Excision (less than total mastectomy)    Specimen Laterality   Left    TUMOR   Tumor Site       Clock Position of Tumor Site   9 o'clock    Histologic Type   Ductal carcinoma in situ    Size (Extent) of DCIS   Estimated size of DCIS greatest dimension in Millimeters (mm) is at least: 6 Millimeters (mm)   Additional Dimension in Millimeters (mm)   5 Millimeters (mm)       2 Millimeters (mm)   Architectural Patterns   Cribriform    Nuclear Grade   Grade I (low)    Accessory Findings       Necrosis   Not identified    Microcalcifications   Not identified    MARGINS   Margins   Uninvolved by DCIS    Distance of DCIS from Anterior Margin in Millimeters (mm)   25 Millimeters (mm)   Distance of DCIS from Posterior Margin in Millimeters (mm)   40 Millimeters (mm)   Distance of DCIS from Superior Margin in Millimeters (mm)   9.5 Millimeters (mm)   Distance of DCIS from Inferior Margin in Millimeters (mm)   14 Millimeters (mm)   Distance of DCIS from  Medial Margin in Millimeters (mm)   29 Millimeters (mm)   Distance of DCIS from Lateral Margin in Millimeters (mm)   11 Millimeters (mm)   Distance from Closest Margin in Millimeters (mm)   9.5 Millimeters (mm)   Closest Margin   Superior    LYMPH NODES   Regional Lymph Nodes   No lymph nodes submitted or found    PATHOLOGIC STAGE CLASSIFICATION (pTNM, AJCC 8th Edition)   TNM Descriptors   Not applicable    Primary Tumor (pT)   pTis (DCIS)    Regional Lymph Nodes (pN)       Category (pN)   pNX    .      Comment P   IHC stains for P63 and pancytokeratin will be performed in house utilizing appropriate controls to evaluate focal pool of mucin for evidence of carcinoma and reported separately.                 Preoperative EKG showed the following:  - ABNORMAL ECG -  Sinus rhythm  RBBB and LAFB  Left ventricular hypertrophy        We will contact her cardiologist Dr Rahman and fax over the EKG so he can have a look.          Procedures:      Assessment:   Diagnosis Plan   1. Ductal carcinoma in situ (DCIS) of left breast  Mammo Screening Digital Tomosynthesis Bilateral With CAD   2. FH: breast cancer     3. Obesity (BMI 35.0-39.9 without comorbidity)     4. Breast asymmetry     5. Nonrheumatic aortic valve insufficiency     6. Encounter for screening mammogram for malignant neoplasm of breast   Mammo Screening Digital Tomosynthesis Bilateral With CAD     1-  LEFT breast 9:00, 3.5 CFN- 9mm US, 7mm mammogram, 1.4 cm MRI  Low-grade ductal carcinoma in situ, 4 mm and a core, could perform a mucinous, versus microinvasive mucinous carcinoma.  Estrogen 100%, progesterone 100%.  Ribbon marker left in good position.  Clinical stage T IS, N0 stage 0.    Pathology report from June 13, 2019 left breast needle localized lumpectomy.  Focal low-grade duct carcinoma in situ, 6 mm, cribriform, low-grade without necrosis.  No invasive carcinoma, but stains are being performed to rule out invasion in the pools of mucin.  All margins  are clear.  The closest margin on the lumpectomy was 1.5 mm from the superior margin.  Additional superior margin negative for malignancy.  Pathologic stage Tis N0 stage 0.     2-  1 of 2 MA in her 80s. Mother lived until 100 with no breast ca        3-  BMI 36    4-  RIGHT larger than LEFT by a cup- 38 B versus 38A    5-  Dr Jones in [facility] is cardiologist.    Plan:  Melina, her daughter and I reviewed her examination and pathology report together today.  She is healing nicely.  We were pleased with her pathology report.  She has seen Dr. Sanderson for an initial consultation about radiation.  She has an appointment with Dr. Muniz on Wednesday.  Her next routine screening mammogram will be due March 27, 2020 at [facility].  I will arrange that and see her back after.  I did encourage her to wear supportive bra and continue meticulous skin care as she moves forward.        I asked her to continue her self breast exam and to call us in the interim with any concerns would be happy to see her back.            Beatriz Fuentes MD      Next Appointment:  Return for Next scheduled follow up, after imaging.      EMR Dragon/transcription disclaimer:    Much of this encounter note is an electronic transcription/translocation of spoken language to printed text.  The electronic translation of spoken language may permit erroneous, or at times, nonsensical words or phrases to be inadvertently transcribed.  Although I have reviewed the note from such areas, some may still exist.

## 2019-07-17 ENCOUNTER — HOSPITAL ENCOUNTER (OUTPATIENT)
Dept: ONCOLOGY | Facility: HOSPITAL | Age: 69
Discharge: HOME | End: 2019-07-17
Attending: INTERNAL MEDICINE

## 2019-07-17 ENCOUNTER — OFFICE VISIT CONVERTED (OUTPATIENT)
Dept: ONCOLOGY | Facility: HOSPITAL | Age: 69
End: 2019-07-17
Attending: INTERNAL MEDICINE

## 2019-07-22 ENCOUNTER — TELEPHONE (OUTPATIENT)
Dept: SURGERY | Facility: CLINIC | Age: 69
End: 2019-07-22

## 2019-07-22 NOTE — TELEPHONE ENCOUNTER
Note from Dr. Muniz dated July 17, 2019.  Diagnosis duct carcinoma in situ left.  Information given for [facility].  Follow-up with radiation therapy for adjuvant treatment.  She does express a strong disinterest and adjuvant therapy secondary to concerns regarding side effects and the limited benefit.  She reports that she will consider this option in the interim.  She will first proceed with adjuvant radiation therapy.

## 2019-08-02 ENCOUNTER — HOSPITAL ENCOUNTER (OUTPATIENT)
Dept: RADIATION ONCOLOGY | Facility: HOSPITAL | Age: 69
Setting detail: RECURRING SERIES
Discharge: HOME | End: 2019-08-31
Attending: RADIOLOGY

## 2019-09-16 ENCOUNTER — OFFICE VISIT CONVERTED (OUTPATIENT)
Dept: ONCOLOGY | Facility: HOSPITAL | Age: 69
End: 2019-09-16
Attending: INTERNAL MEDICINE

## 2019-09-16 ENCOUNTER — HOSPITAL ENCOUNTER (OUTPATIENT)
Dept: ONCOLOGY | Facility: HOSPITAL | Age: 69
Discharge: HOME | End: 2019-09-16
Attending: INTERNAL MEDICINE

## 2019-09-17 ENCOUNTER — TELEPHONE (OUTPATIENT)
Dept: SURGERY | Facility: CLINIC | Age: 69
End: 2019-09-17

## 2019-09-17 NOTE — TELEPHONE ENCOUNTER
Note from Dr. Ila Sharma from [facility] dated September 16, 2019: After long discussion she is agreeable to try anastrozole for a few weeks.  Return to the clinic in 6 weeks.  We will also obtain a DEXA scan.  She continues to take calcium and vitamin D.

## 2019-09-19 ENCOUNTER — HOSPITAL ENCOUNTER (OUTPATIENT)
Dept: OTHER | Facility: HOSPITAL | Age: 69
Discharge: HOME | End: 2019-09-19
Attending: INTERNAL MEDICINE

## 2019-09-24 ENCOUNTER — HOSPITAL ENCOUNTER (OUTPATIENT)
Dept: RADIATION ONCOLOGY | Facility: HOSPITAL | Age: 69
Setting detail: RECURRING SERIES
Discharge: HOME | End: 2019-09-30
Attending: RADIOLOGY

## 2019-10-29 ENCOUNTER — OFFICE VISIT CONVERTED (OUTPATIENT)
Dept: ONCOLOGY | Facility: HOSPITAL | Age: 69
End: 2019-10-29
Attending: INTERNAL MEDICINE

## 2019-10-29 ENCOUNTER — HOSPITAL ENCOUNTER (OUTPATIENT)
Dept: ONCOLOGY | Facility: HOSPITAL | Age: 69
Discharge: HOME | End: 2019-10-29
Attending: INTERNAL MEDICINE

## 2019-11-04 ENCOUNTER — TELEPHONE (OUTPATIENT)
Dept: SURGERY | Facility: CLINIC | Age: 69
End: 2019-11-04

## 2019-11-04 NOTE — TELEPHONE ENCOUNTER
Note from [facility] October 29, 2019.  Ila Sharma MD.  Patient started Arimidex September 16, 2019.  Initially had difficulty with sleep but this is improved.  Hot flashes but they are manageable.  Continue the medication for now.  Follow-up in 3 months.

## 2019-11-26 ENCOUNTER — TELEPHONE (OUTPATIENT)
Dept: SURGERY | Facility: CLINIC | Age: 69
End: 2019-11-26

## 2019-11-26 NOTE — TELEPHONE ENCOUNTER
Scheduled Bilateral 3D Screen Mammo at [facility] 3-27-20 @ 12:00    Return to see Dr UMER Colindres4-2-20 arrive 2:15    Lm on pt's phone  kdl

## 2019-12-27 ENCOUNTER — HOSPITAL ENCOUNTER (OUTPATIENT)
Dept: GASTROENTEROLOGY | Facility: HOSPITAL | Age: 69
Setting detail: HOSPITAL OUTPATIENT SURGERY
Discharge: HOME | End: 2019-12-27
Attending: INTERNAL MEDICINE

## 2020-02-06 ENCOUNTER — HOSPITAL ENCOUNTER (OUTPATIENT)
Dept: SURGERY | Facility: HOSPITAL | Age: 70
Setting detail: HOSPITAL OUTPATIENT SURGERY
Discharge: HOME | End: 2020-02-06
Attending: OPHTHALMOLOGY

## 2020-02-19 ENCOUNTER — HOSPITAL ENCOUNTER (OUTPATIENT)
Dept: CARDIOLOGY | Facility: HOSPITAL | Age: 70
Discharge: HOME | End: 2020-02-19
Attending: NURSE PRACTITIONER

## 2020-02-20 ENCOUNTER — HOSPITAL ENCOUNTER (OUTPATIENT)
Dept: SURGERY | Facility: HOSPITAL | Age: 70
Setting detail: HOSPITAL OUTPATIENT SURGERY
Discharge: HOME | End: 2020-02-20
Attending: OPHTHALMOLOGY

## 2020-02-28 ENCOUNTER — OFFICE VISIT CONVERTED (OUTPATIENT)
Dept: ONCOLOGY | Facility: HOSPITAL | Age: 70
End: 2020-02-28
Attending: INTERNAL MEDICINE

## 2020-02-28 ENCOUNTER — HOSPITAL ENCOUNTER (OUTPATIENT)
Dept: ONCOLOGY | Facility: HOSPITAL | Age: 70
Discharge: HOME | End: 2020-02-28
Attending: INTERNAL MEDICINE

## 2020-03-09 ENCOUNTER — TELEPHONE (OUTPATIENT)
Dept: SURGERY | Facility: CLINIC | Age: 70
End: 2020-03-09

## 2020-03-09 NOTE — TELEPHONE ENCOUNTER
Note from Dr. Ila Sharma medical oncology [facility] March 5, 2020: Plan duct carcinoma in situ patient is tolerating Arimidex well.

## 2020-03-18 ENCOUNTER — TELEPHONE (OUTPATIENT)
Dept: SURGERY | Facility: CLINIC | Age: 70
End: 2020-03-18

## 2020-03-18 NOTE — TELEPHONE ENCOUNTER
Patient called and said [facility] canceled her  Mammo on 3-27-20 (patient said they will call her back to reschedule, she will let us know.     Mara

## 2020-09-09 ENCOUNTER — TRANSCRIBE ORDERS (OUTPATIENT)
Dept: SURGERY | Facility: CLINIC | Age: 70
End: 2020-09-09

## 2020-09-09 DIAGNOSIS — Z12.31 ENCOUNTER FOR SCREENING MAMMOGRAM FOR HIGH-RISK PATIENT: Primary | ICD-10-CM

## 2020-09-09 DIAGNOSIS — D05.12 DUCTAL CARCINOMA IN SITU (DCIS) OF LEFT BREAST: ICD-10-CM

## 2020-09-10 ENCOUNTER — HOSPITAL ENCOUNTER (OUTPATIENT)
Dept: MAMMOGRAPHY | Facility: HOSPITAL | Age: 70
Discharge: HOME | End: 2020-09-10
Attending: SURGERY

## 2020-09-14 ENCOUNTER — TELEPHONE (OUTPATIENT)
Dept: SURGERY | Facility: CLINIC | Age: 70
End: 2020-09-14

## 2020-09-14 DIAGNOSIS — R92.8 ABNORMAL MAMMOGRAM: Primary | ICD-10-CM

## 2020-09-14 NOTE — TELEPHONE ENCOUNTER
[facility] September 10, 2020 bilateral screening mammogram with 3D.  Postop changes 9:00 left breast there is a 4.5 cm postoperative seroma.  BI-RADS 0 recommend left breast ultrasound.  We will arrange for a left ultrasound and see her back after.

## 2020-09-17 ENCOUNTER — TELEPHONE (OUTPATIENT)
Dept: SURGERY | Facility: CLINIC | Age: 70
End: 2020-09-17

## 2020-09-17 NOTE — TELEPHONE ENCOUNTER
Patient wants to know why she needs U/S. [facility] Radiologist recommended.

## 2020-09-17 NOTE — TELEPHONE ENCOUNTER
Scheduled Lt Breast US  [facility] 9-23-20 @ 10:00    Return to see DR OWUSU 11-4-20 arrive 2:45    Pt v/u  Mara

## 2020-09-23 ENCOUNTER — HOSPITAL ENCOUNTER (OUTPATIENT)
Dept: ULTRASOUND IMAGING | Facility: HOSPITAL | Age: 70
Discharge: HOME | End: 2020-09-23
Attending: SURGERY

## 2020-09-23 ENCOUNTER — TELEPHONE (OUTPATIENT)
Dept: SURGERY | Facility: CLINIC | Age: 70
End: 2020-09-23

## 2020-09-23 NOTE — TELEPHONE ENCOUNTER
[facility] September 23, 2020 bilateral screening mammogram with tomosynthesis and left breast ultrasound.  Left breast ultrasound 9:00, 4 cm from the nipple at the lumpectomy bed is a 1.9 cm mass compatible with a postoperative seroma.  There is an area of 1.2 cm heterogenous mixed hypoechoic component avascular and favored to be fat necrosis.  Recommend short-term follow-up ultrasound in 4 months.  BI-RADS 3

## 2020-10-30 ENCOUNTER — HOSPITAL ENCOUNTER (OUTPATIENT)
Dept: ONCOLOGY | Facility: HOSPITAL | Age: 70
Discharge: HOME | End: 2020-10-30
Attending: INTERNAL MEDICINE

## 2020-10-30 ENCOUNTER — OFFICE VISIT CONVERTED (OUTPATIENT)
Dept: ONCOLOGY | Facility: HOSPITAL | Age: 70
End: 2020-10-30
Attending: INTERNAL MEDICINE

## 2020-11-04 ENCOUNTER — OFFICE VISIT (OUTPATIENT)
Dept: SURGERY | Facility: CLINIC | Age: 70
End: 2020-11-04

## 2020-11-04 VITALS
HEIGHT: 65 IN | DIASTOLIC BLOOD PRESSURE: 72 MMHG | WEIGHT: 170 LBS | TEMPERATURE: 97.1 F | OXYGEN SATURATION: 98 % | SYSTOLIC BLOOD PRESSURE: 154 MMHG | BODY MASS INDEX: 28.32 KG/M2

## 2020-11-04 DIAGNOSIS — I35.1 NONRHEUMATIC AORTIC VALVE INSUFFICIENCY: ICD-10-CM

## 2020-11-04 DIAGNOSIS — Z80.3 FH: BREAST CANCER: Primary | ICD-10-CM

## 2020-11-04 DIAGNOSIS — N64.1 FAT NECROSIS OF BREAST: ICD-10-CM

## 2020-11-04 DIAGNOSIS — R63.4 WEIGHT LOSS: ICD-10-CM

## 2020-11-04 DIAGNOSIS — N63.0 LUMP OR MASS IN BREAST: ICD-10-CM

## 2020-11-04 DIAGNOSIS — N64.89 BREAST ASYMMETRY: ICD-10-CM

## 2020-11-04 DIAGNOSIS — D05.12 DUCTAL CARCINOMA IN SITU (DCIS) OF LEFT BREAST: ICD-10-CM

## 2020-11-04 DIAGNOSIS — R92.8 ABNORMAL ULTRASOUND OF BREAST: ICD-10-CM

## 2020-11-04 PROCEDURE — 99214 OFFICE O/P EST MOD 30 MIN: CPT | Performed by: SURGERY

## 2020-11-04 RX ORDER — ANASTROZOLE 1 MG/1
1 TABLET ORAL DAILY
COMMUNITY
Start: 2020-08-17 | End: 2021-12-14 | Stop reason: SDUPTHER

## 2020-11-04 RX ORDER — MULTIPLE VITAMINS W/ MINERALS TAB 9MG-400MCG
1 TAB ORAL DAILY
COMMUNITY
End: 2021-11-04

## 2020-11-04 NOTE — PROGRESS NOTES
Chief Complaint: Melina Martin is a 69 y.o. female who was seen in consultation at the request of Mere Pena DO  for newly diagnosed breast cancer and a followup visit    History of Present Illness:  Patient presents with newly diagnosed DCIS, LEFT breast.  She noted no new masses, skin changes, nipple discharge, nipple changes prior to her most recent imaging.    Her most recent imaging includes the followin/15/2018  [facility]  [place]  SCREENING BILATERAL MAMOGRAM WITH 3D TOMOSYNTHESIS  BIRADS 1 NEGATIVE  Scattered areas of fibroglandular density.    2019  [facility]  [facility] [place]  SCREENING BILATERAL MAMOGRAM WITH 3D TOMOSYNTHESIS  FINDINGS:  5 mm asymmetry lower left breast, middle depth.  BIRADS 0  Scattered areas of fibroglandular density.    2019  [facility]  [place]  DIAGNOSTIC LEFT ANGELICA  US BREAST LEFT  FINDINGS:  Mammogram: Persistence of a 0.7 cm, high density, bandlike asymmetry in the medial left breast.  Ultrasound: Left breast 9 o’clock, 3 cm from nipple is a 0.7 x 0.9 cm elongated duct with internal debris that correlates with the mammographic lesion.  BI-RADS CATEGORY: 4A    19  [facility]  BILA BREAST MRI   MELINA MARTIN   Left breast 9 o’clock middle third 4 cm from the nipple metallic clip seen along the medial margin of the residual area clumped enhancement 1.4 cm anterior to posterior, 0.7 cm superior to inferior and 0.6 cm medial to lateral. No other areas left breast. No evidence for left axillary or internal mammary chain adenopathy. No areas of abnormal enhancement right breast.       She had a biopsy on the following day that showed:  2019  [facility]   Radiology  Melina Martin  ULTRASOUND BREAST BX  9 o’clock anterior medial left breast.  12 gauge Encor.  3 core biopsy specimens. A ribbon shaped clip. Positioning of the clip was confirmed.  ADDENDUM:  Tiny focus of possible microinvasive carcinoma.  Result is concordant.    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 Deeper levels additionally demonstrate rare fragments of detached carcinoma within pools of mucin. These findings may represent mucinous carcinoma.    She has not had a breast biopsy in the past.  She has her uterus and ovaries, is postmenopausal, and takes nor hormones.  Her family history includes the following: She has 2 daughters, no sons, no sisters, 2 maternal aunts, no paternal aunts.  One maternal aunt had breast cancer in her 80s.  Her mother lived to be 101 and did not have malignancy.      Pathology report from June 13, 2019 left breast needle localized lumpectomy.  Focal low-grade duct carcinoma in situ, 6 mm, cribriform, low-grade without necrosis.  No invasive carcinoma, but stains are being performed to rule out invasion in the pools of mucin.  All margins are clear.  The closest margin on the lumpectomy was 1.5 mm from the superior margin.  Additional superior margin negative for malignancy.  Pathologic stage Tis N0 stage 0.     Denies significant discomfort, redness, warmth, drainage from incision.    Interval History:  In the interim,  Melina Martin  has done well.    She took radiation with Dr. Sanderson in [place] from August 5 through August 26, 2019 in 16 fractions no boost.    Dr. Sharma at [facility] started her on Arimidex September 16, 2019.  She tolerates this well other than difficulty with sleep.  She has had an intentional 15 pound weight loss in order to minimize her breast cancer recurrence risk.    She feels a marble-like  lesion in the left breast medially at her incision since radiation.  Not particularly tender.  She has noted no other changes in her breast exam. No other new masses, skin changes, nipple changes, nipple discharge either breast.   She denies headache, bone pain, belly pain, cough, changes in vision or gait.    Her most recent imaging includes the following:  [facility] September 10, 2020 bilateral screening mammogram with 3D.  Postop changes 9:00 left breast there is a 4.5 cm postoperative seroma.  BI-RADS 0 recommend left breast ultrasound.      [facility] September 23, 2020 bilateral screening mammogram with tomosynthesis and left breast ultrasound.  Left breast ultrasound 9:00, 4 cm from the nipple at the lumpectomy bed is a 1.9 cm mass compatible with a postoperative seroma.  There is an area of 1.2 cm heterogenous mixed hypoechoic component avascular and favored to be fat necrosis.  Recommend short-term follow-up ultrasound in 4 months.  BI-RADS 3      She is here for review      Review of Systems:  Review of Systems   Constitutional: Negative for unexpected weight change (25 lb wt loss).   Eyes: Positive for eye problems.   Respiratory: Positive for wheezing.    Endocrine: Positive for hot flashes.   All other systems reviewed and are negative.       Past Medical and Surgical History:  Breast Biopsy History:  Patient had not had a breast biopsy prior to her cancer diagnosis.  Breast Cancer HIstory:  Patient does not have a past medical history of breast cancer.  Breast Operations, and year:  NONE   Obstetric/Gynecologic History:  Age menstrual periods began: 13  Patient is postmenopausal, entered menopause naturally at age: 7[birth year]2000   Number of pregnancies:2  Number of live births: 2  Number of abortions or miscarriages: 0  Age of delivery of first child: 28  Patient breast fed, for the following lenth of time: APPROX 1 YR   Length of time taking birth control pills: 7-10 YRS   Patient has  never taken hormone replacement  PATIENT HAS BOTH OVARIES AND UTERUS.     Past Surgical History:   Procedure Laterality Date   • BREAST BIOPSY Left     MALIGNANT(LOW GRADE DUCTAL CARCINOMA)   • BREAST LUMPECTOMY Left 2019    Procedure: Left breast needle localized lumpectomy,;  Surgeon: Beatriz Fuentes MD;  Location: [facility] OR [facility];  Service: General   •  SECTION      X 1   • COLONOSCOPY     • HAND SURGERY Right    • OVARIAN CYST REMOVAL         Past Medical History:   Diagnosis Date   • Anxiety    • Cancer (CMS/HCC)     LEFT   • Cataract    • GERD (gastroesophageal reflux disease)    • Heart murmur    • HTN (hypertension)    • Hyperlipidemia    • Sinus infection 2019    PT PRESCRIBED ANTIBIOTIC FINISHED 2019        Prior Hospitalizations, other than for surgery or childbirth, and year:  NONE     Social History     Socioeconomic History   • Marital status: Single     Spouse name: Not on file   • Number of children: Not on file   • Years of education: Not on file   • Highest education level: Not on file   Tobacco Use   • Smoking status: Former Smoker     Packs/day: 0.25     Years: 16.00     Pack years: 4.00     Types: Cigarettes     Quit date:      Years since quittin.8   • Smokeless tobacco: Never Used   Substance and Sexual Activity   • Alcohol use: Yes     Comment: 1-2 DRINKS MONTHLY    • Drug use: No   • Sexual activity: Defer     Patient is .  Patient is retired.  Patient drinks 1-2 servings of caffeine per day.    Family History:  Family History   Problem Relation Age of Onset   • Colon cancer Mother 89   • Hearing loss Mother    • Hypertension Mother    • Hearing loss Father    • Heart attack Father    • Diabetes Brother    • Heart attack Brother    • Depression Daughter    • Breast cancer Maternal Aunt         UNKNOWN AGE    • Cancer Cousin         FEMALE CANCER OF UNKNOWN ORGIN 60S   • Malig Hyperthermia Neg Hx        Vital Signs:  /72   Temp 97.1 °F (36.2  "°C)   Ht 164.5 cm (64.75\")   Wt 77.1 kg (170 lb)   LMP  (LMP Unknown)   SpO2 98%   Breastfeeding No   BMI 28.51 kg/m²      Medications:    Current Outpatient Medications:   •  anastrozole (ARIMIDEX) 1 MG tablet, , Disp: , Rfl:   •  APPLE CIDER VINEGAR PO, Take  by mouth., Disp: , Rfl:   •  Calcium Carb-Cholecalciferol (CALCIUM 500 +D PO), Take  by mouth., Disp: , Rfl:   •  carvedilol (COREG) 6.25 MG tablet, , Disp: , Rfl:   •  Coenzyme Q10 (CO Q 10 PO), Take  by mouth., Disp: , Rfl:   •  Glucosamine-Chondroitin (OSTEO BI-FLEX REGULAR STRENGTH PO), Take  by mouth., Disp: , Rfl:   •  Multiple Vitamins-Minerals (ZINC PO), Take  by mouth., Disp: , Rfl:   •  multivitamin with minerals tablet tablet, Take 1 tablet by mouth Daily., Disp: , Rfl:   •  rosuvastatin (CRESTOR) 20 MG tablet, , Disp: , Rfl:   •  simvastatin (ZOCOR) 40 MG tablet, Take 40 mg by mouth Every Night., Disp: , Rfl:      Allergies:  No Known Allergies    Physical Examination:  /72   Temp 97.1 °F (36.2 °C)   Ht 164.5 cm (64.75\")   Wt 77.1 kg (170 lb)   LMP  (LMP Unknown)   SpO2 98%   Breastfeeding No   BMI 28.51 kg/m²   General Appearance:  Patient is in no distress.  She is well kept and has an overweight build.   Psychiatric:  Patient with appropriate mood and affect. Alert and oriented to self, time, and place.    Breast, RIGHT:  medium sized, asymmetric with the contralateral side, as below.  Breast skin is without erythema, edema, rashes.  There are no visible abnormalities upon inspection during the arm-raising maneuver or with hands on hips in the sitting position. There is no nipple retraction, discharge or nipple/areolar skin changes.There are no masses palpable in the sitting or supine positions.    Breast, LEFT:  small sized, asymmetric with the contralateral side, right breast is a 38B and left breast is a 38A.  There are mild ecchymoses left medial breast from her recent biopsy.  Breast skin is without erythema, edema, " rashes.  There are no visible abnormalities upon inspection during the arm-raising maneuver or with hands on hips in the sitting position. There is no nipple retraction, discharge or nipple/areolar skin changes. At the 9:00 location of her incision from her 2019 lumpectomy for ductal carcinoma in situ, there is a subcentimeter smoothly marginated superficial mass at the medial margin closest to her nipple areolar complex.  This is consistent with fat necrosis which was seen on her ultrasound 2020   There are no other masses palpable in the sitting or supine positions.      Lymphatic:  There is no axillary, cervical, infraclavicular, or supraclavicular adenopathy bilaterally.  Eyes:  Pupils are round and reactive to light.  Cardiovascular:  Heart rate and rhythm are regular. There is a blowing murmur present.  Respiratory:  Lungs are clear bilaterally with no crackles or wheezes in any lung field.  Gastrointestinal:  Abdomen is soft, nondistended, and nontender.     Musculoskeletal:  Good strength in all 4 extremities.   There is good range of motion in both shoulders.    Skin:  No new skin lesions or rashes on the skin excluding the breast (see breast exam above).        Imagin2017  [facility]  BIRADS 2 BENIGN  Scattered areas of fibroglandular density.    02/15/2018  [facility]  SCREENING BILATERAL MAMOGRAM WITH 3D TOMOSYNTHESIS  BIRADS 1 NEGATIVE  Scattered areas of fibroglandular density.    2019  [facility]  SCREENING BILATERAL MAMOGRAM WITH 3D TOMOSYNTHESIS  FINDINGS:  5 mm asymmetry lower left breast, middle depth.  BIRADS 0  Scattered areas of fibroglandular density.    2019  [facility]  DIAGNOSTIC LEFT ANGELICA  US BREAST LEFT  FINDINGS:  Mammogram: Persistence of a 0.7 cm, high density, bandlike asymmetry in the medial left breast.  Ultrasound: Left breast 9  o’clock, 3 cm from nipple is a 0.7 x 0.9 cm elongated duct with internal debris that correlates with the mammographic lesion.  BI-RADS CATEGORY: 4A    04/30/19  [facility]  BILA BREAST MRI   MELINA MARTIN [place]   Left breast 9 o’clock middle third 4 cm from the nipple metallic clip seen along the medial margin of the residual area clumped enhancement 1.4 cm anterior to posterior, 0.7 cm superior to inferior and 0.6 cm medial to lateral. No other areas left breast. No evidence for left axillary or internal mammary chain adenopathy. No areas of abnormal enhancement right breast.     [facility] September 10, 2020 bilateral screening mammogram with 3D.  Postop changes 9:00 left breast there is a 4.5 cm postoperative seroma.  BI-RADS 0 recommend left breast ultrasound.  We will arrange for a left ultrasound and see her back after.    [facility] September 23, 2020 bilateral screening mammogram with tomosynthesis and left breast ultrasound.  Left breast ultrasound 9:00, 4 cm from the nipple at the lumpectomy bed is a 1.9 cm mass compatible with a postoperative seroma.  There is an area of 1.2 cm heterogenous mixed hypoechoic component avascular and favored to be fat necrosis.  Recommend short-term follow-up ultrasound in 4 months.  BI-RADS 3        Pathology:  04/22/2019  [facility]   Radiology  Melina Martin  ULTRASOUND BREAST BX  9 o’clock anterior medial left breast.  12 gauge Encor.  3 core biopsy specimens. A ribbon shaped clip. Positioning of the clip was confirmed.  ADDENDUM:  Tiny focus of possible microinvasive carcinoma. Result is concordant.    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 Deeper levels additionally demonstrate rare fragments of detached carcinoma within pools of mucin. These findings may represent mucinous carcinoma.    Pathology report from June 13, 2019 left breast needle localized lumpectomy.  Focal low-grade duct carcinoma in situ, 6 mm, cribriform, low-grade without necrosis.  No invasive carcinoma, but stains are being performed to rule out invasion in the pools of mucin.  All margins are clear.  The closest margin on the lumpectomy was 1.5 mm from the superior margin.  Additional superior margin negative for malignancy.  Pathologic stage Tis N0 stage 0.  I called and let her know.  We will arrange for her to see both Dr. Muniz at [facility] and Dr. Sanderson from radiation at [facility].         Final Diagnosis   1. Left Breast Lumpectomy:               A. Focal low grade ductal carcinoma in situ (DCIS).                            1. Area of DCIS spans 6.0 x 5.0 x 2.0 mm.                            2. Predominantly cribriform type with low nuclear grade features without necrosis.               B. No invasive carcinoma identified by routine or IHC staining (see comment).               C. Associated pool of extravasated mucin associated with DCIS.               D. All margins are negative for DCIS.                    DCIS measures 1.5 mm from the closest (superior) margin of excision.                    All other margins measure at least 3 mm from DCIS including:                            Lateral margin: 3 mm                            Medial margin: 23 mm                            Anterior margin: 20 mm                            Posterior margin: 30 mm                             Inferior margin: 6 mm                  E. Stromal scar, foreign body giant cell reaction and changes consistent with previous biopsy site noted.               F. No lymphovascular space invasion by malignancy  identified.               G. DCIS previously reported Estrogen receptor positive and Progesterone receptor per outside report                    ([facility]).                  Pathologic stage: pTis (DCIS), NX (G1).               See synoptic for tumor details     2. New Lateral Margin:               A. No in situ nor infiltrating carcinoma.               B. New lateral margin is negative for malignancy by additional 8 mm.     3. New Anterior Margin:               A. No in situ nor infiltrating carcinoma.               B. New anterior margin is negative for malignancy by additional 5 mm.     4. New Posterior Margin:               A. No in situ nor infiltrating carcinoma.               B. New posterior margin is negative for malignancy by additional 10 mm.     5. New Medial Margin:               A. No in situ nor infiltrating carcinoma.               B. New medial margin is negative for malignancy by additional 6 mm.     6. New Inferior Margin:               A. No in situ nor infiltrating carcinoma.               B. New inferior margin is negative for malignancy by additional 8 mm.     7. New Superior Margin:               A. No in situ nor infiltrating carcinoma.               B. New superior margin is negative for malignancy by additional 8 mm.     [id number]/[id number]    Preliminary result electronically signed by Ladarius Franco MD on 6/14/2019 at 1554   Synoptic Checklist   DCIS OF THE BREAST   Breast DCIS - All Specimens            CLINICAL   Radiologic Finding   Mass or architectural distortion    SPECIMEN   Procedure   Excision (less than total mastectomy)    Specimen Laterality   Left    TUMOR   Tumor Site       Clock Position of Tumor Site   9 o'clock    Histologic Type   Ductal carcinoma in situ    Size (Extent) of DCIS   Estimated size of DCIS greatest dimension in Millimeters (mm) is at least: 6 Millimeters (mm)   Additional Dimension in Millimeters (mm)   5 Millimeters (mm)       2 Millimeters (mm)    Architectural Patterns   Cribriform    Nuclear Grade   Grade I (low)    Accessory Findings       Necrosis   Not identified    Microcalcifications   Not identified    MARGINS   Margins   Uninvolved by DCIS    Distance of DCIS from Anterior Margin in Millimeters (mm)   25 Millimeters (mm)   Distance of DCIS from Posterior Margin in Millimeters (mm)   40 Millimeters (mm)   Distance of DCIS from Superior Margin in Millimeters (mm)   9.5 Millimeters (mm)   Distance of DCIS from Inferior Margin in Millimeters (mm)   14 Millimeters (mm)   Distance of DCIS from Medial Margin in Millimeters (mm)   29 Millimeters (mm)   Distance of DCIS from Lateral Margin in Millimeters (mm)   11 Millimeters (mm)   Distance from Closest Margin in Millimeters (mm)   9.5 Millimeters (mm)   Closest Margin   Superior    LYMPH NODES   Regional Lymph Nodes   No lymph nodes submitted or found    PATHOLOGIC STAGE CLASSIFICATION (pTNM, AJCC 8th Edition)   TNM Descriptors   Not applicable    Primary Tumor (pT)   pTis (DCIS)    Regional Lymph Nodes (pN)       Category (pN)   pNX    .      Comment P   IHC stains for P63 and pancytokeratin will be performed in house utilizing appropriate controls to evaluate focal pool of mucin for evidence of carcinoma and reported separately.                 Preoperative EKG showed the following:  - ABNORMAL ECG -  Sinus rhythm  RBBB and LAFB  Left ventricular hypertrophy        We will contact her cardiologist Dr Rahman and fax over the EKG so he can have a look.    Note from Dr. Muniz dated July 17, 2019.  Diagnosis duct carcinoma in situ left.  Information given for [facility].  Follow-up with radiation therapy for adjuvant treatment.  She does express a strong disinterest and adjuvant therapy secondary to concerns regarding side effects and the limited benefit.  She reports that she will consider this option in the interim.  She will first proceed with adjuvant radiation therapy.    Note from Dr. Ila Sharma  from [facility] dated September 16, 2019: After long discussion she is agreeable to try anastrozole for a few weeks.  Return to the clinic in 6 weeks.  We will also obtain a DEXA scan.  She continues to take calcium and vitamin D    Note from [facility] October 29, 2019.  Ila Sharma MD.  Patient started Arimidex September 16, 2019.  Initially had difficulty with sleep but this is improved.  Hot flashes but they are manageable.  Continue the medication for now.  Follow-up in 3 months.    Note from Dr. Ila Sharma medical oncology [facility] March 5, 2020: Plan duct carcinoma in situ patient is tolerating Arimidex well.        Procedures:      Assessment:   Diagnosis Plan   1. FH: breast cancer     2. Breast asymmetry     3. Nonrheumatic aortic valve insufficiency     4. Weight loss     5. Lump or mass in breast  US Breast Left Limited   6. Fat necrosis of breast  US Breast Left Limited   7. Abnormal ultrasound of breast  US Breast Left Limited   8. Ductal carcinoma in situ (DCIS) of left breast       9-  LEFT breast 9:00, 3.5 CFN- 9mm US, 7mm mammogram, 1.4 cm MRI  Low-grade ductal carcinoma in situ, 4 mm and a core, could perform a mucinous, versus microinvasive mucinous carcinoma.  Estrogen 100%, progesterone 100%.  Ribbon marker left in good position.  Clinical stage T IS, N0 stage 0.    Pathology report from June 13, 2019 left breast needle localized lumpectomy.  Focal low-grade duct carcinoma in situ, 6 mm, cribriform, low-grade without necrosis.  No invasive carcinoma, but stains are being performed to rule out invasion in the pools of mucin.  All margins are clear.  The closest margin on the lumpectomy was 1.5 mm from the superior margin.  Additional superior margin negative for malignancy.  Pathologic stage Tis N0 stage 0.     Dr. Sanderson in [place] from August 5 through August 26, 2019 in 16 fractions no boost.    Dr. Sharma at [facility] started her on Arimidex September 16, 2019.        1-  1 of 2 MA in her 80s. Mother lived until 100 with no breast ca      2-  RIGHT larger than LEFT by a cup preoperatively- 38 B versus 38A- aggravated by BCT on the LEFT    3-  Dr Robert milton [facility] is cardiologist.    4-  15# intentional [id number]    5-7   LEFT breast lateral margin of her LEFT 9:0 incision- fat necrosis clinically and in ultrasound - BR3    Plan:  Melina and I reviewed her interval history, imaging, imaging reports, and examination  together today.     There is no evidence of disease on exam or imaging.    We reviewed the nature of fat necrosis and that of a BIRADS 3 designation being 97% likely to be a benign finding to account for the imaging abnormality.  Clinically the lesion is consistent with fat necrosis.    For this, I will arrange for a LEFT US at [facility] and to see our nurse Padmini Funez after for review.    We will follow her for 5 years in our office since this is a duct carcinoma in situ.  Her next routine mammogram will be due September 11, 2021 at [facility] screening.        I saluted her on her weight loss.    She has follow-up with Dr. Sanderson and Dr. Sharma.  We gave her a puff to help with her asymmetry.  I offered for her to talk with one of our plastic surgeons to help with the symmetry.  She politely declined this.  We talked about this preoperatively and she is not bothered by the asymmetry, she has had it her whole life.      I asked her to continue her self breast exam and to call us in the interim with any concerns would be happy to see her back sooner.            Beatriz Fuentes MD    25 minuite visit, 15 face to face   Next Appointment:  Return for Next scheduled follow up, after imaging, with Padmini Funez.      EMR Dragon/transcription disclaimer:    Much of this encounter note is an electronic transcription/translocation of spoken language to printed text.  The electronic translation of spoken  language may permit erroneous, or at times, nonsensical words or phrases to be inadvertently transcribed.  Although I have reviewed the note from such areas, some may still exist.

## 2020-12-09 ENCOUNTER — TELEPHONE (OUTPATIENT)
Dept: SURGERY | Facility: CLINIC | Age: 70
End: 2020-12-09

## 2020-12-09 NOTE — TELEPHONE ENCOUNTER
Scheduled Left Breast U/S at  [facility] 2-22-21 @ 10:00    Return to see Padmini Funez NP  3-1-21 @ 12:30    Mara

## 2021-02-22 ENCOUNTER — HOSPITAL ENCOUNTER (OUTPATIENT)
Dept: ULTRASOUND IMAGING | Facility: HOSPITAL | Age: 71
Discharge: HOME | End: 2021-02-22
Attending: SURGERY

## 2021-02-26 ENCOUNTER — TELEPHONE (OUTPATIENT)
Dept: SURGERY | Facility: CLINIC | Age: 71
End: 2021-02-26

## 2021-02-26 NOTE — TELEPHONE ENCOUNTER
Melina Martin called,  She is canceling her appt with Padmini Funez NP on Monday 3-1-21    She does not drive to [place], she is going to follow with  and will call in the future if she needs us.    Mara

## 2021-03-22 ENCOUNTER — DOCUMENTATION (OUTPATIENT)
Dept: SURGERY | Facility: CLINIC | Age: 71
End: 2021-03-22

## 2021-04-01 ENCOUNTER — CONVERSION ENCOUNTER (OUTPATIENT)
Dept: CARDIOLOGY | Facility: CLINIC | Age: 71
End: 2021-04-01

## 2021-04-01 ENCOUNTER — OFFICE VISIT CONVERTED (OUTPATIENT)
Dept: CARDIOLOGY | Facility: CLINIC | Age: 71
End: 2021-04-01
Attending: INTERNAL MEDICINE

## 2021-04-20 ENCOUNTER — CONVERSION ENCOUNTER (OUTPATIENT)
Dept: CARDIOLOGY | Facility: CLINIC | Age: 71
End: 2021-04-20
Attending: INTERNAL MEDICINE

## 2021-04-30 ENCOUNTER — OFFICE VISIT CONVERTED (OUTPATIENT)
Dept: ONCOLOGY | Facility: HOSPITAL | Age: 71
End: 2021-04-30
Attending: INTERNAL MEDICINE

## 2021-04-30 ENCOUNTER — HOSPITAL ENCOUNTER (OUTPATIENT)
Dept: ONCOLOGY | Facility: HOSPITAL | Age: 71
Discharge: HOME | End: 2021-04-30
Attending: INTERNAL MEDICINE

## 2021-05-11 NOTE — PROCEDURES
"   Procedure Note      Patient Name: Melina Martin   Patient ID: [id number]   Sex: Female   YOB: 1950    Primary Care Provider: Yuliet GONZALEZ   Referring Provider: Yuliet GONZALEZ    Visit Date: April 20, 2021    Provider: Joo Tovar MD   [place] [facility]   Location Address: [address] [place] [address], [address]   [place]  [id number]   Location Phone: [phone]          FINAL REPORT   TRANSTHORACIC ECHOCARDIOGRAM REPORT    Diagnosis: Aortic Stenosis   Height: 5'5\" Weight: 173 B/P: 140/66 BSA: 1.9   Tech: BNS   MEASUREMENTS:  RVID (Diastole) : RVID. (NORMAL: 0.7 to 2.4 cm max)   LVID (Systole): 1.5 cm (Diastole): 3.1 cm. (NORMAL: 3.7 - 5.4 cm)   Posterior Wall Thickness (Diastole): 1.3 cm. (NORMAL: 0.8 - 1.1 cm)   Septal Thickness (Diastole): 1.9 cm. (NORMAL: 0.7 - 1.2 cm)   LAID (Systole): 3.8 cm. (NORMAL: 1.9 - 3.8 cm)   Aortic Root Diameter (Diastole): 2.6 cm. (NORMAL: 2.0 - 3.7 cm)   DOPPLER: Continuous-wave, pulse-wave, and color-flow examination of the mitral, aortic, and tricuspid valves was performed. There was severe aortic stenosis with a peak velocity of 407 cm/sec, mean gradient of 39 mmHg. There was also trace mitral regurgitation. Doppler flow velocities were normal. E/A ratio is 1.0. DT= 250 msec. IVRT is 85 msec. E/E' is 15.   COMMENTS:  The patient underwent 2-D, M-Mode, and Doppler examination, including pulse-wave, continuous-wave, and color-flow analysis; the study is technically adequate.   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   MITRAL VALVE: Appeared to be normal. No evidence of any obstruction. No regurgitation.   TRICUSPID VALVE: Appeared to be normal.   PULMONIC VALVE: Not well seen.   LEFT ATRIUM: Moderately enlarged. LA volume index is 21 mL/m2.   AORTIC ROOT: Appeared to be normal in size.   LEFT VENTRICLE: Appeared to have normal left ventricular systolic function with hyperdynamic EF of 60% with mild " left ventricular hypertrophy.   RIGHT ATRIUM: Appeared to be normal; no obvious evidence of intracavity masses or clots.   RIGHT VENTRICLE: Normal size and function.   PERICARDIUM: Unremarkable. No evidence of effusion.   INFERIOR VENA CAVA: Diameter is 1.5 cm.   Fax: 04/29/2021      CONCLUSION:  1.  Hyperdynamic ejection fraction of 60%.  2.  Mild left ventricular hypertrophy.  3.  Severe aortic stenosis, mean gradient of 39 mmHg.   4.  Trace mitral regurgitation.       Joo Tovar MD  /pap                 Electronically Signed by: Charisma Levine-, Other -Author on April 29, 2021 10:31:04 AM  Electronically Co-signed by: Joo Tovar MD -Reviewer on May 5, 2021 05:35:19 AM

## 2021-05-14 VITALS
HEIGHT: 65 IN | SYSTOLIC BLOOD PRESSURE: 148 MMHG | WEIGHT: 173 LBS | BODY MASS INDEX: 28.82 KG/M2 | HEART RATE: 74 BPM | DIASTOLIC BLOOD PRESSURE: 60 MMHG

## 2021-05-14 NOTE — PROGRESS NOTES
"   Progress Note      Patient Name: Mleina Martin   Patient ID: [id number]   Sex: Female   YOB: 1950    Primary Care Provider: Yuliet GONZALEZ   Referring Provider: Yuliet GONZALEZ    Visit Date: April 1, 2021    Provider: Joo Tovar MD   [facility]   Location Address: [address] [place] [address], [address]   [place]  [id number]   Location Phone: [phone]          Chief Complaint     Aortic stenosis.       History Of Present Illness  REFERRING CARE PROVIDER: Maria Ines GONZALEZ   Melina Martin is a 70 year old /White female with a previous history of dyslipidemia and aortic stenosis who follows up today in the office. She just has occasional heart palpitations. No chest pain, shortness of breath, or syncopal spells.   PAST MEDICAL HISTORY: Positive for hypertension, aortic stenosis, and hyperlipidemia.   PSYCHOSOCIAL HISTORY: Rarely uses alcohol. Previous use of tobacco, but quit.   CURRENT MEDICATIONS: Medication list was reviewed and is as documented.      ALLERGIES: No known drug allergies.       Review of Systems  · Cardiovascular  o Admits  o : palpitations (fast, fluttering, or skipping beats)  o Denies  o : swelling (feet, ankles, hands), shortness of breath while walking or lying flat, chest pain or angina pectoris   · Respiratory  o Denies  o : chronic or frequent cough      Vitals  Date Time BP Position Site L\R Cuff Size HR RR TEMP (F) WT  HT  BMI kg/m2 BSA m2 O2 Sat FR L/min FiO2 HC       04/01/2021 10:11 /60 Sitting    74 - R   173lbs 0oz 5'  5\" 28.79 1.9       04/01/2021 10:11 /66 Sitting                       Physical Examination  · Constitutional  o Appearance  o : Awake, alert, in no acute distress.   · Eyes  o Conjunctivae  o : Normal.  · Ears, Nose, Mouth and Throat  o Oral Cavity  o :   § Oral Mucosa  § : Normal.  · Neck  o Inspection/Palpation  o : No JVD. Good carotid upstroke. No thyromegaly.  · Respiratory  o Respiratory  o : " Good respiratory effort. Clear to percussion and auscultation.  · Cardiovascular  o Heart  o :   § Auscultation of Heart  § : 2/6 systolic murmur early to mid peaking in the aortic listening area.   o Peripheral Vascular System  o :   § Extremities  § : Good femoral and pedal pulses. No pedal edema.  · Gastrointestinal  o Abdominal Examination  o : Soft. No tenderness or masses felt. No hepatosplenomegaly. Abdominal aorta is not palpable.  · Labs  o Labs  o : , HDL 49, creatinine 0.73.          Assessment     ASSESSMENT & PLAN:    1.  Aortic valve stenosis, nonrheumatic in nature, clinically stable.  Will reassess with echocardiogram to        evaluate any change in severity of stenosis.   2.  Hypertension, controlled.  Continue with the current dose of losartan.   3.  Dyslipidemia.  The patient is on high intensity statin as well as Vascepa.  LDL within treatment goal of 102.     Joo Tovar MD  JH/rt             Electronically Signed by: Janet Hayes-, Other -Author on April 12, 2021 04:08:33 PM  Electronically Co-signed by: Joo Tovar MD -Reviewer on April 13, 2021 05:25:36 PM

## 2021-05-16 VITALS
SYSTOLIC BLOOD PRESSURE: 120 MMHG | BODY MASS INDEX: 24.79 KG/M2 | HEART RATE: 78 BPM | HEIGHT: 72 IN | WEIGHT: 183 LBS | DIASTOLIC BLOOD PRESSURE: 86 MMHG

## 2021-05-16 VITALS
WEIGHT: 197 LBS | SYSTOLIC BLOOD PRESSURE: 152 MMHG | HEART RATE: 81 BPM | DIASTOLIC BLOOD PRESSURE: 94 MMHG | BODY MASS INDEX: 32.82 KG/M2 | HEIGHT: 65 IN

## 2021-05-22 ENCOUNTER — TRANSCRIBE ORDERS (OUTPATIENT)
Dept: ONCOLOGY | Facility: HOSPITAL | Age: 71
End: 2021-05-22

## 2021-05-22 DIAGNOSIS — I35.0 AORTIC VALVE STENOSIS, ETIOLOGY OF CARDIAC VALVE DISEASE UNSPECIFIED: Primary | ICD-10-CM

## 2021-05-22 DIAGNOSIS — Z78.0 POST-MENOPAUSAL: ICD-10-CM

## 2021-05-22 DIAGNOSIS — Z12.31 VISIT FOR SCREENING MAMMOGRAM: ICD-10-CM

## 2021-05-28 VITALS
OXYGEN SATURATION: 97 % | DIASTOLIC BLOOD PRESSURE: 57 MMHG | WEIGHT: 172.4 LBS | BODY MASS INDEX: 28.69 KG/M2 | WEIGHT: 167.11 LBS | HEART RATE: 60 BPM | RESPIRATION RATE: 18 BRPM | SYSTOLIC BLOOD PRESSURE: 198 MMHG | OXYGEN SATURATION: 100 % | TEMPERATURE: 97.4 F | DIASTOLIC BLOOD PRESSURE: 83 MMHG | TEMPERATURE: 97.3 F | BODY MASS INDEX: 28.68 KG/M2 | SYSTOLIC BLOOD PRESSURE: 142 MMHG | HEART RATE: 63 BPM

## 2021-05-28 VITALS
WEIGHT: 193.78 LBS | TEMPERATURE: 97.1 F | BODY MASS INDEX: 33.46 KG/M2 | OXYGEN SATURATION: 100 % | HEIGHT: 64 IN | DIASTOLIC BLOOD PRESSURE: 74 MMHG | DIASTOLIC BLOOD PRESSURE: 77 MMHG | HEART RATE: 80 BPM | SYSTOLIC BLOOD PRESSURE: 153 MMHG | OXYGEN SATURATION: 97 % | SYSTOLIC BLOOD PRESSURE: 150 MMHG | WEIGHT: 195.99 LBS | HEART RATE: 83 BPM | DIASTOLIC BLOOD PRESSURE: 68 MMHG | BODY MASS INDEX: 33.08 KG/M2 | SYSTOLIC BLOOD PRESSURE: 159 MMHG | HEIGHT: 64 IN | TEMPERATURE: 97.1 F | HEART RATE: 76 BPM | OXYGEN SATURATION: 97 % | TEMPERATURE: 98 F | WEIGHT: 194 LBS | RESPIRATION RATE: 20 BRPM | BODY MASS INDEX: 33.3 KG/M2

## 2021-05-28 VITALS
HEART RATE: 82 BPM | OXYGEN SATURATION: 98 % | SYSTOLIC BLOOD PRESSURE: 156 MMHG | BODY MASS INDEX: 33.38 KG/M2 | HEIGHT: 64 IN | DIASTOLIC BLOOD PRESSURE: 82 MMHG | WEIGHT: 195.55 LBS | TEMPERATURE: 98 F

## 2021-05-28 NOTE — PROGRESS NOTES
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 Fuentes      2) Adjuvant XRT complete 19 - Dr. Sanderson      3) started adjuvant anastrazole 19            HPI - Oncology Interim      Patient comes in today for refill of anastrozole.  She is tolerating the     medication well.  She still has some sleep difficulty which she manages with     Tylenol PM.  She says that she is able to sleep for 8 hours and wakes up feeling    rested.  She has no other complaints with the medication.  On recent     mammogram/ultrasound she was found to have a seroma or area of fat necrosis.      She is seeing Dr. Fuentes again next week to address this issue.            Patient has continued exercising and watching her diet.  She has lost 30 pounds     and overall feels good.            Clinical Trial Participant      No            ECOG Performance Status      0            PAST, FAMILY   Past Medical History      Past Medical History:  High Cholesterol, Hypertension      Hematology/Oncology (F):  Breast Cancer            Past Surgical History      Biopsy                        Family History      Family History:  Breast Cancer, Colorectal Cancer, Myeloma            Social History      Lives independently:  No            Tobacco Use      Tobacco status:  Former smoker            Substance Use    "   Substance use:  Denies use            REVIEW OF SYSTEMS      General:  Denies: Appetite Change, Fatigue, Fever, Night Sweats, Weight Gain,     Weight Loss      Eye:  Denies Blurred Vision, Denies Corrective Lenses, Denies Diplopia, Denies     Vision Changes      ENT:  Denies Headache, Denies Hearing Loss, Denies Hoarseness, Denies Sore     Throat      Cardiovascular:  Denies Chest Pain, Denies Palpitations      Respiratory:  Denies: Cough, Coughing Blood, Productive Cough, Shortness of Air,    Wheezing      Gastrointestinal:  Denies Bloody Stools, Denies Constipation, Denies Diarrhea,     Denies Nausea/Vomiting, Denies Problem Swallowing, Denies Unable to Control     Bowels      Genitourinary:  Denies Blood in Urine, Denies Incontinence, Denies Painful     Urination      Musculoskeletal:  Denies Back Pain, Denies Muscle Pain, Denies Painful Joints      Integumentary:  Denies Itching, Denies Lesions, Denies Rash      Neurologic:  Denies Dizziness, Denies Numbness\Tingling, Denies Seizures      Psychiatric:  Denies Anxiety, Denies Depression      Endocrine:  Denies Cold Intolerance, Denies Heat Intolerance      Hematologic/Lymphatic:  Denies Bruising, Denies Bleeding, Denies Enlarged Lymph     Nodes      Reproductive:  Denies: Menopause, Heavy Periods, Pregnant, Still Menstruating            VITAL SIGNS AND SCORES      Vitals      Weight 167 lbs 1.739 oz / 75.8 kg      Temperature 97.3 F / 36.28 C - Temporal      Pulse 63      Respirations 18      Blood Pressure 198/83 Sitting, Left Arm      Pulse Oximetry 100%, RM AIR            Pain Score      Experiencing any pain?:  No      Pain Scale Used:  Numerical      Pain Intensity:  0            Fatigue Score      Experiencing any fatigue?:  No      Fatigue (0-10 scale):  0 (none)            EXAM      General: Alert, cooperative, no acute distress, well-appearing      Eyes: Anicteric sclera, PERRLA      Respiratory: normal respiratory effort      Cardiovascular: No lower  extremity edema      Skin: Normal tone, no rash      Psychiatric: Appropriate affect, intact judgment      Neurologic: No focal sensory or motor deficits, no weakness, numbness, dizziness      Musculoskeletal: Normal muscle tone      Extremities: No clubbing, cyanosis, or deformities            PREVENTION      Date Influenza Vaccine Given:  Oct 1, 2020      Influenza Vaccine Declined:  No      2 or More Falls in Past Year?:  No      Fall Past Year with Injury?:  No      Hx Pneumococcal Vaccination:  Yes      Encouraged to follow-up with:  PCP regarding preventative exams.      Chart initiated by      NELSON DOBBS            ALLERGY/MEDS      Allergies      Coded Allergies:             NO KNOWN DRUG ALLERGIES (Verified  Allergy, Mild, 10/30/20)            Medications      Last Reconciled on 10/30/20 13:21 by MARLA MCNULTY      Anastrozole (Anastrozole) 1 Mg Tablet      1 MG PO QDAY, #90 TAB 3 Refills         Prov: MARLA MCNULTY         10/30/20       Rosuvastatin Calcium (Crestor*) 40 Mg Tablet      40 MG PO HS, #30 TAB 0 Refills         Reported         2/28/20       Icosapent Ethyl (VASCEPA) 1 Gm Capsule      2 GM PO BID for 30 Days, #60 CAP         Reported         2/28/20       Calcium Carbonate/Vitamin D3 (OYSCO 500-VIT D3 200 TABLET) 1 Each Tablet      1 EACH PO QDAY, TABLET         Reported         12/23/19       Carvedilol (Carvedilol) 12.5 Mg Tablet      6.5 MG PO BID, #60 TAB 0 Refills         Prov: MARAL MCNULTY         9/16/19       Gluc Isabel/Chondro Isabel A/Vit C/Mn (Osteo Biflex) 1 Each Tablet      1 TAB PO QDAY, #30 TAB         Reported         7/17/19       Ubidecarenone (Co Q-10) 1 Each Capsule      100 MG PO BID, CAP         Reported         7/17/19       Naproxen Sodium (Aleve) 220 Mg Tablet      220 MG PO PRN, TAB         Reported         10/11/13       Multivitamins (Multiple Vitamin) 1 Each Tablet      1 TAB PO QDAY         Reported         10/11/13      Medications Reviewed:  No Changes  made to meds            IMPRESSION/PLAN      Diagnosis      Notes      Renewed Medications      * Anastrozole 1 MG TABLET: 1 MG PO QDAY #90         Dx: DCIS (ductal carcinoma in situ) - D05.10            Plan      DCIS: Patient is tolerating anastrozole well with the exception of some sleep     disturbance.  She is able to overcome this with Tylenol PM and melatonin.      Patient has no additional concerns with the exception of her recent finding on     breast imaging.  This is likely an area of fat necrosis versus a seroma.  She     has an appointment with Dr. Fuentes next week to address the issue.  Patient     will follow up with me in 6 months.  I will refill her anastrozole.            Patient Education      Patient Education Provided:  Yes            Electronically signed by MARLA MCNULTY  10/30/2020 13:21       Disclaimer: Converted document may not contain table formatting or lab diagrams. Please see [employer] [facility] for the authenticated document.

## 2021-05-28 NOTE — PROGRESS NOTES
"Patient: ADIN NIEVES     Acct: [id number]     Report: #[id number]  UNIT #: [id number]     : 1950    Encounter Date:2019  PRIMARY CARE: SID BRENNER  ***Signed***  --------------------------------------------------------------------------------------------------------------------  NURSE INTAKE      Visit Type      Established Patient Visit            Chief Complaint      BREAST CA            Referring Provider/Copies To      Referring Provider:  FLORINDA LUGO      Primary Care Provider:  DELTA LEE            History and Present Illness      Past Oncology Illness History      1) DCIS: LEFT breast, UIQ, Diagnosed 19; 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 Sanderson      3) patient considering adjuvant endocrine-based treatment (19)            HPI - Oncology Interim      Ms. Nieves comes in today after completing adjuvant radiation therapy on     2019 to discuss starting hormone therapy for DCIS.  The patient states that    radiation was a little tougher than she expected.  She has noticed some skin     changes in her left axilla.  She is also noticed darkening of her areole on the     left.  She is concerned that the skin changes will be permanent.  However     current skin changes are improving with topical cortisone that she is using.      She states that Dr. Sanderson her radiation oncologist helped to talk her into     adjuvant therapy.      She still has concerns about long-term consequences of adjuvant therapy but is     willing to give it a try.  We discussed the need for DEXA scan around the time     she starts anastrozole.  The patient will contact our clinic after a couple of     weeks to discuss how she is doing with the therapy and if there is a need to     change to a different medication.  She reports no history of " osteoporosis or     osteopenia.  She remains very active and walks every day.  She also takes     calcium and vitamin D every day.            Clinical Trial Participant      No            ECOG Performance Status      0            PAST, FAMILY   Past Medical History      Past Medical History:  High Cholesterol, Hypertension      Hematology/Oncology (F):  Breast Cancer (DCIS)            Past Surgical History      Biopsy                        Family History      Family History:  Breast Cancer (AUNT), Colorectal Cancer (MOTHER), Myeloma     (BROTHER)            Social History      Lives independently:  No            Tobacco Use      Tobacco status:  Former smoker      Quit status:  Quit date established ()            Alcohol Use      Alcohol intake:  OCCASSIONALLY-2/MONTH            Substance Use      Substance use:  Denies use            REVIEW OF SYSTEMS      General:  Admits: Fatigue;          Denies: Appetite Change, Fever, Night Sweats, Weight Gain, Weight Loss      Eye:  Denies Blurred Vision, Denies Corrective Lenses, Denies Diplopia, Denies     Vision Changes      ENT:  Denies Headache, Denies Hearing Loss, Denies Hoarseness, Denies Sore     Throat      Cardiovascular:  Denies Chest Pain, Denies Palpitations      Respiratory:  Denies: Coughing Blood, Productive Cough, Shortness of Air,     Wheezing      Gastrointestinal:  Denies Bloody Stools, Denies Constipation, Denies Diarrhea,     Denies Nausea/Vomiting, Denies Problem Swallowing, Denies Unable to Control     Bowels      Genitourinary:  Denies Blood in Urine, Denies Incontinence, Denies Painful     Urination      Musculoskeletal:  Denies Back Pain, Denies Muscle Pain, Denies Painful Joints      Integumentary:  Denies Itching, Denies Lesions, Denies Rash      Neurologic:  Denies Dizziness, Denies Numbness\Tingling, Denies Seizures      Psychiatric:  Denies Anxiety, Denies Depression      Endocrine:  Denies Cold Intolerance, Denies Heat  Intolerance      Hematologic/Lymphatic:  Denies Bruising, Denies Bleeding, Denies Enlarged Lymph     Nodes      Reproductive:  Denies: Menopause, Heavy Periods, Pregnant, Still Menstruating            VITAL SIGNS AND SCORES      Vitals      Height 5 ft 4.17 in / 163.0 cm      Weight 193 lbs 12.549 oz / 87.9 kg      BSA 1.93 m2      BMI 33.1 kg/m2      Temperature 97.1 F / 36.17 C - Temporal      Pulse 80      Blood Pressure 150/74 Sitting, Left Arm      Pulse Oximetry 97%, RM AIR            Pain Score      Experiencing any pain?:  No      Pain Scale Used:  Numerical      Pain Intensity:  0            Fatigue Score      Experiencing any fatigue?:  Yes      Fatigue (0-10 scale):  2            EXAM      General Appearance:  Positive for: Alert, Oriented x3, Cooperative      Eye:  Positive for: Anicteric Sclerae      Respiratory:  Positive for: CTAB      Breast - Left:  Positive for: Skin Changes (Well healed scar on the left breast     medial to the areola.  There is deep palpable scar tissue around the scar but     not new masses.  Her areola is darkend from radiation.  There is darkly     pigmented skin in the left axilla from radiation also. );          Negative for: Adenopathy, Discharge, Mass      Breast - Right:  Negative for: Adenopathy, Discharge, Mass, Skin Changes      Abdomen/Gastro:  Positive for: Normal Active Bowel Sounds      Cardiovascular:  Positive for: Murmur (systolic murmur consistent with aortic     stenosis)      Skin:  Negative for: Rash      Psychiatric:  Positive for: AAO X 3, Appropriate Affect, Intact Judgement      Upper Extremities:  Negative for: Edema      Lymphatic:  Negative for: Axillary            PREVENTION      Hx Influenza Vaccination:  Yes      Date Influenza Vaccine Given:  Oct 1, 2018      Influenza Vaccine Declined:  No      2 or More Falls Past Year?:  No      Fall Past Year with Injury?:  No      Hx Pneumococcal Vaccination:  Yes      Encouraged to follow-up with:  PCP  regarding preventative exams.      Chart initiated by      GUILLAUME SAHU [facility]            ALLERGY/MEDS      Allergies      Coded Allergies:             NO KNOWN DRUG ALLERGIES (Verified  Allergy, Mild, 9/16/19)            Medications            Carvedilol (Carvedilol) 12.5 Mg Tablet      6.5 MG PO BID, #60 TAB 0 Refills         Prov: MARLA MCNULTY         9/16/19       Calcium Carb/Vit D3 (CALCIUM 600-VIT D3 400 TABLET) 1 Each Tablet      1 EACH PO QDAY, #60 TAB 0 Refills         Reported         7/17/19       Gluc Isabel/Chondro Isabel A/Vit C/Mn (Osteo Biflex) 1 Each Tablet      1 TAB PO QDAY, #30 TAB         Reported         7/17/19       Ubidecarenone (Co Q-10) 1 Each Capsule      100 MG PO BID, CAP         Reported         7/17/19       Simvastatin (Simvastatin*) 40 Mg Tablet      40 MG PO HS, #30 TAB 0 Refills         Reported         7/17/19       Naproxen Sodium (Aleve) 220 Mg Tablet      220 MG PO PRN, TAB         Reported         10/11/13       Aspirin EC (Aspirin EC) 81 Mg Tabec      81 MG PO QDAY, TAB         Reported         10/11/13       (Vit B Complex)   No Conflict Check      1 TAB PO QDAY         Reported         10/11/13       Multivitamins (Multiple Vitamin) 1 Each Tablet      1 TAB PO QDAY         Reported         10/11/13       (Losartan)   No Conflict Check      1 TAB PO HS         Reported         10/11/13      Medications Reviewed:  Changes made to meds            IMPRESSION/PLAN      Impression      Ms. Martin is a 68-year-old postmenopausal woman seen in clinic today for     history of DCIS.            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 Martin underwent left lumpectomy on 6/13/2019.  This was followed by     adjuvant radiation therapy which completed on 8/26/2019.  She comes into clinic     today to discuss treatment with an aromatase inhibitor.  After long discussion     she is agreeable to try anastrozole for a few weeks.  We discussed the     possibility of joint pain and muscle aches.  We also discussed the possibility     this medication can cause osteoporosis or osteopenia and hot flashes.  The     patient will call the clinic and discussion of medication change if she finds     that her side effects are intolerable over the next couple of weeks.  Otherwise     she will continue the medication and return to clinic to see me in 6 weeks.      Sometime over the next few weeks she will also get a DEXA scan.  She continues     to take daily calcium and vitamin D which I encouraged her to continue along     with daily exercise.            Patient Education      Patient Education Provided:  Yes (medication side effects)            Time Spent Counseling Patient      Total Visit Time:  60      Over 50% Time Counseling Pt:  Yes                 Disclaimer: Converted document may not contain table formatting or lab diagrams. Please see [employer] [facility] for the authenticated document.

## 2021-05-28 NOTE — PROGRESS NOTES
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 Fuentes      2) Adjuvant XRT complete 19 - Dr. Sanderson      3) started adjuvant anastrazole 19            HPI - Oncology Interim      Patient comes in today for routine follow-up.  She says she is having some     general aching of her bones that is worse when she is sitting longer.  When she     gets up and starts moving the pain is completely resolved.  She believes she had    this prior to starting her aromatase inhibitor so she does not think it is     necessarily related.  I told her I would consider switching to a different     aromatase inhibitor if she felt this was the cause of her symptoms.  She still     has difficulty sleeping but as long as she takes a Benadryl before she goes to     bed this issue resolves.  Should follow-up with Dr. Fuentes last September.  At    that time she ordered a repeat ultrasound to evaluate the seroma and associated     necrosis.  The patient got the report that this had not changed so she skipped     her follow-up appointment with Dr. Fuentes.  She ask if I would be willing to     continue her follow-up imaging so that she only has to see Dr. Fuentes once a     year.  She no longer feels safe driving to [place] on her own and has to     request for her daughters to drive her there.  I reviewed " the results of her     recent ultrasound which was done on 2021 and showed no significant change.            Finally the patient asked me about her history of aortic stenosis.  She tells me    that she was recently seen by Dr. Tovar and told that on repeat echo her aortic     stenosis had worsened.  However, the patient reports no significant symptoms.      She is able to walk a good distance and even walk up hills without any shortness    of breath.  She never reports any dizziness.  She is just very concerned because    her father had a history of coronary artery disease and had to have a bypass     surgery which was a big ordeal.  I reassured the patient that coronary bypass     surgery and aortic valve replacement are very different procedures.  I tried to     ease the patient's mind about her diagnosis of aortic stenosis.            Clinical Trial Participant      No            ECOG Performance Status      0            PAST, FAMILY   Past Medical History      Past Medical History:  High Cholesterol, Hypertension      Hematology/Oncology (F):  Breast Cancer            Past Surgical History      Biopsy                        Family History      Family History:  Breast Cancer, Colorectal Cancer, Myeloma            Social History      Lives independently:  No            Tobacco Use      Tobacco status:  Former smoker            Substance Use      Substance use:  Denies use            REVIEW OF SYSTEMS      General:  Admits: Fatigue;          Denies: Appetite Change, Fever, Night Sweats, Weight Gain, Weight Loss      Eye:  Denies Blurred Vision, Denies Corrective Lenses, Denies Diplopia, Denies     Vision Changes      ENT:  Denies Headache, Denies Hearing Loss, Denies Hoarseness, Denies Sore     Throat      Cardiovascular:  Denies Chest Pain, Denies Palpitations      Respiratory:  Denies: Cough, Coughing Blood, Productive Cough, Shortness of Air,    Wheezing      Gastrointestinal:  Denies Bloody Stools,  Denies Constipation, Denies Diarrhea,     Denies Nausea/Vomiting, Denies Problem Swallowing, Denies Unable to Control     Bowels      Genitourinary:  Denies Blood in Urine, Denies Incontinence, Denies Painful     Urination      Musculoskeletal:  Denies Back Pain, Denies Muscle Pain, Denies Painful Joints      Integumentary:  Denies Itching, Denies Lesions, Denies Rash      Neurologic:  Denies Dizziness, Denies Numbness\Tingling, Denies Seizures      Psychiatric:  Denies Anxiety, Denies Depression      Endocrine:  Denies Cold Intolerance, Denies Heat Intolerance      Hematologic/Lymphatic:  Denies Bruising, Denies Bleeding, Denies Enlarged Lymph     Nodes      Reproductive:  Denies: Menopause, Heavy Periods, Pregnant, Still Menstruating            VITAL SIGNS AND SCORES      Vitals      Weight 172 lbs 6.396 oz / 78.2 kg      Temperature 97.4 F / 36.33 C - Temporal      Pulse 60      Blood Pressure 142/57 Sitting, Left Arm      Pulse Oximetry 97%, RM AIR            Pain Score      Experiencing any pain?:  No      Pain Scale Used:  Numerical      Pain Intensity:  0            Fatigue Score      Experiencing any fatigue?:  Yes      Fatigue (0-10 scale):  2            EXAM      General: Alert, cooperative, no acute distress      Eyes: Anicteric sclera, PERRLA      Breast: Deferred exam to the next appointment with mammogram      Respiratory: CTAB, normal respiratory effort      Abdomen: Normal active bowel sounds, no tenderness, no distention      Cardiovascular: RRR, no murmur, no lower extremity edema      Skin: Normal tone, no rash, no lesions      Psychiatric: Appropriate affect, intact judgment      Neurologic: No focal sensory or motor deficits, no weakness, numbness, dizziness      Musculoskeletal: Normal muscle strength and tone      Extremities: No clubbing, cyanosis, or deformities            PREVENTION      Date Influenza Vaccine Given:  Oct 1, 2020      Influenza Vaccine Declined:  No      2 or More Falls in  Past Year?:  No      Fall Past Year with Injury?:  No      Hx Pneumococcal Vaccination:  Yes      Encouraged to follow-up with:  PCP regarding preventative exams.      Chart initiated by      GUILLAUME BRUNNER CMA            ALLERGY/MEDS      Allergies      Coded Allergies:             NO KNOWN DRUG ALLERGIES (Verified  Allergy, Mild, 4/30/21)            Medications      Last Reconciled on 4/30/21 17:29 by MARLA MCNULTY      Anastrozole (Anastrozole) 1 Mg Tablet      1 MG PO QDAY, #90 TAB 3 Refills         Prov: MARLA MCNULTY         10/30/20       Rosuvastatin Calcium (Crestor*) 40 Mg Tablet      40 MG PO HS, #30 TAB 0 Refills         Reported         2/28/20       Icosapent Ethyl (VASCEPA) 1 Gm Capsule      2 GM PO BID for 30 Days, #60 CAP         Reported         2/28/20       Calcium Carbonate/Vitamin D3 (OYSCO 500-VIT D3 200 TABLET) 1 Each Tablet      1 EACH PO QDAY, TABLET         Reported         12/23/19       Carvedilol (Carvedilol) 12.5 Mg Tablet      6.5 MG PO BID, #60 TAB 0 Refills         Prov: MARLA MCNULTY         9/16/19       Gluc Isabel/Chondro Isabel A/Vit C/Mn (Osteo Biflex) 1 Each Tablet      1 TAB PO QDAY, #30 TAB         Reported         7/17/19       Ubidecarenone (Co Q-10) 1 Each Capsule      100 MG PO BID, CAP         Reported         7/17/19       Naproxen Sodium (Aleve) 220 Mg Tablet      220 MG PO PRN, TAB         Reported         10/11/13       Multivitamins (Multiple Vitamin) 1 Each Tablet      1 TAB PO QDAY         Reported         10/11/13      Medications Reviewed:  No Changes made to meds            IMPRESSION/PLAN      Diagnosis      Breast cancer screening - Z12.39            History of breast cancer - Z85.3            Post-menopausal - Z78.0            High risk medication use - Z79.899            Notes      New Diagnostics      * Screening Mammo, 5 Months         Dx: Breast cancer screening - Z12.39      * Bone Densitometry DEXA, 5 Months         Dx: Post-menopausal - Z78.0             Plan      DCIS: Patient is tolerating anastrozole well with the exception of some sleep     disturbance.  She is able to overcome this with Tylenol PM and melatonin.  I     would continue the medication and encouraged the patient to call me if she ever     decides that she would like to switch to a different aromatase inhibitor and see    if her sleep and joint stiffness improves.  I will order repeat mammogram for     this coming September and follow-up with her after.            Breast seroma: Associated with surrounding fat necrosis.  Most recent imaging     was an ultrasound on 2/22/2021 and showed this area was unchanged compared to     the prior September.  Patient was to follow-up with Dr. Fuentes and would like     for me to order her repeat imaging.  I will plan to repeat her screening     mammogram as above in September.  Encouraged the patient to continue to follow-    up with Dr. Fuentes annually.             Aortic stenosis: Patient reports no symptoms but had worsening aortic stenosis     on recent echo.  She is followed by Dr. Tovar.            Patient Education      Patient Education Provided:  Yes            Electronically signed by MARLA MCNULTY  04/30/2021 17:29       Disclaimer: Converted document may not contain table formatting or lab diagrams. Please see [employer] for the authenticated document.

## 2021-05-28 NOTE — PROGRESS NOTES
"Patient: ADIN NIEVES     Acct: [id number]     Report: #[id number]  UNIT #: [id number]     : 1950    Encounter Date:10/29/2019  PRIMARY CARE: SID BRENNER  ***Signed***  --------------------------------------------------------------------------------------------------------------------  NURSE INTAKE      Visit Type      Established Patient Visit            Chief Complaint      BREAST CA            Referring Provider/Copies To      Referring Provider:  FLORINDA LUGO      Primary Care Provider:  DELTA LEE            History and Present Illness      Past Oncology Illness History      1) DCIS: LEFT breast, UIQ, Diagnosed 19; 6 mm, \"low grade\", ER: 100%, SC:     100%; staged pTis N0 M0 stage 0            Treatment History:            1) s/p Left lumpectomy 19      2) Adjuvant XRT complete 19 - Dr. Sanderson      3) started adjuvant anastrazole 19            HPI - Oncology Interim      Ms. Nieves returns today to discuss how she is doing after starting an AI.  She     initially had difficulty with poor sleep but that has improved somewhat over the    last few weeks.  She has had slight hot flashes and is possibly been a little     more irritable but this is all manageable.  She denies any significant bone or     joint pain.  She would like to continue on the medication and see if her sleep     difficulty continues to improve.  She has no other changes in her health or sig    nificant complaints.            Clinical Trial Participant      No            ECOG Performance Status      0            PAST, FAMILY   Past Medical History      Past Medical History:  High Cholesterol, Hypertension      Hematology/Oncology (F):  Breast Cancer (DCIS)            Past Surgical History      Biopsy                        Family History      Family History:  Breast Cancer (AUNT), Colorectal Cancer (MOTHER), Myeloma     (BROTHER)            Social History      Lives independently:  No    "         Tobacco Use      Tobacco status:  Former smoker      Quit status:  Quit date established (1982)            Alcohol Use      Alcohol intake:  OCCASSIONALLY-2/MONTH            Substance Use      Substance use:  Denies use            REVIEW OF SYSTEMS      General:  Denies: Appetite Change, Fatigue, Fever, Night Sweats, Weight Gain,     Weight Loss      Eye:  Denies Blurred Vision, Denies Corrective Lenses, Denies Diplopia, Denies     Vision Changes      ENT:  Denies Headache, Denies Hearing Loss, Denies Hoarseness, Denies Sore     Throat      Cardiovascular:  Denies Chest Pain, Denies Palpitations      Respiratory:  Denies: Coughing Blood, Productive Cough, Shortness of Air,     Wheezing      Gastrointestinal:  Denies Bloody Stools, Denies Constipation, Denies Diarrhea,     Denies Nausea/Vomiting, Denies Problem Swallowing, Denies Unable to Control     Bowels      Genitourinary:  Denies Blood in Urine, Denies Incontinence, Denies Painful     Urination      Musculoskeletal:  Denies Back Pain, Denies Muscle Pain, Denies Painful Joints      Integumentary:  Denies Itching, Denies Lesions, Denies Rash      Neurologic:  Denies Dizziness, Denies Numbness\Tingling, Denies Seizures      Psychiatric:  Denies Anxiety, Denies Depression      Endocrine:  Denies Cold Intolerance, Denies Heat Intolerance      Hematologic/Lymphatic:  Denies Bruising, Denies Bleeding, Denies Enlarged Lymph     Nodes      Reproductive:  Denies: Menopause, Heavy Periods, Pregnant, Still Menstruating            VITAL SIGNS AND SCORES      Vitals      Height 5 ft 4.17 in / 163 cm      Weight 195 lbs 15.823 oz / 88.9 kg      BSA 1.94 m2      BMI 33.5 kg/m2      Temperature 98.0 F / 36.67 C - Temporal      Pulse 83      Blood Pressure 153/77 Sitting, Left Arm      Pulse Oximetry 97%, RM AIR            Pain Score      Experiencing any pain?:  No      Pain Scale Used:  Numerical      Pain Intensity:  0            Fatigue Score      Experiencing any  fatigue?:  No      Fatigue (0-10 scale):  0 (none)            EXAM      General Appearance:  Positive for: Alert, Cooperative, Acute Distress      Eye:  Positive for: PERRLA;          Negative for: Anicteric Sclerae      HEENT:  Positive for: Oropharynx clear      Neck:  Negative for: Masses      Respiratory:  Positive for: CTAB, Normal Respiratory Effort      Abdomen/Gastro:  Positive for: Normal Active Bowel Sounds;          Negative for: Tenderness      Cardiovascular:  Positive for: RRR;          Negative for: Murmur, Peripheral Edema      Skin:  Positive for: Normal Texture and Turgor, Normal Tone;          Negative for: Rash      Psychiatric:  Positive for: Appropriate Affect, Intact Judgement      Neurologic:  Negative for: Dizziness, Headache, Weakness      Musculoskeletal:  Positive for: Full Muscle Strength, Full Muscle Tone      Lower Extremities:  Positive for: Normal Gait and Station;          Negative for: Edema, Weakness      Upper Extremities:  Negative for: Clubbing, Deformities, Digital Cyanosis,     Digital Ischemia            PREVENTION      Hx Influenza Vaccination:  Yes      Date Influenza Vaccine Given:  Oct 1, 2019      Influenza Vaccine Declined:  No      2 or More Falls Past Year?:  No      Fall Past Year with Injury?:  No      Hx Pneumococcal Vaccination:  Yes      Encouraged to follow-up with:  PCP regarding preventative exams.      Chart initiated by      GUILLAUME BRUNNER [facility]            ALLERGY/MEDS      Allergies      Coded Allergies:             NO KNOWN DRUG ALLERGIES (Verified  Allergy, Mild, 10/29/19)            Medications      Last Reconciled on 11/3/19 20:43 by MARLA MCNULTY      Anastrozole (Anastrozole) 1 Mg Tablet      1 MG PO QDAY, #90 TAB 3 Refills         Prov: MARLA MCNULTY         10/29/19       Carvedilol (Carvedilol) 12.5 Mg Tablet      6.5 MG PO BID, #60 TAB 0 Refills         Prov: MARLA MCNULTY         9/16/19       Calcium Carb/Vit D3 (CALCIUM 600-VIT D3 400 TABLET) 1  Each Tablet      1 EACH PO QDAY, #60 TAB 0 Refills         Reported         7/17/19       Gluc Isabel/Chondro Isabel A/Vit C/Mn (Osteo Biflex) 1 Each Tablet      1 TAB PO QDAY, #30 TAB         Reported         7/17/19       Ubidecarenone (Co Q-10) 1 Each Capsule      100 MG PO BID, CAP         Reported         7/17/19       Simvastatin (Simvastatin*) 40 Mg Tablet      40 MG PO HS, #30 TAB 0 Refills         Reported         7/17/19       Naproxen Sodium (Aleve) 220 Mg Tablet      220 MG PO PRN, TAB         Reported         10/11/13       Aspirin EC (Aspirin EC) 81 Mg Tabec      81 MG PO QDAY, TAB         Reported         10/11/13       Multivitamins (Multiple Vitamin) 1 Each Tablet      1 TAB PO QDAY         Reported         10/11/13      Medications Reviewed:  No Changes made to meds            IMPRESSION/PLAN      Impression      68-year-old female with a history of DCIS was started on anastrozole in     September 2019.            Diagnosis      Notes      Renewed Medications      * Anastrozole 1 MG TABLET:         From: 1 MG PO QDAY #30         To: 1 MG PO QDAY #90         Dx: DCIS (ductal carcinoma in situ) - D05.10            Plan      She is tolerating well with the exception of some moderate sleep disturbance and    mild irritability both of which appear to be improving over time.  She would     like to continue on the medication for now and see if it becomes more tolerable     over time.  She had a DEXA scan on 9/19/2019 which showed normal bone density.      I will follow-up with her in 3 months and get routine labs.            Patient Education      Patient Education Provided:  Yes            Electronically signed by MARLA MCNULTY  11/03/2019 20:43       Disclaimer: Converted document may not contain table formatting or lab diagrams. Please see [employer] for the authenticated document.

## 2021-05-28 NOTE — PROGRESS NOTES
"Patient: ADIN NIEVES     Acct: [id number]     Report: #[id number]  UNIT #: [id number]     : 1950    Encounter Date:2019  PRIMARY CARE: SID BRENNER  ***Signed***  --------------------------------------------------------------------------------------------------------------------  NURSE INTAKE      Visit Type      New Patient Visit            Chief Complaint      BREAST CANCER            Referring Provider/Copies To      Referring Provider:  FLORINDA LUGO      Primary Care Provider:  DELTA LEE      Copies To:   FLORINDA LUGO ;            History and Present Illness      Past Oncology Illness History      1) DCIS: LEFT breast, UIQ, Diagnosed 19; 6 mm, \"low grade\", ER: 100%, NM:     100%; staged pTis N0 M0 stage 0            Treatment History:            1) s/p Left lumpectomy 19      2) Adjuvant XRT planned; patient considering adjuvant endocrine-based treatment     (19)            [facility] - Oncology Interim      Ms. Nieves is a 68-year-old female with a history of hypertension and     hyperlipidemia who is seen regarding a new diagnosis of ductal carcinoma in     situ.  She is seen for recommendations regarding medical management.            Ms. Nieves was in her usual state of health when she underwent a screening mamm    ogram on 2019.  This identified an abnormality in the left breast.      She subsequently underwent repeat mammogram, ultrasound, and biopsy on 2019.  The biopsy confirmed a diagnosis of ductal carcinoma in situ.  It was     noted to be \"low-grade\".  It was noted to be estrogen receptor 100%,     progesterone receptor 100%.  She subsequently underwent an MRI of the breasts on    2019.  This only identified the previously identified area of     abnormality.  She subsequently was seen by surgery, and elected to proceed with     lumpectomy which was performed on 2019.  This was notable for the     ductal " carcinoma in situ measuring 6 mm x 5 mm x 2 mm.  Margins were adequate.      She was therefore staged as pTis N0 M0 Stage 0.  She is now referred to medical     oncology for a discussion and consideration of adjuvant endocrine-based therapy.     She reports that she has been seen by radiation oncology, and anticipates     undergoing adjuvant radiation therapy in the near future.            Ms. Martin reports that she is recovering well from her surgery.  She denies     fevers or chills or sweats.  She denies nausea or vomiting.  She denies wound     erythema or exudates.  She does report a history of a maternal aunt with a     history of breast cancer.  She reports that she is G2, P2, and is postmenopausal    as of .  She reports that she was previously on oral contraceptives for     roughly 7 to 10 years she denies headaches or visual changes.  She denies nausea    or vomiting.  She denies palpable masses.  She does complain of asthenia.  She d    escribes as feeling of tiredness and fatigue.  She attributes it to recovery at     this time.  It is currently 7 out of 10 in severity.  No other modifying factors    or associated signs or symptoms.            Clinical Trial Participant      No            ECOG Performance Status      0            PAST, FAMILY   Past Medical History      Past Medical History:  High Cholesterol, Hypertension      Hematology/Oncology (F):  Breast Cancer            Past Surgical History      Biopsy                        Family History      Family History:  Breast Cancer (AUNT), Colorectal Cancer (MOTHER), Myeloma     (BROTHER)            Social History      Lives independently:  No            Tobacco Use      Tobacco status:  Former smoker      Quit status:  Quit date established ()            Alcohol Use      Alcohol intake:  OCCASSIONALLY-2/MONTH            Substance Use      Substance use:  Denies use            REVIEW OF SYSTEMS      General:  Admits: Fatigue, Night  Sweats;          Denies: Appetite Change, Fever, Weight Gain, Weight Loss      Eye:  Denies: Blurred Vision, Corrective Lenses, Diplopia, Eye Irritation, Eye     Pain, Eye Redness, Spots in Vision, Vision Loss      ENT:  Denies: Headache, Hearing Loss, Hoarseness, Seizures, Sinus Congestion,     Sore Throat      Cardiovascular:  Denies: Chest Pain, Edema Ankles, Edema Legs, Irregular     Heartbeat, Palpitations      Respiratory:  Denies: Coughing Blood, Productive Cough, Shortness of Air,     Wheezing      Gastrointestinal:  Denies: Bloody Stools, Constipation, Diarrhea, Frequent     Heartburn, Nausea, Problem Swallowing, Tarry Stools, Unable to Control Bowels,     Vomiting      Genitourinary (female):  Denies: Blood in Urine, Decrease Urine Stream, Frequent    Urination, Incontinence, Painful Urination      Musculoskeletal:  Denies: Back Pain, Leg Cramps, Muscle Pain, Muscle Weakness,     Painful Joints, Swollen Joints      Integumentary:  Denies: Bleeds Easily, Bruises Easily, Hair Changes, Jaundice,     Lesions, Mole Changes, Nail Changes, Pigment Changes, Rash, Skin Discoloration      Neurologic:  Denies: Dizziness, Fainting, Numbness\Tingling, Paralysis, Seizures      Psychiatric:  Denies: Anxiety, Confused, Depression, Disoriented, Memory Loss      Endocrine:  Denies: Cold Intolerance, Diabetes, Excessive Sweating, Excessive     Thirst, Excessive Urination, Heat Intolerance, Flushing, Hyperthyroidism,     Hypothyroidism      Hematologic/Lymphatic:  Denies: Bruising, Bleeding, Enlarged Lymph Nodes,     Recurrent Infections, Transfusions      Reproductive:  Denies: Menopause, Heavy Periods, Pregnant, Still Menstruating            VITAL SIGNS AND SCORES      Vitals      Height 5 ft 4.17 in / 163 cm      Weight 195 lbs 8.768 oz / 88.7 kg      BSA 1.94 m2      BMI 33.4 kg/m2      Temperature 98.0 F / 36.67 C - Temporal      Pulse 82      Blood Pressure 156/82 Sitting, Left Arm      Pulse Oximetry 98%, ROOM AIR             Pain Score      Experiencing any pain?:  No      Pain Scale Used:  Numerical      Pain Intensity:  0            Fatigue Score      Experiencing any fatigue?:  Yes      Fatigue (0-10 scale):  7            EXAM      General Appearance:  Positive for: Alert, Oriented x3      Eye:  Positive for: Anicteric Sclerae, PERRLA      HEENT:  Negative for: Scleral Icterus, Thrush      Neck:  Positive for: Supple      Respiratory:  Negative for: Rales, Rhochi      Abdomen/Gastro:  Positive for: Soft;          Negative for: Hepatosplenomegaly      Cardiovascular:  Positive for: RRR;          Negative for: Murmur, Rub      Skin:  Negative for: Induration, Lesions      Psychiatric:  Positive for: AAO X 3, Appropriate Affect      Lower Extremities:  Negative for: Edema            PREVENTION      Hx Influenza Vaccination:  Yes      Date Influenza Vaccine Given:  Oct 1, 2018      Influenza Vaccine Declined:  No      2 or More Falls Past Year?:  No      Fall Past Year with Injury?:  No      Hx Pneumococcal Vaccination:  Yes      Encouraged to follow-up with:  PCP regarding preventative exams.      Chart initiated by      ALBINO HANSON CMA            ALLERGY/MEDS      Allergies      Coded Allergies:             NO KNOWN DRUG ALLERGIES (Verified  Allergy, Mild, 7/17/19)            Medications            Calcium Carb/Vit D3 (CALCIUM 600-VIT D3 400 TABLET) 1 Each Tablet      1 EACH PO QDAY, #60 TAB 0 Refills         Reported         7/17/19       Gluc Isabel/Chondro Isabel A/Vit C/Mn (Osteo Biflex) 1 Each Tablet      1 TAB PO QDAY, #30 TAB         Reported         7/17/19       Ubidecarenone (Co Q-10) 1 Each Capsule      100 MG PO BID, CAP         Reported         7/17/19       Carvedilol (Carvedilol) 12.5 Mg Tablet      MG PO BID, #60 TAB 0 Refills         Reported         7/17/19       Simvastatin (Simvastatin*) 40 Mg Tablet      40 MG PO HS, #30 TAB 0 Refills         Reported         7/17/19       Naproxen Sodium (Aleve) 220 Mg Tablet       220 MG PO PRN, TAB         Reported         10/11/13       Aspirin EC (Aspirin EC) 81 Mg Tabec      81 MG PO QDAY, TAB         Reported         10/11/13       (Vit B Complex)   No Conflict Check      1 TAB PO QDAY         Reported         10/11/13       Multivitamins (Multiple Vitamin) 1 Each Tablet      1 TAB PO QDAY         Reported         10/11/13       (Losartan)   No Conflict Check      1 TAB PO HS         Reported         10/11/13      Medications Reviewed:  Changes made to meds            IMPRESSION/PLAN      Impression      1) DCIS: Ms. Martin is a 68-year-old postmenopausal woman with a history of     ductal carcinoma in situ.  I had an extensive discussion today with her     regarding management.  I reviewed with her the NCCN guidelines, which would     recommend for estrogen receptor positive women following lumpectomy either SERM     or AI therapy consideration for endocrine-based therapy as part of adjuvant the    rapy as a category 1 recommendation.  I did review with her the options of     either aromatase inhibitor therapy or tamoxifen.  I reviewed the risks and     benefits with each approach. In NSABP B-24, the rough estimate of absolute     improvement in outcome was 7% in the ipsilateral breast and 2% in the     contralateral breast.  She indicates understanding of this.  She does express a     strong disinterest and adjuvant therapy secondary to concerns regarding side     effects and the limited benefit.  Again, I reviewed with her that it is     considered standard of care to proceed with treatment, and certainly she could     discontinue therapy if she were to develop any significant symptoms or side     effects.  She reports that she will consider this option in the interim.  She     will first proceed with adjuvant radiation therapy.  I gave her information     regarding aromatase inhibitor therapy.  I will see her back in velocity     radiation therapy to further discuss her decisions  regarding adjuvant therapy.      She indicates understanding and is amenable to this plan.            Diagnosis      DCIS (ductal carcinoma in situ)         Ductal carcinoma in situ (DCIS) of left breast - D05.12         Laterality: left            Notes      New Medications      * Simvastatin (Simvastatin*) 40 MG TABLET: 40 MG PO HS #30      * Carvedilol 12.5 MG TABLET: MG PO BID #60      * Ubidecarenone (Co Q-10) 1 EACH CAPSULE: 100 MG PO BID      * Gluc Isabel/Chondro Isabel A/Vit C/Mn (Osteo Biflex) 1 EACH TABLET: 1 TAB PO QDAY #30      * Calcium Carb/Vit D3 (CALCIUM 600-VIT D3 400 TABLET) 1 EACH TABLET: 1 EACH PO       QDAY #60            Plan      1) Information given for Femara; patient to review            2) f/u with XRT for adjuvant treatment            3) RTC last day of XRT to make final decision regarding adjuvant AI therapy            The total time of the visit was 50 minutes.  Over 50% of that time was spent in     face-to-face counseling reviewing the diagnosis, adjuvant therapy options, risks     and benefits associated with adjuvant treatment, and coordination of care.            Patient Education      Patient Education Provided:  Yes                 Disclaimer: Converted document may not contain table formatting or lab diagrams. Please see [employer] for the authenticated document.

## 2021-05-28 NOTE — PROGRESS NOTES
"Patient: ADIN NIEVES     Acct: [id number]     Report: #[id number]  UNIT #: [id number]     : 1950    Encounter Date:2020  PRIMARY CARE: SID BRENNER  ***Signed***  --------------------------------------------------------------------------------------------------------------------  NURSE INTAKE      Visit Type      Established Patient Visit            Chief Complaint      BREAST CANCER            Referring Provider/Copies To      Referring Provider:  FLORINDA FUENTES      Primary Care Provider:  DELTA LEE      Copies To:   DELTA LEE            History and Present Illness      Past Oncology Illness History      1) DCIS: LEFT breast, UIQ, Diagnosed 19; 6 mm, \"low grade\", ER: 100%, [place]:     100%; staged pTis N0 M0 stage 0            Treatment History:            1) s/p Left lumpectomy 19      2) Adjuvant XRT complete 19 - Dr. Sanderson      3) started adjuvant anastrazole 19            HPI - Oncology Interim      Ms. Nieves returns for her 6-month follow-up today.  She also has follow-up with    her surgeon Dr. Fuentes in April.  Dr. Dieudonne Fuentes has a repeat mammogram     ordered for late March.  The patient continues on anastrozole.  She continues to    have sleep issues but says this is improved with Tylenol PM.  She does not have     any significant hot flashes and otherwise is tolerating medication well.            Clinical Trial Participant      No            ECOG Performance Status      0            PAST, FAMILY   Past Medical History      Past Medical History:  High Cholesterol, Hypertension      Hematology/Oncology (F):  Breast Cancer (DCIS)            Past Surgical History      Biopsy                        Family History      Family History:  Breast Cancer (AUNT), Colorectal Cancer (MOTHER), Myeloma     (BROTHER)            Social History      Lives independently:  No            Tobacco Use      Tobacco status:  Former smoker      Quit status:  " Quit date established (1982)            Alcohol Use      Alcohol intake:  OCCASSIONALLY-2/MONTH            Substance Use      Substance use:  Denies use            REVIEW OF SYSTEMS      General:  Denies: Appetite Change, Fatigue, Fever, Night Sweats, Weight Gain,     Weight Loss      Eye:  Denies Blurred Vision, Denies Corrective Lenses, Denies Diplopia, Denies     Vision Changes      ENT:  Denies Headache, Denies Hearing Loss, Denies Hoarseness, Denies Sore     Throat      Cardiovascular:  Denies Chest Pain, Denies Palpitations      Respiratory:  Denies: Coughing Blood, Productive Cough, Shortness of Air,     Wheezing      Gastrointestinal:  Denies Bloody Stools, Denies Constipation, Denies Diarrhea,     Denies Nausea/Vomiting, Denies Problem Swallowing, Denies Unable to Control     Bowels      Genitourinary:  Denies Blood in Urine, Denies Incontinence, Denies Painful     Urination      Musculoskeletal:  Denies Back Pain, Denies Muscle Pain, Denies Painful Joints      Integumentary:  Denies Itching, Denies Lesions, Denies Rash      Neurologic:  Denies Dizziness, Denies Numbness\Tingling, Denies Seizures      Psychiatric:  Denies Anxiety, Denies Depression      Endocrine:  Denies Cold Intolerance, Denies Heat Intolerance      Hematologic/Lymphatic:  Denies Bruising, Denies Bleeding, Denies Enlarged Lymph     Nodes      Reproductive:  Denies: Menopause, Heavy Periods, Pregnant, Still Menstruating            VITAL SIGNS AND SCORES      Vitals      Weight 194 lbs 0.077 oz / 88.0 kg      Temperature 97.1 F / 36.17 C - Temporal      Pulse 76      Respirations 20      Blood Pressure 159/68 Sitting, Right Arm      Pulse Oximetry 100%, ROOM AIR            Pain Score      Experiencing any pain?:  No      Pain Scale Used:  Numerical      Pain Intensity:  0            Fatigue Score      Experiencing any fatigue?:  No      Fatigue (0-10 scale):  0 (none)            EXAM      General: Alert, cooperative, no acute distress       Eyes: Anicteric sclera, PERRLA      HEENT: Oropharynx clear, no exudates      Respiratory: CTAB, normal respiratory effort      Abdomen: Normal active bowel sounds, no tenderness, no distention      Cardiovascular: RRR, no murmur, no peripheral edema      Skin: Normal tone, no rash, no lesions      Psychiatric: Appropriate affect, intact judgment      Neurologic: No focal sensory or motor deficits, no weakness, numbness, dizziness      Musculoskeletal: Normal muscle strength, normal muscle tone      Extremities: No clubbing, cyanosis, or deformities            PREVENTION      Hx Influenza Vaccination:  Yes      Date Influenza Vaccine Given:  Oct 1, 2019      Influenza Vaccine Declined:  No      2 or More Falls Past Year?:  No      Fall Past Year with Injury?:  No      Hx Pneumococcal Vaccination:  Yes      Encouraged to follow-up with:  PCP regarding preventative exams.      Chart initiated by      NELSON OLIVEIRA CMA            ALLERGY/MEDS      Allergies      Coded Allergies:             NO KNOWN DRUG ALLERGIES (Verified  Allergy, Mild, 2/28/20)            Medications      Last Reconciled on 3/5/20 23:28 by MARLA MCNULTY      Rosuvastatin Calcium (Crestor*) 40 Mg Tablet      40 MG PO HS, #30 TAB 0 Refills         Reported         2/28/20       Icosapent Ethyl (VASCEPA) 1 Gm Capsule      2 GM PO BID for 30 Days, #60 CAP         Reported         2/28/20       Calcium Carbonate/Vitamin D3 (OYSCO 500-VIT D3 200 TABLET) 1 Each Tablet      1 EACH PO QDAY, TABLET         Reported         12/23/19       Anastrozole (Anastrozole) 1 Mg Tablet      1 MG PO QDAY, #90 TAB 3 Refills         Prov: MARLA MCNULTY         10/29/19       Carvedilol (Carvedilol) 12.5 Mg Tablet      6.5 MG PO BID, #60 TAB 0 Refills         Prov: MARLA MCNULTY         9/16/19       Gluc Isabel/Chondro Isabel A/Vit C/Mn (Osteo Biflex) 1 Each Tablet      1 TAB PO QDAY, #30 TAB         Reported         7/17/19       Ubidecarenone (Co Q-10) 1 Each Capsule       100 MG PO BID, CAP         Reported         7/17/19       Naproxen Sodium (Aleve) 220 Mg Tablet      220 MG PO PRN, TAB         Reported         10/11/13       Multivitamins (Multiple Vitamin) 1 Each Tablet      1 TAB PO QDAY         Reported         10/11/13      Medications Reviewed:  Changes made to meds            IMPRESSION/PLAN      Impression      69-year-old female with a history of DCIS            Diagnosis      Notes      New Medications      * Icosapent Ethyl (VASCEPA) 1 GM CAPSULE: 2 GM PO BID 30 Days #60         Instructions: Take with meals.      * Rosuvastatin Calcium (Crestor*) 40 MG TABLET: 40 MG PO HS #30            Plan      DCIS: Patient is tolerating anastrozole well.  Her repeat mammogram is already     scheduled for the end of March and she will follow-up with Dr. Fuentes, her     surgeon, in April.  I discussed with the patient the option of alternating     appointments every 6 months with Dr. Fuentes.  She is agreeable to this.  So     the next follow-up appointment with the patient will be in October.            Patient Education      Patient Education Provided:  Yes            Electronically signed by MARLA MCNULTY  03/05/2020 23:28       Disclaimer: Converted document may not contain table formatting or lab diagrams. Please see [employer] for the authenticated document.

## 2021-08-03 ENCOUNTER — TELEPHONE (OUTPATIENT)
Dept: ONCOLOGY | Facility: HOSPITAL | Age: 71
End: 2021-08-03

## 2021-08-03 NOTE — TELEPHONE ENCOUNTER
Caller: ADIN    Relationship: PATIENT    Best call back number: [phone]     What medication are you requesting: SOMETHING IN PLACE OF ANAZSTROZOLE    What are your current symptoms: STOMACH ISSUES     How long have you been experiencing symptoms: A WHILE    Have you had these symptoms before:    [x] Yes  [] No    Have you been treated for these symptoms before:   [x] Yes  [] No    If a prescription is needed, what is your preferred pharmacy and phone number:    [facility] [address] [place] [address] - [address] [place] [address] [phone] [facility] [phone]

## 2021-08-05 NOTE — TELEPHONE ENCOUNTER
Patient reports that something is causing stomach issues :nausea, upset stomach. She reports she stopped taking the Anastrozole 2 days ago, and it got better at first, but symptoms came back. Advised hat since she has been on Anastrozole for over 2 years, it is unlikely to be the cause, but she can stop taking it for 2 weeks. If symptoms go away completely, she can call back and we will discuss changing medication. If symptoms continue, then she can resume Anastrozole and should follow up with PCP for further evaluation. Patient voices understanding of plan.

## 2021-09-20 ENCOUNTER — HOSPITAL ENCOUNTER (OUTPATIENT)
Dept: BONE DENSITY | Facility: HOSPITAL | Age: 71
Discharge: HOME | End: 2021-09-20

## 2021-09-20 ENCOUNTER — HOSPITAL ENCOUNTER (OUTPATIENT)
Dept: MAMMOGRAPHY | Facility: HOSPITAL | Age: 71
Discharge: HOME | End: 2021-09-20

## 2021-09-20 DIAGNOSIS — Z78.0 POST-MENOPAUSAL: ICD-10-CM

## 2021-09-20 DIAGNOSIS — Z12.31 VISIT FOR SCREENING MAMMOGRAM: ICD-10-CM

## 2021-09-20 PROCEDURE — 77067 SCR MAMMO BI INCL CAD: CPT

## 2021-09-20 PROCEDURE — 77080 DXA BONE DENSITY AXIAL: CPT

## 2021-09-20 PROCEDURE — 77063 BREAST TOMOSYNTHESIS BI: CPT

## 2021-09-22 ENCOUNTER — TELEPHONE (OUTPATIENT)
Dept: ONCOLOGY | Facility: CLINIC | Age: 71
End: 2021-09-22

## 2021-09-22 DIAGNOSIS — D05.12 BREAST NEOPLASM, TIS (DCIS), LEFT: Primary | ICD-10-CM

## 2021-09-22 DIAGNOSIS — R92.8 ABNORMAL MAMMOGRAM OF RIGHT BREAST: ICD-10-CM

## 2021-09-22 NOTE — TELEPHONE ENCOUNTER
Caller: ADIN    Relationship to patient: SELF    Best call back number: [phone]    Patient is needing: TO REQUEST CALL FROM DR. MCNULTY'S NURSE. SHE STILL HAS ISSUES TO DISCUSS.

## 2021-09-23 ENCOUNTER — TELEPHONE (OUTPATIENT)
Dept: ONCOLOGY | Facility: HOSPITAL | Age: 71
End: 2021-09-23

## 2021-09-23 NOTE — TELEPHONE ENCOUNTER
pt called back today. pt had tried to return a call to Jazmin yesterday w/o success.pt stopped Anastrozole 3 weeks ago due to diarrhea.  pt restarted the Anastrozole and it still caused severe diarrhea. pt stopped Anastrozole again. pt is c/o severely aggravated hemorrhoids. pt is requesting a Hydrocortisone Suppository for severely agrivated hemmorroids. pt states that she has tried everything OTC and they are not working. pt states that a MD in the past had prescribed the Hydrocortisone suppositories for her and they worked.

## 2021-09-23 NOTE — TELEPHONE ENCOUNTER
Patient reports she developed diarrhea last month with Anastrozole. She stopped taking and the diarrhea went away. She restarted the Anastrozole and diarrhea came back. Diarrhea has aggravated her hemorhoids. Advised to stop Anastrozole and not restart. We will discuss new plan at next visit on 10/5. Dr. Sharma advised to see PCP or Dr. Novoa (who prescribed suppositories before to treat hemorrhoids)  For medication for hemorrhoids. Patient expresses concern about going into a doctor's office right now, but she will reach out to them and see if they will prescibe.

## 2021-09-30 ENCOUNTER — HOSPITAL ENCOUNTER (OUTPATIENT)
Dept: ULTRASOUND IMAGING | Facility: HOSPITAL | Age: 71
Discharge: HOME | End: 2021-09-30

## 2021-09-30 ENCOUNTER — HOSPITAL ENCOUNTER (OUTPATIENT)
Dept: MAMMOGRAPHY | Facility: HOSPITAL | Age: 71
Discharge: HOME | End: 2021-09-30

## 2021-09-30 DIAGNOSIS — D05.12 BREAST NEOPLASM, TIS (DCIS), LEFT: ICD-10-CM

## 2021-09-30 DIAGNOSIS — R92.8 ABNORMAL MAMMOGRAM OF RIGHT BREAST: ICD-10-CM

## 2021-09-30 PROCEDURE — 76642 ULTRASOUND BREAST LIMITED: CPT

## 2021-10-05 ENCOUNTER — OFFICE VISIT (OUTPATIENT)
Dept: ONCOLOGY | Facility: HOSPITAL | Age: 71
End: 2021-10-05

## 2021-10-05 VITALS
RESPIRATION RATE: 18 BRPM | DIASTOLIC BLOOD PRESSURE: 66 MMHG | WEIGHT: 182.98 LBS | BODY MASS INDEX: 30.45 KG/M2 | TEMPERATURE: 97.6 F | OXYGEN SATURATION: 97 % | SYSTOLIC BLOOD PRESSURE: 171 MMHG | HEART RATE: 77 BPM

## 2021-10-05 DIAGNOSIS — D05.12 BREAST NEOPLASM, TIS (DCIS), LEFT: Primary | ICD-10-CM

## 2021-10-05 DIAGNOSIS — R11.0 NAUSEA: ICD-10-CM

## 2021-10-05 PROBLEM — I10 HIGH BLOOD PRESSURE: Status: ACTIVE | Noted: 2021-10-05

## 2021-10-05 PROCEDURE — G0463 HOSPITAL OUTPT CLINIC VISIT: HCPCS | Performed by: INTERNAL MEDICINE

## 2021-10-05 PROCEDURE — 99214 OFFICE O/P EST MOD 30 MIN: CPT | Performed by: INTERNAL MEDICINE

## 2021-10-05 RX ORDER — ONDANSETRON HYDROCHLORIDE 8 MG/1
8 TABLET, FILM COATED ORAL EVERY 8 HOURS PRN
Qty: 30 TABLET | Refills: 2 | Status: SHIPPED | OUTPATIENT
Start: 2021-10-05 | End: 2022-05-12

## 2021-10-05 NOTE — PROGRESS NOTES
"Patient  Melina Martin    [facility] HEMATOLOGY & ONCOLOGY    Chief Complaint  Follow-up and Breast Cancer    Referring Provider: CARLOS Platt  PCP: Maria Ines Nguyen APRN    Subjective          Oncology/Hematology History Overview Note     DCIS:     LEFT breast, UIQ, Diagnosed 4/22/19; 6 mm, \"low grade\", ER: 100%, CT: 100%; staged pTis N0 M0 stage 0      - Left lumpectomy 6/13/19 -Dr. Fuentes      - Adjuvant XRT complete 8/26/19 - Dr. Sanderson      - started adjuvant anastrazole 9/16/19             History of Present Illness  Patient comes in today for routine follow-up while on anastrozole for her history of DCIS.  Since I saw her last she contacted clinic to say that she developed significant nausea and diarrhea.  She is seeing the nurse practitioner gastroenterology.  Based on the recommendation after soft medication at the time of the phone call.  She has been off of it now for 3 to 4 weeks and says the nausea and diarrhea has not improved.  She has started on a rectal steroid at the recommendation of GI.  She'll continue to follow-up with him as it has not helped.  Of note, her difficulty sleeping has also not improved since being off the medication.  She will restart the medication today and contact me if she has any worsening of symptoms.    She recently underwent screening mammogram and was called back for more detailed ultrasound.  This ultimately found she had a small benign cyst.  She also had a bone density test on 9/20/2021 which was normal.    US Breast Right Limited    Result Date: 9/30/2021  PROCEDURE: US BREAST RIGHT LIMITED  COMPARISON: [facility], DIG SCREENING BILAT ANGELICA W 3D JACK, 2/15/2018, 12:51.  [facility], DIGITAL SCREENING W/CAD, 1/25/2017, 13:41.  [facility], DIG SCREENING BILAT ANGELICA W 3D JACK, 3/26/2019, 14:23.  [facility], DIG SCREENING BILAT ANGELICA W 3D JACK, 9/10/2020, 17:55.  " [facility], , MAMMO SCREENING DIGITAL TOMOSYNTHESIS BILATERAL W CAD, 9/20/2021, 15:19.  INDICATIONS: 7-year-old woman called back from screening mammogram for a 7 mm oval, partially circumscribed mass in the outer central right breast.  Personal history of left breast cancer  TECHNIQUE: A targeted breast ultrasound was performed, with evaluation focusing only on specific areas of concern.  FINDINGS:  Corresponding to the right breast mass on mammogram, there is a 7 x 3 x 5 mm oval, circumscribed, anechoic mass at 09:00, 4 cm from the nipple, consistent with a benign cyst.  CONCLUSION:    Benign right breast cyst.  Recommend annual screening mammography.  I discussed results with the patient following the exam.   RECOMMENDATION(S):  ROUTINE MAMMOGRAM AND CLINICAL EVALUATION IN 12 MONTHS.   BIRADS:  DIAGNOSTIC CATEGORY 2--BENIGN FINDING   PLEASE NOTE:  THE AMERICAN CANCER SOCIETY NOW RECOMMENDS THAT ALL WOMEN WITH >20% LIFETIME RISK OF BREAST CANCER UNDERGO ANNUAL SCREENING BREAST MRI AND WOMEN WITH 15-20% SPEAK WITH THEIR DOCTORS ABOUT ANNUAL SCREENING BREAST MRI.  A NORMAL ULTRASOUND EXAMINATION DOES NOT EXCLUDE THE POSSIBILITY OF BREAST CANCER.  A CLINICALLY SUSPICIOUS PALPABLE LUMP SHOULD BE BIOPSIED.     ADIN ACEVEDO MD       Electronically Signed and Approved By: ADIN ACEVEDO MD on 9/30/2021 at 11:00                 Review of Systems   Constitutional: Negative for appetite change, diaphoresis, fatigue, fever, unexpected weight gain and unexpected weight loss.   HENT: Negative for hearing loss, sore throat, swollen glands, trouble swallowing and voice change.    Eyes: Negative for blurred vision.   Respiratory: Negative for cough, shortness of breath and wheezing.    Cardiovascular: Negative for chest pain and palpitations.   Gastrointestinal: Positive for nausea and rectal pain (hemorrhoids). Negative for blood in stool, constipation, diarrhea and vomiting.   Endocrine: Negative for cold  intolerance and heat intolerance.   Genitourinary: Negative for difficulty urinating and urinary incontinence.   Musculoskeletal: Negative for arthralgias, back pain and myalgias.   Skin: Negative for rash, skin lesions and bruise.   Neurological: Negative for dizziness, seizures, numbness and headache.   Hematological: Does not bruise/bleed easily.   Psychiatric/Behavioral: Negative for depressed mood. The patient is not nervous/anxious.        Past Medical History:   Diagnosis Date   • Anxiety    • Cancer (HCC)     LEFT   • Cataract    • GERD (gastroesophageal reflux disease)    • Heart murmur    • HTN (hypertension)    • Hyperlipidemia    • Sinus infection 2019    PT PRESCRIBED ANTIBIOTIC FINISHED 2019      Past Surgical History:   Procedure Laterality Date   • BREAST BIOPSY Left     MALIGNANT(LOW GRADE DUCTAL CARCINOMA)   • BREAST LUMPECTOMY Left 2019    Procedure: Left breast needle localized lumpectomy,;  Surgeon: Beatriz Fuentes MD;  [place] [facility] [place] [facility] [place]  Service: General   •  SECTION      X 1   • COLONOSCOPY     • HAND SURGERY Right    • OVARIAN CYST REMOVAL       Social History     Socioeconomic History   • Marital status:      Spouse name: Not on file   • Number of children: Not on file   • Years of education: Not on file   • Highest education level: Not on file   Tobacco Use   • Smoking status: Former Smoker     Packs/day: 0.25     Years: 16.00     Pack years: 4.00     Types: Cigarettes     Quit date:      Years since quittin.7   • Smokeless tobacco: Never Used   Substance and Sexual Activity   • Alcohol use: Yes     Comment: 1-2 DRINKS MONTHLY    • Drug use: No   • Sexual activity: Defer     Family History   Problem Relation Age of Onset   • Colon cancer Mother 89   • Hearing loss Mother    • Hypertension Mother    • Hearing loss Father    • Heart attack Father    • Diabetes Brother    • Heart attack Brother    • Depression Daughter    • Breast cancer  Maternal Aunt         UNKNOWN AGE    • Cancer Cousin         FEMALE CANCER OF UNKNOWN ORGIN 60S   • Malig Hyperthermia Neg Hx        Objective   Physical Exam  General: Alert, cooperative, no acute distress, well appearing  Eyes: Anicteric sclera, PERRLA  Respiratory: normal respiratory effort  Cardiovascular: no lower extremity edema  Skin: Normal tone, no rash, no lesions  Psychiatric: Appropriate affect, intact judgment  Neurologic: No focal sensory or motor deficits, normal cognition   Musculoskeletal: Normal muscle strength and tone  Extremities: No clubbing, cyanosis, or deformities    Vitals:    10/05/21 1252   BP: 171/66   Pulse: 77   Resp: 18   Temp: 97.6 °F (36.4 °C)   SpO2: 97%   Weight: 83 kg (182 lb 15.7 oz)   PainSc: 0-No pain     ECOG score: 0         PHQ-9 Total Score: 0       Result Review :   The following data was reviewed by: Ila Sharma MD PhD on 10/05/2021:  Lab Results   Component Value Date    HGB 15.2 05/20/2019    HCT 46.9 (H) 05/20/2019    MCV 86.2 05/20/2019     05/20/2019    WBC 6.72 05/20/2019     Lab Results   Component Value Date    GLUCOSE 204 (H) 05/20/2019    BUN 11 05/20/2019    CREATININE 0.72 05/20/2019     05/20/2019    K 4.6 05/20/2019    CL 99 05/20/2019    CO2 28.5 05/20/2019    CALCIUM 9.1 05/20/2019          Assessment and Plan    Diagnoses and all orders for this visit:    1. Breast neoplasm, Tis (DCIS), left (Primary)    2. Nausea  -     ondansetron (ZOFRAN) 8 MG tablet; Take 1 tablet by mouth Every 8 (Eight) Hours As Needed for Nausea or Vomiting.  Dispense: 30 tablet; Refill: 2    DCIS: Patient has been on anastrozole for 2 years.  She recently held the medication for few weeks, thinking it may be causing her nausea, diarrhea, and difficulty sleeping.  None of these issues resolve off the medication so she'll restart it today.  She continues to see Dr. Fuentes.  Recent mammogram and DEXA scan were normal.    Nausea and diarrhea: She is currently  being evaluated by gastroenterology and will talk with Dr. Novoa soon about these issues.  I will send a prescription for Zofran to help alleviate the nausea.       Patient was given instructions and counseling regarding her condition or for health maintenance advice. Please see specific information pulled into the AVS if appropriate.     Ila Sharma MD PhD    10/5/2021

## 2021-11-03 PROBLEM — I35.0 NONRHEUMATIC AORTIC VALVE STENOSIS: Status: ACTIVE | Noted: 2021-11-03

## 2021-11-04 ENCOUNTER — OFFICE VISIT (OUTPATIENT)
Dept: CARDIOLOGY | Facility: CLINIC | Age: 71
End: 2021-11-04

## 2021-11-04 VITALS
SYSTOLIC BLOOD PRESSURE: 154 MMHG | BODY MASS INDEX: 30.66 KG/M2 | HEIGHT: 65 IN | DIASTOLIC BLOOD PRESSURE: 70 MMHG | HEART RATE: 67 BPM | WEIGHT: 184 LBS

## 2021-11-04 DIAGNOSIS — I35.0 NONRHEUMATIC AORTIC VALVE STENOSIS: Primary | ICD-10-CM

## 2021-11-04 DIAGNOSIS — I10 PRIMARY HYPERTENSION: ICD-10-CM

## 2021-11-04 DIAGNOSIS — E78.5 HYPERLIPIDEMIA LDL GOAL <100: ICD-10-CM

## 2021-11-04 PROCEDURE — 99214 OFFICE O/P EST MOD 30 MIN: CPT | Performed by: INTERNAL MEDICINE

## 2021-11-04 NOTE — ASSESSMENT & PLAN NOTE
Patient with severe asymptomatic aortic stenosis still with good functional capacity recommend repeating echocardiogram in 6 months went over symptoms with patient instructed her to give us a call should she develop any change in her exercise capacity between now and next visit

## 2021-11-04 NOTE — PATIENT INSTRUCTIONS
"Low-Sodium Eating Plan  Sodium, which is an element that makes up salt, helps you maintain a healthy balance of fluids in your body. Too much sodium can increase your blood pressure and cause fluid and waste to be held in your body.  Your health care provider or dietitian may recommend following this plan if you have high blood pressure (hypertension), kidney disease, liver disease, or heart failure. Eating less sodium can help lower your blood pressure, reduce swelling, and protect your heart, liver, and kidneys.  What are tips for following this plan?  Reading food labels  · The Nutrition Facts label lists the amount of sodium in one serving of the food. If you eat more than one serving, you must multiply the listed amount of sodium by the number of servings.  · Choose foods with less than 140 mg of sodium per serving.  · Avoid foods with 300 mg of sodium or more per serving.  Shopping    · Look for lower-sodium products, often labeled as \"low-sodium\" or \"no salt added.\"  · Always check the sodium content, even if foods are labeled as \"unsalted\" or \"no salt added.\"  · Buy fresh foods.  ? Avoid canned foods and pre-made or frozen meals.  ? Avoid canned, cured, or processed meats.  · Buy breads that have less than 80 mg of sodium per slice.    Cooking    · Eat more home-cooked food and less restaurant, buffet, and fast food.  · Avoid adding salt when cooking. Use salt-free seasonings or herbs instead of table salt or sea salt. Check with your health care provider or pharmacist before using salt substitutes.  · Cook with plant-based oils, such as canola, sunflower, or olive oil.    Meal planning  · When eating at a restaurant, ask that your food be prepared with less salt or no salt, if possible. Avoid dishes labeled as brined, pickled, cured, smoked, or made with soy sauce, miso, or teriyaki sauce.  · Avoid foods that contain MSG (monosodium glutamate). MSG is sometimes added to Chinese food, bouillon, and some " "canned foods.  · Make meals that can be grilled, baked, poached, roasted, or steamed. These are generally made with less sodium.  General information  Most people on this plan should limit their sodium intake to 1,500-2,000 mg (milligrams) of sodium each day.  What foods should I eat?  Fruits  Fresh, frozen, or canned fruit. Fruit juice.  Vegetables  Fresh or frozen vegetables. \"No salt added\" canned vegetables. \"No salt added\" tomato sauce and paste. Low-sodium or reduced-sodium tomato and vegetable juice.  Grains  Low-sodium cereals, including oats, puffed wheat and rice, and shredded wheat. Low-sodium crackers. Unsalted rice. Unsalted pasta. Low-sodium bread. Whole-grain breads and whole-grain pasta.  Meats and other proteins  Fresh or frozen (no salt added) meat, poultry, seafood, and fish. Low-sodium canned tuna and salmon. Unsalted nuts. Dried peas, beans, and lentils without added salt. Unsalted canned beans. Eggs. Unsalted nut butters.  Dairy  Milk. Soy milk. Cheese that is naturally low in sodium, such as ricotta cheese, fresh mozzarella, or Swiss cheese. Low-sodium or reduced-sodium cheese. Cream cheese. Yogurt.  Seasonings and condiments  Fresh and dried herbs and spices. Salt-free seasonings. Low-sodium mustard and ketchup. Sodium-free salad dressing. Sodium-free light mayonnaise. Fresh or refrigerated horseradish. Lemon juice. Vinegar.  Other foods  Homemade, reduced-sodium, or low-sodium soups. Unsalted popcorn and pretzels. Low-salt or salt-free chips.  The items listed above may not be a complete list of foods and beverages you can eat. Contact a dietitian for more information.  What foods should I avoid?  Vegetables  Sauerkraut, pickled vegetables, and relishes. Olives. French fries. Onion rings. Regular canned vegetables (not low-sodium or reduced-sodium). Regular canned tomato sauce and paste (not low-sodium or reduced-sodium). Regular tomato and vegetable juice (not low-sodium or reduced-sodium). " Frozen vegetables in sauces.  Grains  Instant hot cereals. Bread stuffing, pancake, and biscuit mixes. Croutons. Seasoned rice or pasta mixes. Noodle soup cups. Boxed or frozen macaroni and cheese. Regular salted crackers. Self-rising flour.  Meats and other proteins  Meat or fish that is salted, canned, smoked, spiced, or pickled. Precooked or cured meat, such as sausages or meat loaves. Solomon. Ham. Pepperoni. Hot dogs. Corned beef. Chipped beef. Salt pork. Jerky. Pickled herring. Anchovies and sardines. Regular canned tuna. Salted nuts.  Dairy  Processed cheese and cheese spreads. Hard cheeses. Cheese curds. Blue cheese. Feta cheese. String cheese. Regular cottage cheese. Buttermilk. Canned milk.  Fats and oils  Salted butter. Regular margarine. Ghee. Solomon fat.  Seasonings and condiments  Onion salt, garlic salt, seasoned salt, table salt, and sea salt. Canned and packaged gravies. Worcestershire sauce. Tartar sauce. Barbecue sauce. Teriyaki sauce. Soy sauce, including reduced-sodium. Steak sauce. Fish sauce. Oyster sauce. Cocktail sauce. Horseradish that you find on the shelf. Regular ketchup and mustard. Meat flavorings and tenderizers. Bouillon cubes. Hot sauce. Pre-made or packaged marinades. Pre-made or packaged taco seasonings. Relishes. Regular salad dressings. Salsa.  Other foods  Salted popcorn and pretzels. Corn chips and puffs. Potato and tortilla chips. Canned or dried soups. Pizza. Frozen entrees and pot pies.  The items listed above may not be a complete list of foods and beverages you should avoid. Contact a dietitian for more information.  Summary  · Eating less sodium can help lower your blood pressure, reduce swelling, and protect your heart, liver, and kidneys.  · Most people on this plan should limit their sodium intake to 1,500-2,000 mg (milligrams) of sodium each day.  · Canned, boxed, and frozen foods are high in sodium. Restaurant foods, fast foods, and pizza are also very high in sodium.  You also get sodium by adding salt to food.  · Try to cook at home, eat more fresh fruits and vegetables, and eat less fast food and canned, processed, or prepared foods.  This information is not intended to replace advice given to you by your health care provider. Make sure you discuss any questions you have with your health care provider.  Document Revised: 01/22/2021 Document Reviewed: 11/18/2020  [employer] Patient Education © 2021 [employer]

## 2021-11-04 NOTE — ASSESSMENT & PLAN NOTE
Has been unable to tolerate Vascepa due to cost issues as well as some dietary issues.  She is to get lipids and LFTs tomorrow was going to have her hold off on restarting anything until then to see how her triglycerides were

## 2021-11-04 NOTE — PROGRESS NOTES
Chief Complaint  Hypertension    Subjective    Patient has been doing well she has not been experiencing chest pain shortness of breath or syncopal episodes.  She does continue to exercise regularly walking and no change with her functional status    Past Medical History:   Diagnosis Date   • Anxiety    • Cancer (HCC)     LEFT   • Cataract    • GERD (gastroesophageal reflux disease)    • Heart murmur    • HTN (hypertension)    • Hyperlipidemia    • Sinus infection 05/20/2019    PT PRESCRIBED ANTIBIOTIC FINISHED 06-          Current Outpatient Medications:   •  anastrozole (ARIMIDEX) 1 MG tablet, Take 1 mg by mouth Daily., Disp: , Rfl:   •  APPLE CIDER VINEGAR PO, Take  by mouth., Disp: , Rfl:   •  carvedilol (COREG) 6.25 MG tablet, Take 6.25 mg by mouth 2 (Two) Times a Day With Meals., Disp: , Rfl:   •  Coenzyme Q10 (CO Q 10 PO), Take  by mouth., Disp: , Rfl:   •  Glucosamine-Chondroitin (OSTEO BI-FLEX REGULAR STRENGTH PO), Take  by mouth., Disp: , Rfl:   •  Multiple Vitamins-Minerals (ZINC PO), Take  by mouth., Disp: , Rfl:   •  Probiotic Product (PRO-BIOTIC BLEND PO), Take  by mouth., Disp: , Rfl:   •  Rosuvastatin Calcium 40 MG capsule sprinkle, Take 40 mg by mouth Daily., Disp: , Rfl:   •  Calcium Carb-Cholecalciferol (CALCIUM 500 +D PO), Take  by mouth., Disp: , Rfl:   •  Calcium Carbonate-Vitamin D (calcium-vitamin D) 500-200 MG-UNIT tablet per tablet, Calcium 500 + D 500 mg(1,250mg) -200 unit oral tablet take 1 tablet by oral route daily   Active, Disp: , Rfl:   •  ondansetron (ZOFRAN) 8 MG tablet, Take 1 tablet by mouth Every 8 (Eight) Hours As Needed for Nausea or Vomiting., Disp: 30 tablet, Rfl: 2  •  simvastatin (ZOCOR) 40 MG tablet, Take 40 mg by mouth Every Night., Disp: , Rfl:     Medications Discontinued During This Encounter   Medication Reason   • multivitamin with minerals tablet tablet      No Known Allergies     Social History     Tobacco Use   • Smoking status: Former Smoker      "Packs/day: 0.25     Years: 16.00     Pack years: 4.00     Types: Cigarettes     Quit date:      Years since quittin.8   • Smokeless tobacco: Never Used   Vaping Use   • Vaping Use: Never used   Substance Use Topics   • Alcohol use: Yes     Comment: 1-2 DRINKS MONTHLY    • Drug use: No       Family History   Problem Relation Age of Onset   • Colon cancer Mother 89   • Hearing loss Mother    • Hypertension Mother    • Hearing loss Father    • Heart attack Father    • Diabetes Brother    • Heart attack Brother    • Depression Daughter    • Breast cancer Maternal Aunt         UNKNOWN AGE    • Cancer Cousin         FEMALE CANCER OF UNKNOWN ORGIN 60S   • Malig Hyperthermia Neg Hx         Objective     /70   Pulse 67   Ht 165.1 cm (65\")   Wt 83.5 kg (184 lb)   BMI 30.62 kg/m²       Physical Exam    General Appearance:   · no acute distress  · Alert and oriented x3  HENT:   · lips not cyanotic  · Atraumatic  Neck:  · No jvd   · supple  Respiratory:  · no respiratory distress  · normal breath sounds  · no rales  Cardiovascular:  · Regular rate and rhythm  · no S3, no S4   · 2/6 systolic murmur  · no rub  Extremities  · No cyanosis  · lower extremity edema: none    Skin:   · warm, dry  · No rashes          No results found for: TSH   No results found for: FREET4   No results found for: DDIMERQUANT  No results found for: MG   No results found for: DIGOXIN   No results found for: TROPONINT          No results found for: POCTROP    Results for orders placed in visit on 21    Adult Transthoracic Echo Complete w/ Color, Spectral and Contrast if necessary per protocol    Interpretation Summary  · Calculated left ventricular EF = 59% Estimated left ventricular EF was in agreement with the calculated left ventricular EF.  · Left ventricular wall thickness is consistent with mild concentric hypertrophy.  · Severe aortic valve stenosis is present.  · Possibly bicuspid aortic valve with fibrocalcific changes  · " Aortic valve dimensionless index is 0.3 .        Diagnoses and all orders for this visit:    1. Nonrheumatic aortic valve stenosis (Primary)  Assessment & Plan:  Patient with severe asymptomatic aortic stenosis still with good functional capacity recommend repeating echocardiogram in 6 months went over symptoms with patient instructed her to give us a call should she develop any change in her exercise capacity between now and next visit    Orders:  -     Adult Transthoracic Echo Complete W/ Cont if Necessary Per Protocol; Future    2. Primary hypertension    3. Hyperlipidemia LDL goal <100  Assessment & Plan:  Has been unable to tolerate Vascepa due to cost issues as well as some dietary issues.  She is to get lipids and LFTs tomorrow was going to have her hold off on restarting anything until then to see how her triglycerides were            Follow Up     Return in about 6 months (around 5/4/2022) for EKG with F/U.          Patient was given instructions and counseling regarding her condition or for health maintenance advice. Please see specific information pulled into the AVS if appropriate.

## 2021-12-14 NOTE — TELEPHONE ENCOUNTER
Caller: Melina Martin    Relationship: Self    Best call back number: [phone]    Requested Prescriptions:   Requested Prescriptions     Pending Prescriptions Disp Refills   • anastrozole (ARIMIDEX) 1 MG tablet       Sig: Take 1 tablet by mouth Daily.        Pharmacy where request should be sent: [facility] [address] [place] [address] [phone] [facility] [phone] [facility]     Additional details provided by patient: PATIENT HAS ABOUT 7 DAYS LEFT     Does the patient have less than a 3 day supply:  [] Yes  [x] No

## 2021-12-15 RX ORDER — ANASTROZOLE 1 MG/1
1 TABLET ORAL DAILY
Qty: 90 TABLET | Refills: 3 | Status: SHIPPED | OUTPATIENT
Start: 2021-12-15 | End: 2022-03-15

## 2022-04-12 ENCOUNTER — TELEPHONE (OUTPATIENT)
Dept: OBSTETRICS AND GYNECOLOGY | Facility: CLINIC | Age: 72
End: 2022-04-12

## 2022-04-12 NOTE — TELEPHONE ENCOUNTER
Called patient and left message letting her know Dr. Pena will be out of the office and we needed to reschedule her appointment.

## 2022-05-09 ENCOUNTER — OFFICE VISIT (OUTPATIENT)
Dept: OBSTETRICS AND GYNECOLOGY | Facility: CLINIC | Age: 72
End: 2022-05-09

## 2022-05-09 VITALS
SYSTOLIC BLOOD PRESSURE: 124 MMHG | WEIGHT: 170 LBS | BODY MASS INDEX: 29.02 KG/M2 | HEART RATE: 64 BPM | HEIGHT: 64 IN | DIASTOLIC BLOOD PRESSURE: 74 MMHG

## 2022-05-09 DIAGNOSIS — Z01.419 WELL WOMAN EXAM: Primary | ICD-10-CM

## 2022-05-09 DIAGNOSIS — Z12.31 ENCOUNTER FOR SCREENING MAMMOGRAM FOR MALIGNANT NEOPLASM OF BREAST: ICD-10-CM

## 2022-05-09 PROCEDURE — 3015F CERV CANCER SCREEN DOCD: CPT | Performed by: NURSE PRACTITIONER

## 2022-05-09 PROCEDURE — G0101 CA SCREEN;PELVIC/BREAST EXAM: HCPCS | Performed by: NURSE PRACTITIONER

## 2022-05-09 NOTE — PROGRESS NOTES
"  HPI:   71 y.o..Presents for well woman exam. Contraception or HRT: Post menopausal  Menses:   No vaginal bleeding.   Pain:  None  Last pap normal   Complaints: Pt has no complaints today.  Experiences mild urinary incontinence  Not sexually active    Past Medical History:   Diagnosis Date   • Anxiety    • Cancer (HCC)     LEFT Breast lumpectomy    • Cataract    • GERD (gastroesophageal reflux disease)    • Heart murmur    • HTN (hypertension)    • Hyperlipidemia    • Sinus infection 2019    PT PRESCRIBED ANTIBIOTIC FINISHED 2019       Past Surgical History:   Procedure Laterality Date   • BREAST BIOPSY Left     MALIGNANT(LOW GRADE DUCTAL CARCINOMA)   • BREAST LUMPECTOMY Left 2019    Procedure: Left breast needle localized lumpectomy,;  Surgeon: Beatriz Fuentes MD;  [place] [facility] [place] [facility] [place]  Service: General   •  SECTION      X 1   • COLONOSCOPY     • HAND SURGERY Right    • OVARIAN CYST REMOVAL        Family History   Problem Relation Age of Onset   • Colon cancer Mother 89   • Hearing loss Mother    • Hypertension Mother    • Hearing loss Father    • Heart attack Father    • Diabetes Brother    • Heart attack Brother    • Depression Daughter    • Breast cancer Maternal Aunt         UNKNOWN AGE    • Cancer Cousin         FEMALE CANCER OF UNKNOWN ORGIN 60S   • Malig Hyperthermia Neg Hx         PCP: does manage PMHx and preventative labs    PHYSICAL EXAM: Chaperone present /74   Pulse 64   Ht 163.2 cm (64.25\")   Wt 77.1 kg (170 lb)   LMP  (LMP Unknown)   BMI 28.95 kg/m²   General- NAD, alert and oriented, appropriate  Psych- Normal mood, good memory  Neck- No masses, no thyroid enlargement  Lymphatic- No palpable neck, axillary, or groin nodes  CV- Regular rhythm, systolic murmur present  Resp- CTA to bases, no wheezes  Abdomen- Soft, non distended, non tender, no masses  Breast left-  Bilaterally symmetrical, no masses, non tender, no nipple discharge  Breast " right- Bilaterally symmetrical, no masses, non tender, no nipple discharge  External genitalia- Normal female, no lesions  Urethra/meatus- Normal, no masses, non tender, no prolapse  Bladder- Normal, no masses, non tender, no prolapse  Vagina- Normal, atrophic, no lesions, no discharge, no prolapse  Cvx- Normal, no lesions, no discharge, No cervical motion tenderness  Uterus- Normal size, shape & consistency.  Non tender, mobile, & no prolapse  Adnexa- No mass, non tender  Anus/Rectum/Perineum- Not performed  Ext- No edema, no cyanosis    Skin- No lesions, no rashes, no acanthosis nigricans       ASSESSMENT and PLAN:    Diagnoses and all orders for this visit:    1. Well woman exam (Primary)    2. Encounter for screening mammogram for malignant neoplasm of breast  -     Mammo Screening Digital Tomosynthesis Bilateral With CAD; Future        Preventative:   BREAST HEALTH- Monthly self breast exam importance and how to reviewed. MMG and/or MRI (prn) reviewed per society guidelines and her individual history. Screen: Updated today  CERVICAL CANCER Screening- Reviewed current ASCCP guidelines for screening w and wo cotest HPV, age specific.  Screen: Not medically needed  COLON CANCER Screening- Reviewed current medical society guidelines and options.  Screen:  Already up to date  BONE HEALTH- Reviewed current medical society guidelines and options for testing, calcium and vit D intake.  Weight bearing exercise.  DEXA: Already up to date  Follow up PCP/Specialist PMHx and Labs  Myriad: Does not qualify.  HRT R/B/A/SE/E all options including non-FDA approved options discussed w pt.         Pelvic floor rehab and kegel exercises recommended for management of urinary incontinence, declines at this time      She understands the importance of having any ordered tests to be performed in a timely fashion.  The risks of not performing them include, but are not limited to, advanced cancer stages, bone loss from osteoporosis  and/or subsequent increase in morbidity and/or mortality.  She is encouraged to review her results online and/or contact or office if she has questions.     Follow Up:  Return in about 1 year (around 5/9/2023).        Martha Montes De Oca, CARLOS  05/09/2022

## 2022-05-12 ENCOUNTER — OFFICE VISIT (OUTPATIENT)
Dept: CARDIOLOGY | Facility: CLINIC | Age: 72
End: 2022-05-12

## 2022-05-12 VITALS
HEART RATE: 64 BPM | SYSTOLIC BLOOD PRESSURE: 160 MMHG | WEIGHT: 172 LBS | DIASTOLIC BLOOD PRESSURE: 55 MMHG | HEIGHT: 64 IN | BODY MASS INDEX: 29.37 KG/M2

## 2022-05-12 DIAGNOSIS — I35.0 NONRHEUMATIC AORTIC VALVE STENOSIS: Primary | ICD-10-CM

## 2022-05-12 DIAGNOSIS — I10 PRIMARY HYPERTENSION: ICD-10-CM

## 2022-05-12 PROCEDURE — 99213 OFFICE O/P EST LOW 20 MIN: CPT | Performed by: INTERNAL MEDICINE

## 2022-05-12 NOTE — ASSESSMENT & PLAN NOTE
Patient is doing well symptomatically no chest pain or shortness of breath exercising regularly 45 minutes a day walking with no change in her functional capacity.  Her echocardiogram does show severe stenosis feel that she is close to needing surgical valve replacement instructed her not to do any strenuous physical activities and will repeat echocardiogram on follow-up in 6 months

## 2022-05-12 NOTE — ASSESSMENT & PLAN NOTE
Mild systolic elevation in office encourage patient to keep a home blood pressure log for monitoring purposes continue on Coreg 6.25 mg twice daily dosing

## 2022-05-12 NOTE — PROGRESS NOTES
Chief Complaint  D/O of aorta, Hypertension, and Non-rheumatic aortic valve stenosis    Subjective    Patient has been doing well no complaints or problems.  Denies any chest pain shortness of breath or syncope or presyncopal episodes.  She is exercising regularly walking 45 minutes a day has no change in her functional capacity.  Denies any problems with lower extremity edema    Past Medical History:   Diagnosis Date   • Anxiety    • Cancer (HCC)     LEFT Breast lumpectomy    • Cataract    • GERD (gastroesophageal reflux disease)    • Heart murmur    • Heart murmur    • HTN (hypertension)    • Hyperlipidemia    • Sinus infection 2019    PT PRESCRIBED ANTIBIOTIC FINISHED 2019          Current Outpatient Medications:   •  ANASTROZOLE PO, , Disp: , Rfl:   •  APPLE CIDER VINEGAR PO, Take  by mouth., Disp: , Rfl:   •  carvedilol (COREG) 6.25 MG tablet, Take 6.25 mg by mouth 2 (Two) Times a Day With Meals., Disp: , Rfl:   •  Coenzyme Q10 (CO Q 10 PO), Take  by mouth., Disp: , Rfl:   •  Glucosamine-Chondroitin (OSTEO BI-FLEX REGULAR STRENGTH PO), Take  by mouth., Disp: , Rfl:   •  Multiple Vitamins-Minerals (ZINC PO), Take  by mouth., Disp: , Rfl:   •  Probiotic Product (PRO-BIOTIC BLEND PO), Take  by mouth., Disp: , Rfl:   •  Rosuvastatin Calcium 40 MG capsule sprinkle, Take 40 mg by mouth Daily., Disp: , Rfl:     Medications Discontinued During This Encounter   Medication Reason   • simvastatin (ZOCOR) 40 MG tablet *Therapy completed   • ondansetron (ZOFRAN) 8 MG tablet *Therapy completed   • Calcium Carbonate-Vitamin D (calcium-vitamin D) 500-200 MG-UNIT tablet per tablet *Therapy completed   • Calcium Carb-Cholecalciferol (CALCIUM 500 +D PO) *Therapy completed     No Known Allergies     Social History     Tobacco Use   • Smoking status: Former Smoker     Packs/day: 0.25     Years: 16.00     Pack years: 4.00     Types: Cigarettes     Quit date:      Years since quittin.3   • Smokeless tobacco:  "Never Used   Vaping Use   • Vaping Use: Never used   Substance Use Topics   • Alcohol use: Yes     Comment: 1-2 DRINKS MONTHLY    • Drug use: No       Family History   Problem Relation Age of Onset   • Colon cancer Mother 89   • Hearing loss Mother    • Hypertension Mother    • Hearing loss Father    • Heart attack Father    • Diabetes Brother    • Heart attack Brother    • Depression Daughter    • Breast cancer Maternal Aunt         UNKNOWN AGE    • Cancer Cousin         FEMALE CANCER OF UNKNOWN ORGIN 60S   • Malig Hyperthermia Neg Hx         Objective     /55   Pulse 64   Ht 162.6 cm (64\")   Wt 78 kg (172 lb)   BMI 29.52 kg/m²       Physical Exam    General Appearance:   · no acute distress  · Alert and oriented x3  HENT:   · lips not cyanotic  · Atraumatic  Neck:  · No jvd   · supple  Respiratory:  · no respiratory distress  · normal breath sounds  · no rales  Cardiovascular:  · Regular rate and rhythm  · no S3, no S4   · 2/6 systolic murmur  · no rub  Extremities  · No cyanosis  · lower extremity edema: none    Skin:   · warm, dry  · No rashes      Result Review :     No results found for: PROBNP             No results found for: POCTROP    Results for orders placed in visit on 11/04/21    Adult Transthoracic Echo Complete w/ Color, Spectral and Contrast if necessary per protocol    Interpretation Summary  · Calculated left ventricular EF = 59% Estimated left ventricular EF was in agreement with the calculated left ventricular EF.  · Left ventricular wall thickness is consistent with mild concentric hypertrophy.  · Severe aortic valve stenosis is present.  · Possibly bicuspid aortic valve with fibrocalcific changes  · Aortic valve dimensionless index is 0.3 .              Diagnoses and all orders for this visit:    1. Nonrheumatic aortic valve stenosis (Primary)  Assessment & Plan:  Patient is doing well symptomatically no chest pain or shortness of breath exercising regularly 45 minutes a day walking " with no change in her functional capacity.  Her echocardiogram does show severe stenosis feel that she is close to needing surgical valve replacement instructed her not to do any strenuous physical activities and will repeat echocardiogram on follow-up in 6 months    Orders:  -     Adult Transthoracic Echo Complete W/ Cont if Necessary Per Protocol; Future    2. Primary hypertension  Assessment & Plan:  Mild systolic elevation in office encourage patient to keep a home blood pressure log for monitoring purposes continue on Coreg 6.25 mg twice daily dosing            Follow Up     No follow-ups on file.          Patient was given instructions and counseling regarding her condition or for health maintenance advice. Please see specific information pulled into the AVS if appropriate.

## 2022-09-21 ENCOUNTER — HOSPITAL ENCOUNTER (OUTPATIENT)
Dept: MAMMOGRAPHY | Facility: HOSPITAL | Age: 72
Discharge: HOME | End: 2022-09-21
Admitting: NURSE PRACTITIONER

## 2022-09-21 DIAGNOSIS — Z12.31 ENCOUNTER FOR SCREENING MAMMOGRAM FOR MALIGNANT NEOPLASM OF BREAST: ICD-10-CM

## 2022-09-21 PROCEDURE — 77067 SCR MAMMO BI INCL CAD: CPT

## 2022-09-21 PROCEDURE — 77063 BREAST TOMOSYNTHESIS BI: CPT

## 2022-10-11 ENCOUNTER — HOSPITAL ENCOUNTER (OUTPATIENT)
Dept: CARDIOLOGY | Facility: HOSPITAL | Age: 72
Discharge: HOME | End: 2022-10-11
Admitting: INTERNAL MEDICINE

## 2022-10-11 DIAGNOSIS — I35.0 NONRHEUMATIC AORTIC VALVE STENOSIS: ICD-10-CM

## 2022-10-11 LAB
BH CV ECHO MEAS - AI P1/2T: 386 MSEC
BH CV ECHO MEAS - AO MAX PG: 78 MMHG
BH CV ECHO MEAS - AO MEAN PG: 44 MMHG
BH CV ECHO MEAS - AO ROOT DIAM: 2.4 CM
BH CV ECHO MEAS - AO V2 MAX: 441 CM/SEC
BH CV ECHO MEAS - AO V2 VTI: 83.6 CM
BH CV ECHO MEAS - AVA(I,D): 0.7 CM2
BH CV ECHO MEAS - EF(MOD-BP): 62 %
BH CV ECHO MEAS - IVSD: 1.5 CM
BH CV ECHO MEAS - LA DIMENSION: 3.5 CM
BH CV ECHO MEAS - LAT PEAK E' VEL: 5.1 CM/SEC
BH CV ECHO MEAS - LV MAX PG: 7 MMHG
BH CV ECHO MEAS - LV MEAN PG: 4 MMHG
BH CV ECHO MEAS - LV V1 MAX: 129 CM/SEC
BH CV ECHO MEAS - LV V1 VTI: 28.9 CM
BH CV ECHO MEAS - LVIDD: 3.6 CM
BH CV ECHO MEAS - LVIDS: 2.3 CM
BH CV ECHO MEAS - LVOT DIAM: 1.7 CM
BH CV ECHO MEAS - LVPWD: 1.6 CM
BH CV ECHO MEAS - MED PEAK E' VEL: 4.79 CM/SEC
BH CV ECHO MEAS - MV A MAX VEL: 118 CM/SEC
BH CV ECHO MEAS - MV DEC TIME: 294 MSEC
BH CV ECHO MEAS - MV E MAX VEL: 87.5 CM/SEC
BH CV ECHO MEAS - MV E/A: 0.7
BH CV ECHO MEAS - RVDD: 3 CM
BH CV ECHO MEAS - TR MAX PG: 22 MMHG
BH CV ECHO MEAS - TR MAX VEL: 233 CM/SEC
BH CV ECHO MEASUREMENTS AVERAGE E/E' RATIO: 17.69
IVRT: 100 MSEC
LEFT ATRIUM VOLUME INDEX: 15.4 ML/M2
MAXIMAL PREDICTED HEART RATE: 149 BPM
STRESS TARGET HR: 127 BPM

## 2022-10-11 PROCEDURE — 93306 TTE W/DOPPLER COMPLETE: CPT | Performed by: INTERNAL MEDICINE

## 2022-10-11 PROCEDURE — 93306 TTE W/DOPPLER COMPLETE: CPT

## 2022-10-12 ENCOUNTER — TELEPHONE (OUTPATIENT)
Dept: CARDIOLOGY | Facility: CLINIC | Age: 72
End: 2022-10-12

## 2022-10-12 NOTE — TELEPHONE ENCOUNTER
FERNANDA patient regarding results. Voiced understanding. Patient denies any SOA or fatigue with activity. Did advised she needed to notify office if started having symptoms.

## 2022-10-12 NOTE — TELEPHONE ENCOUNTER
----- Message from CARLOS Josue sent at 10/11/2022  4:47 PM EDT -----  Notify pt echo result: EF 62% and severe aortic stenosis (murmur). Find out if she is having any worsening shortness of breath or fatigue with activity

## 2022-12-01 ENCOUNTER — OFFICE VISIT (OUTPATIENT)
Dept: CARDIOLOGY | Facility: CLINIC | Age: 72
End: 2022-12-01

## 2022-12-01 VITALS
DIASTOLIC BLOOD PRESSURE: 80 MMHG | WEIGHT: 175 LBS | HEIGHT: 64 IN | BODY MASS INDEX: 29.88 KG/M2 | HEART RATE: 80 BPM | SYSTOLIC BLOOD PRESSURE: 139 MMHG

## 2022-12-01 DIAGNOSIS — E78.5 HYPERLIPIDEMIA LDL GOAL <100: ICD-10-CM

## 2022-12-01 DIAGNOSIS — I35.0 NONRHEUMATIC AORTIC VALVE STENOSIS: Primary | ICD-10-CM

## 2022-12-01 DIAGNOSIS — I10 PRIMARY HYPERTENSION: ICD-10-CM

## 2022-12-01 PROCEDURE — 99214 OFFICE O/P EST MOD 30 MIN: CPT | Performed by: INTERNAL MEDICINE

## 2022-12-01 RX ORDER — DIPHENHYDRAMINE HCL 25 MG
25 CAPSULE ORAL EVERY 6 HOURS PRN
COMMUNITY

## 2022-12-01 NOTE — ASSESSMENT & PLAN NOTE
Patient is doing well symptomatically no current symptoms for a stenosis we will repeat echocardiogram in 6 months counseled symptoms

## 2022-12-01 NOTE — PROGRESS NOTES
Chief Complaint  Nonrheumatic aortic valve stenosis, Follow-up, Hypertension, and Hyperlipidemia    Subjective    Patient is doing well not been having any problems with chest pain shortness of breath syncope or presyncope. Patient is routinely exercising with a dog with walking and not experienced any change in functional status    Past Medical History:   Diagnosis Date   • Anxiety    • Cancer (HCC)     LEFT Breast lumpectomy    • Cataract    • GERD (gastroesophageal reflux disease)    • Heart murmur    • Heart murmur    • HTN (hypertension)    • Hyperlipidemia    • Sinus infection 2019    PT PRESCRIBED ANTIBIOTIC FINISHED 2019          Current Outpatient Medications:   •  ANASTROZOLE PO, , Disp: , Rfl:   •  APPLE CIDER VINEGAR PO, Take  by mouth., Disp: , Rfl:   •  carvedilol (COREG) 6.25 MG tablet, Take 6.25 mg by mouth 2 (Two) Times a Day With Meals., Disp: , Rfl:   •  Coenzyme Q10 (CO Q 10 PO), Take  by mouth., Disp: , Rfl:   •  diphenhydrAMINE (BENADRYL) 25 mg capsule, Take 25 mg by mouth Every 6 (Six) Hours As Needed for Itching., Disp: , Rfl:   •  Glucosamine-Chondroitin (OSTEO BI-FLEX REGULAR STRENGTH PO), Take  by mouth., Disp: , Rfl:   •  Multiple Vitamins-Minerals (ZINC PO), Take  by mouth., Disp: , Rfl:   •  Probiotic Product (PRO-BIOTIC BLEND PO), Take  by mouth., Disp: , Rfl:   •  Rosuvastatin Calcium 40 MG capsule sprinkle, Take 40 mg by mouth Daily., Disp: , Rfl:     There are no discontinued medications.  No Known Allergies     Social History     Tobacco Use   • Smoking status: Former     Packs/day: 0.25     Years: 15.00     Pack years: 3.75     Types: Cigarettes     Quit date: 1982     Years since quittin.9   • Smokeless tobacco: Never   Vaping Use   • Vaping Use: Never used   Substance Use Topics   • Alcohol use: Yes     Comment: 1-2 DRINKS MONTHLY    • Drug use: No       Family History   Problem Relation Age of Onset   • Colon cancer Mother 89   • Hearing loss Mother   "  • Hypertension Mother    • Hearing loss Father    • Heart attack Father    • Diabetes Brother    • Heart attack Brother    • Depression Daughter    • Breast cancer Maternal Aunt         UNKNOWN AGE    • Cancer Cousin         FEMALE CANCER OF UNKNOWN ORGIN 60S   • Malig Hyperthermia Neg Hx         Objective     /80   Pulse 80   Ht 162.6 cm (64\")   Wt 79.4 kg (175 lb)   BMI 30.04 kg/m²       Physical Exam    General Appearance:   · no acute distress  · Alert and oriented x3  HENT:   · lips not cyanotic  · Atraumatic  Neck:  · No jvd   · supple  Respiratory:  · no respiratory distress  · normal breath sounds  · no rales  Cardiovascular:  · Regular rate and rhythm  · no S3, no S4   · 2/6 systolic murmur  · no rub  Extremities  · No cyanosis  · lower extremity edema: none    Skin:   · warm, dry  · No rashes      Result Review :     No results found for: PROBNP      Results for orders placed during the hospital encounter of 10/11/22    Adult Transthoracic Echo Complete W/ Cont if Necessary Per Protocol    Interpretation Summary  •  Calculated left ventricular EF = 62% Estimated left ventricular EF was in agreement with the calculated left ventricular EF.  •  Left ventricular wall thickness is consistent with mild to moderate concentric hypertrophy.  •  Severe aortic valve stenosis is present.  •  Left atrial dilation mild to moderate  •  Not able to visualize aortic valve leaflets well                 Diagnoses and all orders for this visit:    1. Nonrheumatic aortic valve stenosis (Primary)  Assessment & Plan:  Patient is doing well symptomatically no current symptoms for a stenosis we will repeat echocardiogram in 6 months counseled symptoms    Orders:  -     Cancel: Adult Transthoracic Echo Complete W/ Cont if Necessary Per Protocol; Future  -     Adult Transthoracic Echo Complete W/ Cont if Necessary Per Protocol; Future    2. Primary hypertension  Assessment & Plan:  Blood pressures controlled continue " with Coreg 6.25 twice daily dosing      3. Hyperlipidemia LDL goal <100          Follow Up     Return in about 6 months (around 6/1/2023) for EKG with F/U.          Patient was given instructions and counseling regarding her condition or for health maintenance advice. Please see specific information pulled into the AVS if appropriate.

## 2022-12-07 ENCOUNTER — TELEPHONE (OUTPATIENT)
Dept: ONCOLOGY | Facility: HOSPITAL | Age: 72
End: 2022-12-07

## 2022-12-07 NOTE — TELEPHONE ENCOUNTER
Caller: ADIN    Relationship: SELF    Best call back number: [phone]    Requested Prescriptions:   ANASTROZOLE - 1 MG DAILY     Pharmacy where request should be sent:    [address] [phone] [facility] [phone] [facility]    Additional details provided by patient: SHE IS NOT SURE IF WE CAN REFILL OR IF SHE NEEDS TO MAKE AN APPT BEFORE REFILLING THE MEDICATION. SHE AHS ABOUT 10 PILLS LEFT.    Does the patient have less than a 3 day supply:  [] Yes  [x] No    Would you like a call back once the refill request has been completed: [x] Yes [] No    If the office needs to give you a call back, can they leave a voicemail: [x] Yes [] No    Tayler Godfrey   12/07/22 08:33 EST

## 2022-12-15 ENCOUNTER — OFFICE VISIT (OUTPATIENT)
Dept: ONCOLOGY | Facility: HOSPITAL | Age: 72
End: 2022-12-15
Payer: MEDICARE

## 2022-12-15 VITALS
TEMPERATURE: 96.9 F | DIASTOLIC BLOOD PRESSURE: 57 MMHG | RESPIRATION RATE: 18 BRPM | WEIGHT: 178.13 LBS | SYSTOLIC BLOOD PRESSURE: 152 MMHG | HEIGHT: 64 IN | OXYGEN SATURATION: 98 % | HEART RATE: 83 BPM | BODY MASS INDEX: 30.41 KG/M2

## 2022-12-15 DIAGNOSIS — D05.12 BREAST NEOPLASM, TIS (DCIS), LEFT: ICD-10-CM

## 2022-12-15 PROCEDURE — G0463 HOSPITAL OUTPT CLINIC VISIT: HCPCS | Performed by: INTERNAL MEDICINE

## 2022-12-15 PROCEDURE — 99214 OFFICE O/P EST MOD 30 MIN: CPT | Performed by: INTERNAL MEDICINE

## 2022-12-15 RX ORDER — ANASTROZOLE 1 MG/1
1 TABLET ORAL DAILY
Qty: 90 TABLET | Refills: 3 | Status: SHIPPED | OUTPATIENT
Start: 2022-12-15

## 2022-12-15 NOTE — PROGRESS NOTES
"Patient  Melina Martin    [facility] HEMATOLOGY & ONCOLOGY    Chief Complaint  Breast Cancer    Referring Provider: CARLOS Platt  PCP: Maria Ines Nguyen APRN    Subjective          Oncology/Hematology History Overview Note     DCIS:     LEFT breast, UIQ, Diagnosed 4/22/19; 6 mm, \"low grade\", ER: 100%, AZ: 100%; staged pTis N0 M0 stage 0      - Left lumpectomy 6/13/19 -Dr. Fuentes      - Adjuvant XRT complete 8/26/19 - Dr. Sanderson      - started adjuvant anastrazole 9/16/19             HPI  Patient comes in today for follow-up while on anastrozole.  She started this medication in September 2019.  She continues to see Dr. Fuentes.  Her mammogram and DEXA scan were both done in September and both were normal.  She does not have any new complaints or concerns.      Review of Systems   Constitutional: Negative for appetite change, diaphoresis, fatigue, fever, unexpected weight gain and unexpected weight loss.   HENT: Negative for hearing loss, mouth sores, sore throat, swollen glands, trouble swallowing and voice change.    Eyes: Negative for blurred vision.   Respiratory: Negative for cough, shortness of breath and wheezing.    Cardiovascular: Negative for chest pain and palpitations.   Gastrointestinal: Negative for abdominal pain, blood in stool, constipation, diarrhea, nausea and vomiting.   Endocrine: Negative for cold intolerance and heat intolerance.   Genitourinary: Negative for difficulty urinating, dysuria, frequency, hematuria and urinary incontinence.   Musculoskeletal: Negative for arthralgias, back pain and myalgias.   Skin: Negative for rash, skin lesions and wound.   Neurological: Negative for dizziness, seizures, weakness, numbness and headache.   Hematological: Does not bruise/bleed easily.   Psychiatric/Behavioral: Negative for depressed mood. The patient is not nervous/anxious.    All other systems reviewed and are negative.      Past Medical History:   Diagnosis " Date   • Anxiety    • Cancer (HCC)     LEFT Breast lumpectomy    • Cataract    • GERD (gastroesophageal reflux disease)    • Heart murmur    • Heart murmur    • HTN (hypertension)    • Hyperlipidemia    • Sinus infection 2019    PT PRESCRIBED ANTIBIOTIC FINISHED 2019      Past Surgical History:   Procedure Laterality Date   • BREAST BIOPSY Left     MALIGNANT(LOW GRADE DUCTAL CARCINOMA)   • BREAST LUMPECTOMY Left 2019    Procedure: Left breast needle localized lumpectomy,;  Surgeon: Beatriz Fuentes MD;  [place] [facility] [place] [facility] [place]  Service: General   •  SECTION      X 1   • COLONOSCOPY     • HAND SURGERY Right    • OVARIAN CYST REMOVAL       Social History     Socioeconomic History   • Marital status:    Tobacco Use   • Smoking status: Former     Packs/day: 0.25     Years: 15.00     Pack years: 3.75     Types: Cigarettes     Quit date: 1982     Years since quittin.0   • Smokeless tobacco: Never   Vaping Use   • Vaping Use: Never used   Substance and Sexual Activity   • Alcohol use: Yes     Comment: 1-2 DRINKS MONTHLY    • Drug use: No   • Sexual activity: Not Currently     Partners: Male     Birth control/protection: Condom, Pill, Diaphragm, Abstinence, Coitus interruptus, Post-menopausal     Family History   Problem Relation Age of Onset   • Colon cancer Mother 89   • Hearing loss Mother    • Hypertension Mother    • Hearing loss Father    • Heart attack Father    • Diabetes Brother    • Heart attack Brother    • Depression Daughter    • Breast cancer Maternal Aunt         UNKNOWN AGE    • Cancer Cousin         FEMALE CANCER OF UNKNOWN ORGIN 60S   • Malig Hyperthermia Neg Hx        Objective   Physical Exam  General: Alert, cooperative, no acute distress  Eyes: Anicteric sclera, PERRLA  Respiratory: normal respiratory effort  Cardiovascular: no lower extremity edema  Skin: Normal tone, no rash, no lesions  Psychiatric: Appropriate affect, intact judgment  Neurologic:  "No focal sensory or motor deficits, normal cognition   Musculoskeletal: Normal muscle strength and tone  Extremities: No clubbing, cyanosis, or deformities    Vitals:    12/15/22 1313   BP: 152/57   Pulse: 83   Resp: 18   Temp: 96.9 °F (36.1 °C)   SpO2: 98%   Weight: 80.8 kg (178 lb 2.1 oz)   Height: 162.6 cm (64.02\")   PainSc: 0-No pain     ECOG score: 0         PHQ-9 Total Score:         Result Review :   The following data was reviewed by: Ila Sharma MD PhD on 12/15/2022:  Lab Results   Component Value Date    HGB 15.2 05/20/2019    HCT 46.9 (H) 05/20/2019    MCV 86.2 05/20/2019     05/20/2019    WBC 6.72 05/20/2019     Lab Results   Component Value Date    GLUCOSE 204 (H) 05/20/2019    BUN 11 05/20/2019    CREATININE 0.72 05/20/2019     05/20/2019    K 4.6 05/20/2019    CL 99 05/20/2019    CO2 28.5 05/20/2019    CALCIUM 9.1 05/20/2019          Assessment and Plan    Diagnoses and all orders for this visit:    1. Breast neoplasm, Tis (DCIS), left    Other orders  -     anastrozole (ARIMIDEX) 1 MG tablet; Take 1 tablet by mouth Daily.  Dispense: 90 tablet; Refill: 3        DCIS: Patient has been on anastrozole since 2019 and is tolerating it well. She continues to see Dr. Fuentes.  Recent mammogram and DEXA scan were normal. I will refill her medication today and she will f/u in 6 months.        Patient was given instructions and counseling regarding her condition or for health maintenance advice. Please see specific information pulled into the AVS if appropriate.       "

## 2022-12-27 ENCOUNTER — TELEPHONE (OUTPATIENT)
Dept: ONCOLOGY | Facility: HOSPITAL | Age: 72
End: 2022-12-27

## 2022-12-27 NOTE — TELEPHONE ENCOUNTER
Caller: Melina Martin    Relationship: Self    Best call back number: [phone]    What is the best time to reach you: ANYTIME    Who are you requesting to speak with (clinical staff, provider,  specific staff member): CLINICAL     What was the call regarding: PT HAD APPT [facility] FOR 1/3/2023, WAS THIS A MISTAKE AS SHE WAS TOLD TO COME BACK IN 6 MONTHS?    CALL PT, ALSO NEED TO R/S 6/15/2022 F/U 2 APPT ( TRIED TO R/S AND COULDN'T)    Do you require a callback: YES

## 2023-05-09 ENCOUNTER — TELEPHONE (OUTPATIENT)
Dept: ONCOLOGY | Facility: HOSPITAL | Age: 73
End: 2023-05-09

## 2023-05-09 NOTE — TELEPHONE ENCOUNTER
Caller: Melina Martin    Relationship: Self    Best call back number: [phone]    What is the best time to reach you: ANYTIME    Who are you requesting to speak with (clinical staff, provider, specific staff member): SCHEDULING    What was the call regarding: PLEASE CALL PT TO R/S HER 6/16 APPT.     Do you require a callback: YES

## 2023-06-01 ENCOUNTER — OFFICE VISIT (OUTPATIENT)
Dept: OBSTETRICS AND GYNECOLOGY | Facility: CLINIC | Age: 73
End: 2023-06-01

## 2023-06-01 VITALS
WEIGHT: 178 LBS | HEART RATE: 72 BPM | HEIGHT: 65 IN | SYSTOLIC BLOOD PRESSURE: 172 MMHG | DIASTOLIC BLOOD PRESSURE: 100 MMHG | BODY MASS INDEX: 29.66 KG/M2

## 2023-06-01 DIAGNOSIS — Z12.31 ENCOUNTER FOR SCREENING MAMMOGRAM FOR HIGH-RISK PATIENT: ICD-10-CM

## 2023-06-01 DIAGNOSIS — Z01.419 WELL WOMAN EXAM WITH ROUTINE GYNECOLOGICAL EXAM: Primary | ICD-10-CM

## 2023-06-01 DIAGNOSIS — Z85.3 HISTORY OF LEFT BREAST CANCER: ICD-10-CM

## 2023-06-01 DIAGNOSIS — N39.3 STRESS INCONTINENCE OF URINE: ICD-10-CM

## 2023-06-02 ENCOUNTER — HOSPITAL ENCOUNTER (OUTPATIENT)
Dept: CARDIOLOGY | Facility: HOSPITAL | Age: 73
Discharge: HOME | End: 2023-06-02

## 2023-06-02 DIAGNOSIS — I35.0 NONRHEUMATIC AORTIC VALVE STENOSIS: ICD-10-CM

## 2023-06-02 PROCEDURE — 93306 TTE W/DOPPLER COMPLETE: CPT

## 2023-06-02 PROCEDURE — 93306 TTE W/DOPPLER COMPLETE: CPT | Performed by: INTERNAL MEDICINE

## 2023-06-05 LAB
BH CV ECHO MEAS - AO MAX PG: 78 MMHG
BH CV ECHO MEAS - AO MEAN PG: 44 MMHG
BH CV ECHO MEAS - AO ROOT DIAM: 2.5 CM
BH CV ECHO MEAS - AO V2 MAX: 443 CM/SEC
BH CV ECHO MEAS - AO V2 VTI: 88.4 CM
BH CV ECHO MEAS - AVA(I,D): 0.61 CM2
BH CV ECHO MEAS - EDV(CUBED): 32.8 ML
BH CV ECHO MEAS - EDV(MOD-SP2): 43.7 ML
BH CV ECHO MEAS - EDV(MOD-SP4): 44.6 ML
BH CV ECHO MEAS - EF(MOD-BP): 69.8 %
BH CV ECHO MEAS - EF(MOD-SP2): 68 %
BH CV ECHO MEAS - EF(MOD-SP4): 68.8 %
BH CV ECHO MEAS - ESV(CUBED): 8 ML
BH CV ECHO MEAS - ESV(MOD-SP2): 14 ML
BH CV ECHO MEAS - ESV(MOD-SP4): 13.9 ML
BH CV ECHO MEAS - FS: 37.5 %
BH CV ECHO MEAS - IVS/LVPW: 1.58 CM
BH CV ECHO MEAS - IVSD: 1.9 CM
BH CV ECHO MEAS - LA DIMENSION: 3.1 CM
BH CV ECHO MEAS - LAT PEAK E' VEL: 6.9 CM/SEC
BH CV ECHO MEAS - LV MASS(C)D: 181.4 GRAMS
BH CV ECHO MEAS - LV MAX PG: 6.1 MMHG
BH CV ECHO MEAS - LV MEAN PG: 3 MMHG
BH CV ECHO MEAS - LV V1 MAX: 123 CM/SEC
BH CV ECHO MEAS - LV V1 VTI: 27 CM
BH CV ECHO MEAS - LVIDD: 3.2 CM
BH CV ECHO MEAS - LVIDS: 2 CM
BH CV ECHO MEAS - LVOT AREA: 2.01 CM2
BH CV ECHO MEAS - LVOT DIAM: 1.6 CM
BH CV ECHO MEAS - LVPWD: 1.2 CM
BH CV ECHO MEAS - MED PEAK E' VEL: 6.4 CM/SEC
BH CV ECHO MEAS - MV A MAX VEL: 109 CM/SEC
BH CV ECHO MEAS - MV DEC SLOPE: 598 CM/SEC2
BH CV ECHO MEAS - MV DEC TIME: 0.2 MSEC
BH CV ECHO MEAS - MV E MAX VEL: 88 CM/SEC
BH CV ECHO MEAS - MV E/A: 0.81
BH CV ECHO MEAS - MV MAX PG: 6.1 MMHG
BH CV ECHO MEAS - MV MEAN PG: 2 MMHG
BH CV ECHO MEAS - MV P1/2T: 48.7 MSEC
BH CV ECHO MEAS - MV V2 VTI: 30.7 CM
BH CV ECHO MEAS - MVA(P1/2T): 4.5 CM2
BH CV ECHO MEAS - MVA(VTI): 1.77 CM2
BH CV ECHO MEAS - RAP SYSTOLE: 3 MMHG
BH CV ECHO MEAS - RVDD: 2.6 CM
BH CV ECHO MEAS - RVSP: 36 MMHG
BH CV ECHO MEAS - SV(LVOT): 54.3 ML
BH CV ECHO MEAS - SV(MOD-SP2): 29.7 ML
BH CV ECHO MEAS - SV(MOD-SP4): 30.7 ML
BH CV ECHO MEAS - TR MAX PG: 32.7 MMHG
BH CV ECHO MEAS - TR MAX VEL: 286 CM/SEC
BH CV ECHO MEASUREMENTS AVERAGE E/E' RATIO: 13.23
IVRT: 79 MSEC
LEFT ATRIUM VOLUME INDEX: 12.4 ML/M2
LEFT ATRIUM VOLUME: 23.3 ML
MAXIMAL PREDICTED HEART RATE: 148 BPM
STRESS TARGET HR: 126 BPM

## 2023-06-06 ENCOUNTER — TELEPHONE (OUTPATIENT)
Dept: CARDIOLOGY | Facility: CLINIC | Age: 73
End: 2023-06-06
Payer: MEDICARE

## 2023-06-06 NOTE — TELEPHONE ENCOUNTER
----- Message from CARLOS Josue sent at 6/5/2023  4:16 PM EDT -----  Notify pt echo result:   ·  Left ventricular systolic function is normal. Calculated left ventricular EF = 69.8%  ·  Mild to moderate LVH present  ·  Severe aortic valve stenosis is present. Follow up as scheduled to discuss treatment plan.  ·  Estimated right ventricular systolic pressure from tricuspid regurgitation is mildly elevated

## 2023-07-24 ENCOUNTER — CLINICAL SUPPORT (OUTPATIENT)
Dept: GASTROENTEROLOGY | Facility: CLINIC | Age: 73
End: 2023-07-24
Payer: MEDICARE

## 2023-07-24 NOTE — PROGRESS NOTES
During the Direct Access call with Melina Martin, 1950 the patient communicated that they are having an aortic valve replaced. Patient has an appointment in August to consult with her provider. I advised patient we should postpone the telephone visit until after she has that procedure. Patient agreed and voiced understanding. Patients telephone call has been rescheduled to 1/22/24.

## 2023-08-04 ENCOUNTER — OFFICE VISIT (OUTPATIENT)
Dept: CARDIOLOGY | Facility: CLINIC | Age: 73
End: 2023-08-04
Payer: MEDICARE

## 2023-08-04 VITALS
HEIGHT: 65 IN | WEIGHT: 180.6 LBS | SYSTOLIC BLOOD PRESSURE: 148 MMHG | DIASTOLIC BLOOD PRESSURE: 72 MMHG | BODY MASS INDEX: 30.09 KG/M2 | HEART RATE: 65 BPM

## 2023-08-04 DIAGNOSIS — I35.0 NONRHEUMATIC AORTIC VALVE STENOSIS: Primary | ICD-10-CM

## 2023-08-04 DIAGNOSIS — Z92.3 HISTORY OF RADIATION THERAPY: ICD-10-CM

## 2023-08-04 DIAGNOSIS — I51.7 LVH (LEFT VENTRICULAR HYPERTROPHY): ICD-10-CM

## 2023-08-04 DIAGNOSIS — I10 PRIMARY HYPERTENSION: ICD-10-CM

## 2023-08-04 DIAGNOSIS — R94.31 ABNORMAL ELECTROCARDIOGRAM (ECG) (EKG): ICD-10-CM

## 2023-08-04 RX ORDER — ICOSAPENT ETHYL 1000 MG/1
2 CAPSULE ORAL EVERY 12 HOURS SCHEDULED
COMMUNITY
Start: 2023-06-24

## 2023-08-16 ENCOUNTER — HOSPITAL ENCOUNTER (OUTPATIENT)
Dept: CARDIOLOGY | Facility: HOSPITAL | Age: 73
Discharge: HOME | End: 2023-08-16
Admitting: INTERNAL MEDICINE
Payer: MEDICARE

## 2023-08-16 DIAGNOSIS — I35.0 NONRHEUMATIC AORTIC VALVE STENOSIS: ICD-10-CM

## 2023-08-16 DIAGNOSIS — R94.31 ABNORMAL ELECTROCARDIOGRAM (ECG) (EKG): ICD-10-CM

## 2023-08-16 PROCEDURE — 93017 CV STRESS TEST TRACING ONLY: CPT

## 2023-08-16 NOTE — DISCHARGE INSTRUCTIONS
Ordering physicians/PCP will contact you with results.  Follow up with PCP/ordering physician as scheduled or as needed.  If chest pain gets worse, lasts longer, and/or doesn't go away by itself we recommend calling 911 or going to the nearest emergency room.  Resume activity as tolerated.  Continue prescribed medications as ordered.

## 2023-08-17 ENCOUNTER — TELEPHONE (OUTPATIENT)
Dept: CARDIOLOGY | Facility: CLINIC | Age: 73
End: 2023-08-17
Payer: MEDICARE

## 2023-08-17 LAB
BH CV IMMEDIATE POST RECOVERY TECH DATA SYMPTOMS: NORMAL
BH CV IMMEDIATE POST TECH DATA BLOOD PRESSURE: NORMAL MMHG
BH CV IMMEDIATE POST TECH DATA HEART RATE: 121 BPM
BH CV IMMEDIATE POST TECH DATA OXYGEN SATS: 96 %
BH CV NINE MINUTE RECOVERY TECH DATA BLOOD PRESSURE: NORMAL MMHG
BH CV NINE MINUTE RECOVERY TECH DATA HEART RATE: 82 BPM
BH CV NINE MINUTE RECOVERY TECH DATA OXYGEN SATURATION: 98 %
BH CV SIX MINUTE RECOVERY TECH DATA BLOOD PRESSURE: NORMAL
BH CV SIX MINUTE RECOVERY TECH DATA HEART RATE: 85 BPM
BH CV SIX MINUTE RECOVERY TECH DATA OXYGEN SATURATION: 98 %
BH CV SIX MINUTE RECOVERY TECH DATA SYMPTOMS: NORMAL
BH CV STRESS BP STAGE 1: NORMAL
BH CV STRESS BP STAGE 2: NORMAL
BH CV STRESS BP STAGE 3: NORMAL
BH CV STRESS DURATION MIN STAGE 1: 3
BH CV STRESS DURATION MIN STAGE 2: 3
BH CV STRESS DURATION MIN STAGE 3: 3
BH CV STRESS DURATION SEC STAGE 1: 0
BH CV STRESS DURATION SEC STAGE 2: 0
BH CV STRESS DURATION SEC STAGE 3: 0
BH CV STRESS GRADE STAGE 1: 10
BH CV STRESS GRADE STAGE 2: 12
BH CV STRESS GRADE STAGE 3: 14
BH CV STRESS HR STAGE 1: 99
BH CV STRESS HR STAGE 2: 121
BH CV STRESS HR STAGE 3: 121
BH CV STRESS METS STAGE 1: 5
BH CV STRESS METS STAGE 2: 7.5
BH CV STRESS METS STAGE 3: 10
BH CV STRESS O2 STAGE 1: 93
BH CV STRESS O2 STAGE 2: 94
BH CV STRESS O2 STAGE 3: 95
BH CV STRESS PROTOCOL 1: NORMAL
BH CV STRESS RECOVERY BP: NORMAL MMHG
BH CV STRESS RECOVERY HR: 81 BPM
BH CV STRESS RECOVERY O2: 98 %
BH CV STRESS SPEED STAGE 1: 1.7
BH CV STRESS SPEED STAGE 2: 2.5
BH CV STRESS SPEED STAGE 3: 3.4
BH CV STRESS STAGE 1: 1
BH CV STRESS STAGE 2: 2
BH CV STRESS STAGE 3: 3
BH CV THREE MINUTE POST TECH DATA BLOOD PRESSURE: NORMAL MMHG
BH CV THREE MINUTE POST TECH DATA HEART RATE: 82 BPM
BH CV THREE MINUTE POST TECH DATA OXYGEN SATURATION: 98 %
BH CV TWELVE MINUTE RECOVERY TECH DATA BLOOD PRESSURE: NORMAL MMHG
BH CV TWELVE MINUTE RECOVERY TECH DATA HEART RATE: 81 BPM
BH CV TWELVE MINUTE RECOVERY TECH DATA OXYGEN SATURATION: 98 %
BH CV TWELVE MINUTE RECOVERY TECH DATA SYMPTOMS: NORMAL
MAXIMAL PREDICTED HEART RATE: 148 BPM
PERCENT MAX PREDICTED HR: 81.76 %
STRESS BASELINE BP: NORMAL MMHG
STRESS BASELINE HR: 69 BPM
STRESS O2 SAT REST: 95 %
STRESS PERCENT HR: 96 %
STRESS POST ESTIMATED WORKLOAD: 7.1 METS
STRESS POST EXERCISE DUR MIN: 7 MIN
STRESS POST EXERCISE DUR SEC: 53 SEC
STRESS POST O2 SAT PEAK: 98 %
STRESS POST PEAK BP: NORMAL MMHG
STRESS POST PEAK HR: 121 BPM
STRESS TARGET HR: 126 BPM

## 2023-08-17 NOTE — PROGRESS NOTES
Please let her know that she did really well and thank her for having it done for me. We'll chat more after her cath next week.    Vineet delgado

## 2023-08-17 NOTE — TELEPHONE ENCOUNTER
Received call from Rosmery at [facility] regarding Melina Martin.  Patient is currently in office to have lab work drawn for upcoming procedure on 8/24.  Chart review shows no lab orders placed.  Spoke with Sydney in hospital scheduling and she stated she will place lab orders.  Notified Rosmery that lab orders will be faxed over as soon as Sydney enters them into the computer.  Rosmery verbalized understanding and provided the following fax: [phone]    Thank you,  Loli DUDLEY RN  Triage Nurse [facility] [place]   08:51 EDT

## 2023-08-17 NOTE — PROGRESS NOTES
You are cathing her next week as part of TAVR vs SAVR workup. She went 9 minutes, didn't have symptoms (just had HTN).    Traci delgado

## 2023-08-17 NOTE — TELEPHONE ENCOUNTER
Received return call from Sydney.  Lab orders were faxed successfully when procedure was initially scheduled.  Confirmed fax number with the fax number Rosmery provided.  Sydney stated she will resend lab orders now.    Thank you,  Loli DUDLEY RN  Triage Nurse [facility]   08:53 EDT

## 2023-08-24 ENCOUNTER — HOSPITAL ENCOUNTER (OUTPATIENT)
Facility: HOSPITAL | Age: 73
Setting detail: HOSPITAL OUTPATIENT SURGERY
Discharge: HOME | End: 2023-08-24
Attending: INTERNAL MEDICINE | Admitting: INTERNAL MEDICINE
Payer: MEDICARE

## 2023-08-24 ENCOUNTER — TELEPHONE (OUTPATIENT)
Dept: CARDIAC SURGERY | Facility: CLINIC | Age: 73
End: 2023-08-24
Payer: MEDICARE

## 2023-08-24 VITALS
DIASTOLIC BLOOD PRESSURE: 64 MMHG | TEMPERATURE: 97.8 F | WEIGHT: 180 LBS | HEART RATE: 69 BPM | RESPIRATION RATE: 16 BRPM | SYSTOLIC BLOOD PRESSURE: 133 MMHG | HEIGHT: 64 IN | OXYGEN SATURATION: 95 % | BODY MASS INDEX: 30.73 KG/M2

## 2023-08-24 DIAGNOSIS — I35.0 NONRHEUMATIC AORTIC VALVE STENOSIS: ICD-10-CM

## 2023-08-24 LAB — GLUCOSE BLDC GLUCOMTR-MCNC: 143 MG/DL (ref 70–130)

## 2023-08-24 PROCEDURE — 25010000002 MIDAZOLAM PER 1 MG: Performed by: INTERNAL MEDICINE

## 2023-08-24 PROCEDURE — 93458 L HRT ARTERY/VENTRICLE ANGIO: CPT | Performed by: INTERNAL MEDICINE

## 2023-08-24 PROCEDURE — C1894 INTRO/SHEATH, NON-LASER: HCPCS | Performed by: INTERNAL MEDICINE

## 2023-08-24 PROCEDURE — S0260 H&P FOR SURGERY: HCPCS | Performed by: INTERNAL MEDICINE

## 2023-08-24 PROCEDURE — C1769 GUIDE WIRE: HCPCS | Performed by: INTERNAL MEDICINE

## 2023-08-24 PROCEDURE — 25010000002 HEPARIN (PORCINE) PER 1000 UNITS: Performed by: INTERNAL MEDICINE

## 2023-08-24 PROCEDURE — 25010000002 FENTANYL CITRATE (PF) 50 MCG/ML SOLUTION: Performed by: INTERNAL MEDICINE

## 2023-08-24 PROCEDURE — 25510000001 IOPAMIDOL PER 1 ML: Performed by: INTERNAL MEDICINE

## 2023-08-24 PROCEDURE — 82948 REAGENT STRIP/BLOOD GLUCOSE: CPT

## 2023-08-24 RX ORDER — SODIUM CHLORIDE 0.9 % (FLUSH) 0.9 %
10 SYRINGE (ML) INJECTION EVERY 12 HOURS SCHEDULED
Status: DISCONTINUED | OUTPATIENT
Start: 2023-08-24 | End: 2023-08-24 | Stop reason: HOSPADM

## 2023-08-24 RX ORDER — SODIUM CHLORIDE 9 MG/ML
75 INJECTION, SOLUTION INTRAVENOUS CONTINUOUS
Status: DISCONTINUED | OUTPATIENT
Start: 2023-08-25 | End: 2023-08-24 | Stop reason: HOSPADM

## 2023-08-24 RX ORDER — HEPARIN SODIUM 1000 [USP'U]/ML
INJECTION, SOLUTION INTRAVENOUS; SUBCUTANEOUS
Status: DISCONTINUED | OUTPATIENT
Start: 2023-08-24 | End: 2023-08-24 | Stop reason: HOSPADM

## 2023-08-24 RX ORDER — LIDOCAINE HYDROCHLORIDE 20 MG/ML
INJECTION, SOLUTION INFILTRATION; PERINEURAL
Status: DISCONTINUED | OUTPATIENT
Start: 2023-08-24 | End: 2023-08-24 | Stop reason: HOSPADM

## 2023-08-24 RX ORDER — VERAPAMIL HYDROCHLORIDE 2.5 MG/ML
INJECTION, SOLUTION INTRAVENOUS
Status: DISCONTINUED | OUTPATIENT
Start: 2023-08-24 | End: 2023-08-24 | Stop reason: HOSPADM

## 2023-08-24 RX ORDER — ACETAMINOPHEN 325 MG/1
650 TABLET ORAL EVERY 4 HOURS PRN
Status: DISCONTINUED | OUTPATIENT
Start: 2023-08-24 | End: 2023-08-24 | Stop reason: HOSPADM

## 2023-08-24 RX ORDER — SODIUM CHLORIDE 0.9 % (FLUSH) 0.9 %
10 SYRINGE (ML) INJECTION AS NEEDED
Status: DISCONTINUED | OUTPATIENT
Start: 2023-08-24 | End: 2023-08-24 | Stop reason: HOSPADM

## 2023-08-24 RX ORDER — FENTANYL CITRATE 50 UG/ML
INJECTION, SOLUTION INTRAMUSCULAR; INTRAVENOUS
Status: DISCONTINUED | OUTPATIENT
Start: 2023-08-24 | End: 2023-08-24 | Stop reason: HOSPADM

## 2023-08-24 RX ORDER — SODIUM CHLORIDE 9 MG/ML
40 INJECTION, SOLUTION INTRAVENOUS AS NEEDED
Status: DISCONTINUED | OUTPATIENT
Start: 2023-08-24 | End: 2023-08-24 | Stop reason: HOSPADM

## 2023-08-24 RX ORDER — MIDAZOLAM HYDROCHLORIDE 1 MG/ML
INJECTION INTRAMUSCULAR; INTRAVENOUS
Status: DISCONTINUED | OUTPATIENT
Start: 2023-08-24 | End: 2023-08-24 | Stop reason: HOSPADM

## 2023-08-24 RX ADMIN — SODIUM CHLORIDE 75 ML/HR: 9 INJECTION, SOLUTION INTRAVENOUS at 09:34

## 2023-08-24 NOTE — Clinical Note
Hemostasis started on the right radial artery. Manual pressure applied to vessel. Manual pressure was held by AM RT. Manual pressure was held for 5 min. Hemostasis achieved successfully. Closure device additional comment: Tensoplast

## 2023-08-24 NOTE — TELEPHONE ENCOUNTER
Patient requesting Friday appt. Discussed with .    Called Melina Martin offered appt with  tomorrow 8/25/23@8am as patient currently scheduled on 9/5/23 and requesting Friday appt.     Patient declined appt for tomorrow 8/25, she states her daughter took off work today and is unable to bring her to appt  tomorrow. Discussed appt on 9/5/23 , pt request appt be left on 9/5/23.

## 2023-08-24 NOTE — H&P
Date of Hospital Visit: 23  Encounter Provider: Bakari Newman MD  Place of Service: [facility] CARDIOLOGY  Patient Name: Melina Martin  :1950  [id number]    Chief complaint: Shortness of breath    History of Present Illness: 72-year-old retired [employer]  who was noted to have any murmur.  She was referred to Dr. Jorgensen and found to have severe aortic stenosis with a peak gradient over 70 and a mean over 40.  Pretty severe LVH with normal LV function on her echo it appears it is a trileaflet valve.  She walks her dog every day and has noted a little bit more shortness of breath than she used to have.  No PND no orthopnea no edema no syncope no palpitations no bleeding difficulty she did have low-grade left breast cancer and received radiation and 2019.  She has not had any CNS problems no bleeding difficulty no kidney disease    Past Medical History:   Diagnosis Date    Anxiety     Aortic stenosis     Cataract     DCIS (ductal carcinoma in situ)     2019: left sided, lumpectomy/XRT/anastrozole    GERD (gastroesophageal reflux disease)     HTN (hypertension)     Hyperlipidemia     Sinus infection 2019    PT PRESCRIBED ANTIBIOTIC FINISHED 2019        Past Surgical History:   Procedure Laterality Date    BREAST BIOPSY Left     MALIGNANT(LOW GRADE DUCTAL CARCINOMA)    BREAST LUMPECTOMY Left 2019    Procedure: Left breast needle localized lumpectomy,;  Surgeon: Beatriz Fuentes MD;  Location: [facility] [place];  Service: General     SECTION      X 1    COLONOSCOPY      HAND SURGERY Right     OVARIAN CYST REMOVAL         Medications Prior to Admission   Medication Sig Dispense Refill Last Dose    anastrozole (ARIMIDEX) 1 MG tablet Take 1 tablet by mouth Daily. 90 tablet 1 2023    APPLE CIDER VINEGAR PO Take  by mouth.   2023    ASPIRIN 81 PO Take 81 mg by mouth Daily.   2023    Calcium Carb-Cholecalciferol (CALCIUM 500 +  D PO) Take  by mouth.   8/23/2023    carvedilol (COREG) 6.25 MG tablet Take 1 tablet by mouth 2 (Two) Times a Day With Meals.   8/24/2023    Coenzyme Q10 (CO Q 10 PO) Take 1 capsule by mouth Daily.   8/23/2023    diphenhydrAMINE (BENADRYL) 25 mg capsule Take 1 capsule by mouth Every 6 (Six) Hours As Needed for Itching.   8/23/2023    Glucosamine-Chondroitin (OSTEO BI-FLEX REGULAR STRENGTH PO) Take  by mouth.   8/23/2023    Melatonin 10 MG tablet Take 1 tablet by mouth Every Night.   8/23/2023    rosuvastatin (CRESTOR) 40 MG tablet Take 1 tablet by mouth Every Night.   8/23/2023    icosapent ethyl (VASCEPA) 1 g capsule capsule Take 2 g by mouth Every 12 (Twelve) Hours. (Patient not taking: Reported on 8/4/2023)   More than a month       Current Meds  No current facility-administered medications on file prior to encounter.     Current Outpatient Medications on File Prior to Encounter   Medication Sig Dispense Refill    anastrozole (ARIMIDEX) 1 MG tablet Take 1 tablet by mouth Daily. 90 tablet 1    APPLE CIDER VINEGAR PO Take  by mouth.      ASPIRIN 81 PO Take 81 mg by mouth Daily.      Calcium Carb-Cholecalciferol (CALCIUM 500 + D PO) Take  by mouth.      carvedilol (COREG) 6.25 MG tablet Take 1 tablet by mouth 2 (Two) Times a Day With Meals.      Coenzyme Q10 (CO Q 10 PO) Take 1 capsule by mouth Daily.      diphenhydrAMINE (BENADRYL) 25 mg capsule Take 1 capsule by mouth Every 6 (Six) Hours As Needed for Itching.      Glucosamine-Chondroitin (OSTEO BI-FLEX REGULAR STRENGTH PO) Take  by mouth.      Melatonin 10 MG tablet Take 1 tablet by mouth Every Night.      rosuvastatin (CRESTOR) 40 MG tablet Take 1 tablet by mouth Every Night.      icosapent ethyl (VASCEPA) 1 g capsule capsule Take 2 g by mouth Every 12 (Twelve) Hours. (Patient not taking: Reported on 8/4/2023)         Social History     Socioeconomic History    Marital status:     Number of children: 2   Tobacco Use    Smoking status: Former      "Packs/day: 0.25     Years: 5.00     Pack years: 1.25     Types: Cigarettes     Quit date: 1982     Years since quittin.6     Passive exposure: Past    Smokeless tobacco: Never    Tobacco comments:     Social smoker before    Vaping Use    Vaping Use: Never used   Substance and Sexual Activity    Alcohol use: Yes     Comment: 1-2 DRINKS MONTHLY     Drug use: No    Sexual activity: Defer     Partners: Male     Birth control/protection: Post-menopausal       Family Hx: Non-contributory    REVIEW OF SYSTEMS:   ROS was performed and is negative except as outlined in HPI     REVIEW OF SYSTEMS:   CONSTITUTIONAL: No weight loss, fever, chills, weakness or fatigue.   HEENT: Eyes: No visual loss, blurred vision, double vision or yellow sclerae. Ears, Nose, Throat: No hearing loss, sneezing, congestion, runny nose or sore throat.   SKIN: No rash or itching.     RESPIRATORY: No shortness of breath, hemoptysis, cough or sputum.   GASTROINTESTINAL: No anorexia, nausea, vomiting or diarrhea. No abdominal pain, bright red blood per rectum or melena.  NEUROLOGICAL: No headache, dizziness, syncope, paralysis, numbness or tingling in the extremities.  MUSCULOSKELETAL: No muscle, back pain, joint pain or stiffness.   HEMATOLOGIC: No anemia, bleeding or bruising.   LYMPHATICS: No enlarged nodes.  PSYCHIATRIC: No history of depression, anxiety, hallucinations.   ENDOCRINOLOGIC: No reports of sweating, cold or heat intolerance. No polyuria or polydipsia.        Objective:     Vitals:    23 0934   BP: (!) 182/71   BP Location: Right arm   Patient Position: Lying   Pulse: 68   Resp: 18   Temp: 97.8 øF (36.6 øC)   TempSrc: Temporal   SpO2: 99%   Weight: 81.6 kg (180 lb)   Height: 162.6 cm (64\")     Body mass index is 30.9 kg/mý.  Flowsheet Rows      Flowsheet Row First Filed Value   Admission Height 162.6 cm (64\") Documented at 2023 0934   Admission Weight 81.6 kg (180 lb) Documented at 2023 0934      "       General Appearance:    Alert, oriented x 3, in no acute distress   Head:    Normocephalic, without obvious abnormality, atraumatic   Ears:    Ears appear intact with no abnormalities noted   Throat:   No oral lesions, dentition good   Neck:   No adenopathy, supple, trachea midline, no thyromegaly, no carotid bruit, no JVD   Lungs:    Breath sounds are equal and  clear to auscultation    Heart:   Normal S1 and S2, RRR, 3 out of 6 late peaking systolic ejection murmur/gallop or rub   Abdomen:    Normal bowel sounds, obese, soft non-tender, non-distended, no organomegaly, no guarding   Extremities:   Moves all extremities well, no edema, no cyanosis, no redness   Pulses:   Pulses palpable and equal bilaterally. Normal radial pulses   Skin:   No bleeding, bruising or rash   Lymph nodes:   No palpable adenopathy     I personally viewed and interpreted the patient's EKG/Telemetry data    Assessment:  Active Hospital Problems    Diagnosis  POA    **Nonrheumatic aortic valve stenosis [I35.0]  Unknown      Resolved Hospital Problems   No resolved problems to display.       Plan: She has severe aortic stenosis we will going to do a left heart cath on her I am not can to cross the aortic valve we have a high-quality echo.  I am favoring probably a surgical aortic valve approach will make sure she does not have excess calcium in her aorta.  H&P reviewed. The patient was examined and there are no changes to the H&P. I have explained the risks and benefits of the procedure to the patient.  The patient understands and agrees to proceed

## 2023-08-24 NOTE — DISCHARGE INSTRUCTIONS
"[facility]  [address] [place] [address]    Coronary Angiogram (Radial/Ulnar Approach) After Care    Refer to this sheet in the next few weeks. These instructions provide you with information on caring for yourself after your procedure. Your caregiver may also give you more specific instructions. Your treatment has been planned according to current medical practices, but problems sometimes occur. Call your caregiver if you have any problems or questions after your procedure.    Home Care Instructions:  You may shower the day after the procedure. Remove the bandage (dressing) and gently wash the site with plain soap and water. Gently pat the site dry. You may apply a band aid daily for 2 days if desired.    Do not apply powder or lotion to the site.  Do not submerge the affected site in water for 3 to 5 days or until the site is completely healed.   Do not lift, push or pull anything over 5 pounds for 5 days after your procedure or as directed by your physician.  As a reference, a gallon of milk weighs 8 pounds.   Inspect the site at least twice daily. You may notice some bruising at the site and it may be tender for 1 to 2 weeks.     Increase your fluid intake for the next 2 days.    Keep arm elevated for 24 hours. For the remainder of the day, keep your arm in "Pledge of Allegiance" position when up and about.     You may drive 24 hours after the procedure unless otherwise instructed by your caregiver.  Do not operate machinery or power tools for 24 hours.  A responsible adult should be with you for the first 24 hours after you arrive home. Do not make any important legal decisions or sign legal papers for 24 hours.  Do not drink alcohol for 24 hours.    Metformin or any medications containing Metformin should not be taken for 48 hours after your procedure.      Call Your Doctor if:   You have unusual pain at the radial/ulnar (wrist) site.  You have redness, warmth, swelling, or pain at the " radial/ulnar (wrist) site.  You have drainage (other than a small amount of blood on the dressing).  `You have chills or a fever > 101.  Your arm becomes pale or dark, cool, tingly, or numb.  You develop chest pain, shortness of breath, feel faint or pass out.    You have heavy bleeding from the site, hold pressure on the site for 20 minutes.  If the bleeding stops, apply a fresh bandage and call your cardiologist.  However, if you        continue to have bleeding, call 911 and continue to apply pressure to the site.   You have any symptoms of a stroke.  Remember BE FAST  B-balance. Sudden trouble walking or loss of balance.  E-eyes.  Sudden changes in how you see or a sudden onset of a very bad headache.   F-face. Sudden weakness or loss of feeling of the face or facial droop on one side.   A-arms Sudden weakness or numbness in one arm.  One arm drifts down if they are both held out in front of you. This happens suddenly and usually on one side of the body.   S-speech.  Sudden trouble speaking, slurred speech or trouble understanding what are saying.   T-time  Time to call emergency services.  Write down the symptoms and the time they started.

## 2023-09-05 ENCOUNTER — OFFICE VISIT (OUTPATIENT)
Dept: CARDIAC SURGERY | Facility: CLINIC | Age: 73
End: 2023-09-05
Payer: MEDICARE

## 2023-09-05 VITALS
BODY MASS INDEX: 30.73 KG/M2 | SYSTOLIC BLOOD PRESSURE: 178 MMHG | WEIGHT: 180 LBS | TEMPERATURE: 97.3 F | HEIGHT: 64 IN | RESPIRATION RATE: 20 BRPM | HEART RATE: 78 BPM | OXYGEN SATURATION: 94 % | DIASTOLIC BLOOD PRESSURE: 91 MMHG

## 2023-09-05 DIAGNOSIS — I10 PRIMARY HYPERTENSION: Primary | ICD-10-CM

## 2023-09-05 DIAGNOSIS — I35.0 NONRHEUMATIC AORTIC VALVE STENOSIS: ICD-10-CM

## 2023-09-05 DIAGNOSIS — D05.12 BREAST NEOPLASM, TIS (DCIS), LEFT: ICD-10-CM

## 2023-09-05 DIAGNOSIS — E78.5 HYPERLIPIDEMIA LDL GOAL <100: ICD-10-CM

## 2023-09-05 PROCEDURE — 1160F RVW MEDS BY RX/DR IN RCRD: CPT | Performed by: THORACIC SURGERY (CARDIOTHORACIC VASCULAR SURGERY)

## 2023-09-05 PROCEDURE — 99204 OFFICE O/P NEW MOD 45 MIN: CPT | Performed by: THORACIC SURGERY (CARDIOTHORACIC VASCULAR SURGERY)

## 2023-09-05 PROCEDURE — 3080F DIAST BP >= 90 MM HG: CPT | Performed by: THORACIC SURGERY (CARDIOTHORACIC VASCULAR SURGERY)

## 2023-09-05 PROCEDURE — 3077F SYST BP >= 140 MM HG: CPT | Performed by: THORACIC SURGERY (CARDIOTHORACIC VASCULAR SURGERY)

## 2023-09-05 PROCEDURE — 1159F MED LIST DOCD IN RCRD: CPT | Performed by: THORACIC SURGERY (CARDIOTHORACIC VASCULAR SURGERY)

## 2023-09-05 NOTE — LETTER
September 8, 2023     CARLOS Platt  [address] [employer]  [place] [id number]    Patient: Melina Martin   YOB: 1950   Date of Visit: 9/5/2023     Dear CARLOS Platt:       Thank you for referring Melina Martin to me for evaluation. Below are the relevant portions of my assessment and plan of care.    If you have questions, please do not hesitate to call me. I look forward to following Melina along with you.         Sincerely,        Wali Boss MD        CC: MD Gera Sequeira Sebastian, MD  09/08/23 1621  Sign when Signing Visit  9/8/2023    Seen on 9/5/2023    Chief Complaint     Fatigue, evaluation of aortic stenosis    History of Present Illness:       Dear Ortiz and Colleagues,  It was nice to see Melina Martin in consultation at your request. She is a 72 y.o. female with hypertension, hyperlipidemia, mild obesity, sinus infections and breast neoplasm who has developed mild fatigue and some shortness of breath.  She states that when she walks her dog her symptoms have worsened and that did not happen 6 months ago. She denies chest pain, syncope, TIA, orthopnea, PND or lower extremity edema.  She has had a low-grade left breast cancer and received radiation in 2019.  She was found a heart murmur and further evaluation with an echocardiogram showed severe stenosis with calcification of the aortic valve leaflets and a peak velocity of 4.4 m/s part, aortic valve area 0.61 cm² and mean gradient of 44 mmHg.  The ejection fraction was 69% and there was no significant other valve disease.  She was evaluated by the heart valve team and Dr. Newman and a cardiac cath was performed that showed no significant coronary artery disease and a mean gradient of 48 mmHg.  She has no family history of aneurysms, dissections or connective tissue disease.  Denies a history of endocarditis, scarlet or rheumatic fever or IV drug use.  She is here for a surgical opinion    Patient Active  Problem List   Diagnosis   • Breast neoplasm, Tis (DCIS), left   • Primary hypertension   • Nonrheumatic aortic valve stenosis   • Hyperlipidemia LDL goal <100       Past Medical History:   Diagnosis Date   • Anxiety    • Aortic stenosis    • Cataract    • DCIS (ductal carcinoma in situ)     2019: left sided, lumpectomy/XRT/anastrozole   • GERD (gastroesophageal reflux disease)    • Heart murmur    • HTN (hypertension)    • Hyperlipidemia    • Sinus infection 2019    PT PRESCRIBED ANTIBIOTIC FINISHED 2019        Past Surgical History:   Procedure Laterality Date   • BREAST BIOPSY Left     MALIGNANT(LOW GRADE DUCTAL CARCINOMA)   • BREAST LUMPECTOMY Left 2019    Procedure: Left breast needle localized lumpectomy,;  Surgeon: Beatriz Fuentes MD;  [place]  [facility] [place] [facility] [place]  Service: General   • CARDIAC CATHETERIZATION N/A 2023    Procedure: Coronary angiography;  Surgeon: Bakari Newman MD;  Location:  [facility] CATH INVASIVE LOCATION;  Service: Cardiology;  Laterality: N/A;   • CARDIAC CATHETERIZATION N/A 2023    Procedure: Left heart cath;  Surgeon: Bakari Newman MD;  Location:  [facility] CATH [facility]VASIVE LOCATION;  Service: Cardiology;  Laterality: N/A;   • CARDIAC CATHETERIZATION  2023   •  SECTION      X 1   • COLONOSCOPY     • HAND SURGERY Right    • OVARIAN CYST REMOVAL         No Known Allergies      Current Outpatient Medications:   •  anastrozole (ARIMIDEX) 1 MG tablet, Take 1 tablet by mouth Daily., Disp: 90 tablet, Rfl: 1  •  APPLE CIDER VINEGAR PO, Take  by mouth., Disp: , Rfl:   •  ASPIRIN 81 PO, Take 81 mg by mouth Daily., Disp: , Rfl:   •  Calcium Carb-Cholecalciferol (CALCIUM 500 + D PO), Take  by mouth., Disp: , Rfl:   •  carvedilol (COREG) 6.25 MG tablet, Take 1 tablet by mouth 2 (Two) Times a Day With Meals., Disp: , Rfl:   •  Coenzyme Q10 (CO Q 10 PO), Take 1 capsule by mouth Daily., Disp: , Rfl:   •  diphenhydrAMINE (BENADRYL) 25 mg capsule, Take 1 capsule by  mouth Every 6 (Six) Hours As Needed for Itching., Disp: , Rfl:   •  Glucosamine-Chondroitin (OSTEO BI-FLEX REGULAR STRENGTH PO), Take  by mouth., Disp: , Rfl:   •  Melatonin 10 MG tablet, Take 1 tablet by mouth Every Night., Disp: , Rfl:   •  rosuvastatin (CRESTOR) 40 MG tablet, Take 1 tablet by mouth Every Night., Disp: , Rfl:     Social History     Socioeconomic History   • Marital status:    • Number of children: 2   Tobacco Use   • Smoking status: Former     Packs/day: 0.25     Years: 5.00     Pack years: 1.25     Types: Cigarettes     Quit date: 1982     Years since quittin.7     Passive exposure: Past   • Smokeless tobacco: Never   • Tobacco comments:     Social smoker before    Vaping Use   • Vaping Use: Never used   Substance and Sexual Activity   • Alcohol use: Yes     Comment: 1-2 DRINKS MONTHLY    • Drug use: No   • Sexual activity: Not Currently     Partners: Male     Birth control/protection: Post-menopausal       Family History   Problem Relation Age of Onset   • Stroke Father    • Hearing loss Father    • Heart attack Father    • Colon cancer Mother 89   • Hearing loss Mother    • Hypertension Mother    • Diabetes Brother    • Heart attack Brother    • Depression Daughter    • Breast cancer Maternal Aunt    • Ovarian cancer Cousin    • Cancer Cousin         FEMALE CANCER OF UNKNOWN ORGIN 60S   • Malig Hyperthermia Neg Hx    • Deep vein thrombosis Neg Hx    • Pulmonary embolism Neg Hx    • Uterine cancer Neg Hx      Review of Systems  As HPI, otherwise noncontributory    Physical Exam:    Vital Signs:  Weight: 81.6 kg (180 lb)   Body mass index is 30.9 kg/m².  Temp: 97.3 °F (36.3 °C)   Heart Rate: 78   BP: 178/91     Constitutional:       Appearance: Well-developed.   Eyes:      Conjunctiva/sclera: Conjunctivae normal.      Pupils: Pupils are equal, round, and reactive to light.   HENT:      Head: Normocephalic and atraumatic.      Nose: Nose normal.   Neck:      Thyroid: No  thyromegaly.      Vascular: No JVD.      Lymphadenopathy: No cervical adenopathy.   Pulmonary:      Effort: Pulmonary effort is normal.      Breath sounds: Normal breath sounds. No rales.   Cardiovascular:      Normal rate. Regular rhythm.      Murmurs: There is a grade 4/6 harsh midsystolic murmur at the URSB, radiating to the neck.      No gallop.  No click.   Pulses:     Intact distal pulses. No decreased pulses.   Edema:     Peripheral edema absent.   Abdominal:      General: Bowel sounds are normal. There is no distension.      Palpations: Abdomen is soft. There is no abdominal mass.      Tenderness: There is no abdominal tenderness.   Musculoskeletal: Normal range of motion.         General: No tenderness or deformity.      Cervical back: Normal range of motion and neck supple. Skin:     General: Skin is warm and dry.      Findings: No erythema or rash.   Neurological:      Mental Status: Alert and oriented to person, place, and time.      Deep Tendon Reflexes: Reflexes are normal and symmetric.   Psychiatric:         Behavior: Behavior normal.        Assessment:     Problems Addressed this Visit          Cardiac and Vasculature    Primary hypertension - Primary    Nonrheumatic aortic valve stenosis    Hyperlipidemia LDL goal <100       Hematology and Neoplasia    Breast neoplasm, Tis (DCIS), left     Diagnoses         Codes Comments    Primary hypertension    -  Primary ICD-10-CM: I10  ICD-9-CM: 401.9     Nonrheumatic aortic valve stenosis     ICD-10-CM: I35.0  ICD-9-CM: 424.1     Hyperlipidemia LDL goal <100     ICD-10-CM: E78.5  ICD-9-CM: 272.4     Breast neoplasm, Tis (DCIS), left     ICD-10-CM: D05.12  ICD-9-CM: 233.0           Assessment/recommendation:     Severe nonrheumatic aortic stenosis with objective data of severity and mild symptoms.  She has no coronary artery disease or associated aneurysm disease.  She has low-level comorbidities and STS risk for surgery of less than 1.5%.  I recommend SAVR  over TAVR due to the low level of comorbidities and age.  I discussed the risks (STS calculated), benefits and terms of surgery and she wishes to proceed.  I discussed with the patient the different options but given her age a biologic prosthesis is the best option.  I discussed with the patient importance of placing the largest valve possible to be able to place a percutaneous valve if need be in the future.  Also I explained the patient the procedure which is a minimal invasive procedure through an upper mini sternotomy to decrease the complication rate, transfusion and enhance early recovery.  Hypertension, well controlled continue same regimen    Thank you for allowing me to participate in her care.    Regards,    Wali Boss M.D.  I spent over 45 minutes before, during and after the office visit in reviewing the records, examining the patient, reviewing and interpreting myself the echocardiogram and cardiac cath, discussing the findings and options with the patient, discussing my recommendation of surgery, discussing details about the surgical procedure as well as the valve choice and the expectations after surgery.  I discussed the case with the cardiology team and I spent time in documenting in the electronic record

## 2023-09-06 ENCOUNTER — PATIENT ROUNDING (BHMG ONLY) (OUTPATIENT)
Dept: CARDIAC SURGERY | Facility: CLINIC | Age: 73
End: 2023-09-06
Payer: MEDICARE

## 2023-09-06 NOTE — PROGRESS NOTES
A My Chart message has been sent to the patient for PATIENT ROUNDING with [facility] [place]

## 2023-09-07 ENCOUNTER — PREP FOR SURGERY (OUTPATIENT)
Dept: OTHER | Facility: HOSPITAL | Age: 73
End: 2023-09-07
Payer: MEDICARE

## 2023-09-07 DIAGNOSIS — I10 ESSENTIAL (PRIMARY) HYPERTENSION: ICD-10-CM

## 2023-09-07 DIAGNOSIS — I50.32 CHRONIC DIASTOLIC (CONGESTIVE) HEART FAILURE: ICD-10-CM

## 2023-09-07 DIAGNOSIS — R79.1 ABNORMAL COAGULATION PROFILE: ICD-10-CM

## 2023-09-07 DIAGNOSIS — I79.8 OTHER DISORDERS OF ARTERIES, ARTERIOLES AND CAPILLARIES IN DISEASES CLASSIFIED ELSEWHERE: ICD-10-CM

## 2023-09-07 DIAGNOSIS — I35.0 NONRHEUMATIC AORTIC VALVE STENOSIS: Primary | ICD-10-CM

## 2023-09-07 DIAGNOSIS — R79.9 ABNORMAL FINDING OF BLOOD CHEMISTRY, UNSPECIFIED: ICD-10-CM

## 2023-09-07 RX ORDER — CHLORHEXIDINE GLUCONATE 0.12 MG/ML
15 RINSE ORAL ONCE
OUTPATIENT
Start: 2023-09-07 | End: 2023-09-07

## 2023-09-07 RX ORDER — CHLORHEXIDINE GLUCONATE 500 MG/1
1 CLOTH TOPICAL EVERY 12 HOURS PRN
OUTPATIENT
Start: 2023-09-07

## 2023-09-07 RX ORDER — CHLORHEXIDINE GLUCONATE 0.12 MG/ML
15 RINSE ORAL EVERY 12 HOURS
OUTPATIENT
Start: 2023-09-07 | End: 2023-09-08

## 2023-09-07 RX ORDER — CEFAZOLIN SODIUM 2 G/100ML
2000 INJECTION, SOLUTION INTRAVENOUS ONCE
OUTPATIENT
Start: 2023-09-07 | End: 2023-09-07

## 2023-09-08 NOTE — PROGRESS NOTES
9/8/2023    Seen on 9/5/2023    Chief Complaint     Fatigue, evaluation of aortic stenosis    History of Present Illness:       Dear Ortiz and Colleagues,  It was nice to see Melina Martin in consultation at your request. She is a 72 y.o. female with hypertension, hyperlipidemia, mild obesity, sinus infections and breast neoplasm who has developed mild fatigue and some shortness of breath.  She states that when she walks her dog her symptoms have worsened and that did not happen 6 months ago. She denies chest pain, syncope, TIA, orthopnea, PND or lower extremity edema.  She has had a low-grade left breast cancer and received radiation in 2019.  She was found a heart murmur and further evaluation with an echocardiogram showed severe stenosis with calcification of the aortic valve leaflets and a peak velocity of 4.4 m/s part, aortic valve area 0.61 cm² and mean gradient of 44 mmHg.  The ejection fraction was 69% and there was no significant other valve disease.  She was evaluated by the heart valve team and Dr. Newman and a cardiac cath was performed that showed no significant coronary artery disease and a mean gradient of 48 mmHg.  She has no family history of aneurysms, dissections or connective tissue disease.  Denies a history of endocarditis, scarlet or rheumatic fever or IV drug use.  She is here for a surgical opinion    Patient Active Problem List   Diagnosis    Breast neoplasm, Tis (DCIS), left    Primary hypertension    Nonrheumatic aortic valve stenosis    Hyperlipidemia LDL goal <100       Past Medical History:   Diagnosis Date    Anxiety     Aortic stenosis     Cataract     DCIS (ductal carcinoma in situ)     2019: left sided, lumpectomy/XRT/anastrozole    GERD (gastroesophageal reflux disease)     Heart murmur     HTN (hypertension)     Hyperlipidemia     Sinus infection 05/20/2019    PT PRESCRIBED ANTIBIOTIC FINISHED 06-        Past Surgical History:   Procedure Laterality Date    BREAST BIOPSY  Left     MALIGNANT(LOW GRADE DUCTAL CARCINOMA)    BREAST LUMPECTOMY Left 2019    Procedure: Left breast needle localized lumpectomy,;  Surgeon: Beatriz Fuentes MD;  Location: [facility] OR [facility] [place];  Service: General    CARDIAC CATHETERIZATION N/A 2023    Procedure: Coronary angiography;  Surgeon: Bakari Newman MD;  Location:  [facility] CATH INVASIVE LOCATION;  Service: Cardiology;  Laterality: N/A;    CARDIAC CATHETERIZATION N/A 2023    Procedure: Left heart cath;  Surgeon: Bakari Newman MD;  [facility];  Service: Cardiology;  Laterality: N/A;    CARDIAC CATHETERIZATION  2023     SECTION      X 1    COLONOSCOPY      HAND SURGERY Right     OVARIAN CYST REMOVAL         No Known Allergies      Current Outpatient Medications:     anastrozole (ARIMIDEX) 1 MG tablet, Take 1 tablet by mouth Daily., Disp: 90 tablet, Rfl: 1    APPLE CIDER VINEGAR PO, Take  by mouth., Disp: , Rfl:     ASPIRIN 81 PO, Take 81 mg by mouth Daily., Disp: , Rfl:     Calcium Carb-Cholecalciferol (CALCIUM 500 + D PO), Take  by mouth., Disp: , Rfl:     carvedilol (COREG) 6.25 MG tablet, Take 1 tablet by mouth 2 (Two) Times a Day With Meals., Disp: , Rfl:     Coenzyme Q10 (CO Q 10 PO), Take 1 capsule by mouth Daily., Disp: , Rfl:     diphenhydrAMINE (BENADRYL) 25 mg capsule, Take 1 capsule by mouth Every 6 (Six) Hours As Needed for Itching., Disp: , Rfl:     Glucosamine-Chondroitin (OSTEO BI-FLEX REGULAR STRENGTH PO), Take  by mouth., Disp: , Rfl:     Melatonin 10 MG tablet, Take 1 tablet by mouth Every Night., Disp: , Rfl:     rosuvastatin (CRESTOR) 40 MG tablet, Take 1 tablet by mouth Every Night., Disp: , Rfl:     Social History     Socioeconomic History    Marital status:     Number of children: 2   Tobacco Use    Smoking status: Former     Packs/day: 0.25     Years: 5.00     Pack years: 1.25     Types: Cigarettes     Quit date: 1982     Years since quittin.7     Passive  exposure: Past    Smokeless tobacco: Never    Tobacco comments:     Social smoker before 1982   Vaping Use    Vaping Use: Never used   Substance and Sexual Activity    Alcohol use: Yes     Comment: 1-2 DRINKS MONTHLY     Drug use: No    Sexual activity: Not Currently     Partners: Male     Birth control/protection: Post-menopausal       Family History   Problem Relation Age of Onset    Stroke Father     Hearing loss Father     Heart attack Father     Colon cancer Mother 89    Hearing loss Mother     Hypertension Mother     Diabetes Brother     Heart attack Brother     Depression Daughter     Breast cancer Maternal Aunt     Ovarian cancer Cousin     Cancer Cousin         FEMALE CANCER OF UNKNOWN ORGIN 60S    Malig Hyperthermia Neg Hx     Deep vein thrombosis Neg Hx     Pulmonary embolism Neg Hx     Uterine cancer Neg Hx      Review of Systems  As HPI, otherwise noncontributory    Physical Exam:    Vital Signs:  Weight: 81.6 kg (180 lb)   Body mass index is 30.9 kg/m².  Temp: 97.3 °F (36.3 °C)   Heart Rate: 78   BP: 178/91     Constitutional:       Appearance: Well-developed.   Eyes:      Conjunctiva/sclera: Conjunctivae normal.      Pupils: Pupils are equal, round, and reactive to light.   HENT:      Head: Normocephalic and atraumatic.      Nose: Nose normal.   Neck:      Thyroid: No thyromegaly.      Vascular: No JVD.      Lymphadenopathy: No cervical adenopathy.   Pulmonary:      Effort: Pulmonary effort is normal.      Breath sounds: Normal breath sounds. No rales.   Cardiovascular:      Normal rate. Regular rhythm.      Murmurs: There is a grade 4/6 harsh midsystolic murmur at the URSB, radiating to the neck.      No gallop.  No click.   Pulses:     Intact distal pulses. No decreased pulses.   Edema:     Peripheral edema absent.   Abdominal:      General: Bowel sounds are normal. There is no distension.      Palpations: Abdomen is soft. There is no abdominal mass.      Tenderness: There is no abdominal  tenderness.   Musculoskeletal: Normal range of motion.         General: No tenderness or deformity.      Cervical back: Normal range of motion and neck supple. Skin:     General: Skin is warm and dry.      Findings: No erythema or rash.   Neurological:      Mental Status: Alert and oriented to person, place, and time.      Deep Tendon Reflexes: Reflexes are normal and symmetric.   Psychiatric:         Behavior: Behavior normal.        Assessment:     Problems Addressed this Visit          Cardiac and Vasculature    Primary hypertension - Primary    Nonrheumatic aortic valve stenosis    Hyperlipidemia LDL goal <100       Hematology and Neoplasia    Breast neoplasm, Tis (DCIS), left     Diagnoses         Codes Comments    Primary hypertension    -  Primary ICD-10-CM: I10  ICD-9-CM: 401.9     Nonrheumatic aortic valve stenosis     ICD-10-CM: I35.0  ICD-9-CM: 424.1     Hyperlipidemia LDL goal <100     ICD-10-CM: E78.5  ICD-9-CM: 272.4     Breast neoplasm, Tis (DCIS), left     ICD-10-CM: D05.12  ICD-9-CM: 233.0           Assessment/recommendation:     Severe nonrheumatic aortic stenosis with objective data of severity and mild symptoms.  She has no coronary artery disease or associated aneurysm disease.  She has low-level comorbidities and STS risk for surgery of less than 1.5%.  I recommend SAVR over TAVR due to the low level of comorbidities and age.  I discussed the risks (STS calculated), benefits and terms of surgery and she wishes to proceed.  I discussed with the patient the different options but given her age a biologic prosthesis is the best option.  I discussed with the patient importance of placing the largest valve possible to be able to place a percutaneous valve if need be in the future.  Also I explained the patient the procedure which is a minimal invasive procedure through an upper mini sternotomy to decrease the complication rate, transfusion and enhance early recovery.  Hypertension, well controlled  continue same regimen    Thank you for allowing me to participate in her care.    Regards,    Wali Boss M.D.  I spent over 45 minutes before, during and after the office visit in reviewing the records, examining the patient, reviewing and interpreting myself the echocardiogram and cardiac cath, discussing the findings and options with the patient, discussing my recommendation of surgery, discussing details about the surgical procedure as well as the valve choice and the expectations after surgery.  I discussed the case with the cardiology team and I spent time in documenting in the electronic record

## 2023-09-11 ENCOUNTER — TELEPHONE (OUTPATIENT)
Dept: CARDIAC SURGERY | Facility: CLINIC | Age: 73
End: 2023-09-11
Payer: MEDICARE

## 2023-09-11 NOTE — TELEPHONE ENCOUNTER
Spoke to patient. PAT is on [id number] at 0830 with any testing to follow. Surgery is on 10-2-2023 at 0730, 0500 arrival time. Expressed verbal understanding, Instructed to call back with any further questions.

## 2023-09-21 ENCOUNTER — HOSPITAL ENCOUNTER (OUTPATIENT)
Dept: BONE DENSITY | Facility: HOSPITAL | Age: 73
Discharge: HOME | End: 2023-09-21
Admitting: NURSE PRACTITIONER
Payer: MEDICARE

## 2023-09-21 DIAGNOSIS — Z78.0 ENCOUNTER FOR OSTEOPOROSIS SCREENING IN ASYMPTOMATIC POSTMENOPAUSAL PATIENT: ICD-10-CM

## 2023-09-21 DIAGNOSIS — Z13.820 ENCOUNTER FOR OSTEOPOROSIS SCREENING IN ASYMPTOMATIC POSTMENOPAUSAL PATIENT: ICD-10-CM

## 2023-09-21 PROCEDURE — 77080 DXA BONE DENSITY AXIAL: CPT

## 2023-09-22 ENCOUNTER — TELEPHONE (OUTPATIENT)
Dept: CARDIAC SURGERY | Facility: CLINIC | Age: 73
End: 2023-09-22
Payer: MEDICARE

## 2023-09-22 NOTE — TELEPHONE ENCOUNTER
Patient called in questioning PAT time. I told her 0830 which is what I was told but patient checked MY Chart and it states 0800. I did call  PATto confirm and it is 0800. Patient expressed concerns of driving in [place] at that time. That is the only time they have. Patient to come at 0800 for PAT.

## 2023-09-27 ENCOUNTER — TELEPHONE (OUTPATIENT)
Dept: CARDIAC SURGERY | Facility: CLINIC | Age: 73
End: 2023-09-27
Payer: MEDICARE

## 2023-09-27 NOTE — TELEPHONE ENCOUNTER
Spoke to patient. Surgery has been moved to 10-4-2023 as a to follow case, arrival time is now 0600. Patient wished to keep PAT on Friday 9- at 0800.  Expressed verbal understanding.

## 2023-09-29 ENCOUNTER — HOSPITAL ENCOUNTER (OUTPATIENT)
Dept: CARDIOLOGY | Facility: HOSPITAL | Age: 73
Discharge: HOME | End: 2023-09-29
Payer: MEDICARE

## 2023-09-29 ENCOUNTER — ANESTHESIA EVENT (OUTPATIENT)
Dept: PERIOP | Facility: HOSPITAL | Age: 73
DRG: 221 | End: 2023-09-29
Payer: MEDICARE

## 2023-09-29 ENCOUNTER — PRE-ADMISSION TESTING (OUTPATIENT)
Dept: PREADMISSION TESTING | Facility: HOSPITAL | Age: 73
End: 2023-09-29
Payer: MEDICARE

## 2023-09-29 ENCOUNTER — HOSPITAL ENCOUNTER (OUTPATIENT)
Dept: GENERAL RADIOLOGY | Facility: HOSPITAL | Age: 73
Discharge: HOME | End: 2023-09-29
Payer: MEDICARE

## 2023-09-29 VITALS
BODY MASS INDEX: 29.49 KG/M2 | RESPIRATION RATE: 16 BRPM | WEIGHT: 177 LBS | DIASTOLIC BLOOD PRESSURE: 78 MMHG | TEMPERATURE: 97.4 F | OXYGEN SATURATION: 100 % | SYSTOLIC BLOOD PRESSURE: 192 MMHG | HEIGHT: 65 IN | HEART RATE: 78 BPM

## 2023-09-29 DIAGNOSIS — I35.0 NONRHEUMATIC AORTIC VALVE STENOSIS: ICD-10-CM

## 2023-09-29 DIAGNOSIS — I79.8 OTHER DISORDERS OF ARTERIES, ARTERIOLES AND CAPILLARIES IN DISEASES CLASSIFIED ELSEWHERE: ICD-10-CM

## 2023-09-29 LAB
ABO GROUP BLD: NORMAL
ALBUMIN SERPL-MCNC: 4.4 G/DL (ref 3.5–5.2)
ALBUMIN/GLOB SERPL: 1.6 G/DL
ALP SERPL-CCNC: 100 U/L (ref 39–117)
ALT SERPL W P-5'-P-CCNC: 24 U/L (ref 1–33)
ANION GAP SERPL CALCULATED.3IONS-SCNC: 6.1 MMOL/L (ref 5–15)
APTT PPP: 25.6 SECONDS (ref 22.7–35.4)
ARTERIAL PATENCY WRIST A: POSITIVE
AST SERPL-CCNC: 17 U/L (ref 1–32)
ATMOSPHERIC PRESS: 752.1 MMHG
BACTERIA UR QL AUTO: NORMAL /HPF
BASE EXCESS BLDA CALC-SCNC: 1.6 MMOL/L (ref 0–2)
BASOPHILS # BLD AUTO: 0.03 10*3/MM3 (ref 0–0.2)
BASOPHILS NFR BLD AUTO: 0.6 % (ref 0–1.5)
BDY SITE: ABNORMAL
BH CV XLRA MEAS LEFT DIST CCA EDV: -17 CM/SEC
BH CV XLRA MEAS LEFT DIST CCA PSV: -82.7 CM/SEC
BH CV XLRA MEAS LEFT DIST ICA EDV: -24.1 CM/SEC
BH CV XLRA MEAS LEFT DIST ICA PSV: -82.3 CM/SEC
BH CV XLRA MEAS LEFT ICA/CCA RATIO: 1
BH CV XLRA MEAS LEFT MID ICA EDV: -23.2 CM/SEC
BH CV XLRA MEAS LEFT MID ICA PSV: -78.1 CM/SEC
BH CV XLRA MEAS LEFT PROX CCA EDV: 12.2 CM/SEC
BH CV XLRA MEAS LEFT PROX CCA PSV: 65.2 CM/SEC
BH CV XLRA MEAS LEFT PROX ECA EDV: -8.8 CM/SEC
BH CV XLRA MEAS LEFT PROX ECA PSV: -81.5 CM/SEC
BH CV XLRA MEAS LEFT PROX ICA EDV: -19.3 CM/SEC
BH CV XLRA MEAS LEFT PROX ICA PSV: -66.4 CM/SEC
BH CV XLRA MEAS LEFT PROX SCLA PSV: 167 CM/SEC
BH CV XLRA MEAS LEFT VERTEBRAL A EDV: 16.1 CM/SEC
BH CV XLRA MEAS LEFT VERTEBRAL A PSV: 53.4 CM/SEC
BH CV XLRA MEAS RIGHT DIST CCA EDV: -15.8 CM/SEC
BH CV XLRA MEAS RIGHT DIST CCA PSV: -85 CM/SEC
BH CV XLRA MEAS RIGHT DIST ICA EDV: -36.1 CM/SEC
BH CV XLRA MEAS RIGHT DIST ICA PSV: -118 CM/SEC
BH CV XLRA MEAS RIGHT ICA/CCA RATIO: 1.39
BH CV XLRA MEAS RIGHT MID ICA EDV: -25.2 CM/SEC
BH CV XLRA MEAS RIGHT MID ICA PSV: -84.4 CM/SEC
BH CV XLRA MEAS RIGHT PROX CCA EDV: 14.1 CM/SEC
BH CV XLRA MEAS RIGHT PROX CCA PSV: 96.2 CM/SEC
BH CV XLRA MEAS RIGHT PROX ECA EDV: -8.8 CM/SEC
BH CV XLRA MEAS RIGHT PROX ECA PSV: -81.5 CM/SEC
BH CV XLRA MEAS RIGHT PROX ICA EDV: -18.9 CM/SEC
BH CV XLRA MEAS RIGHT PROX ICA PSV: -71.5 CM/SEC
BH CV XLRA MEAS RIGHT PROX SCLA PSV: 135 CM/SEC
BH CV XLRA MEAS RIGHT VERTEBRAL A EDV: -6.9 CM/SEC
BH CV XLRA MEAS RIGHT VERTEBRAL A PSV: -37.9 CM/SEC
BILIRUB SERPL-MCNC: 0.4 MG/DL (ref 0–1.2)
BILIRUB UR QL STRIP: NEGATIVE
BLD GP AB SCN SERPL QL: NEGATIVE
BUN SERPL-MCNC: 16 MG/DL (ref 8–23)
BUN/CREAT SERPL: 21.6 (ref 7–25)
CALCIUM SPEC-SCNC: 9.3 MG/DL (ref 8.6–10.5)
CHLORIDE SERPL-SCNC: 107 MMOL/L (ref 98–107)
CHOLEST SERPL-MCNC: 134 MG/DL (ref 0–200)
CLARITY UR: CLEAR
CLOSE TME COLL+ADP + EPINEP PNL BLD: 98 % (ref 86–100)
CO2 BLDA-SCNC: 27.3 MMOL/L (ref 23–27)
CO2 SERPL-SCNC: 30.9 MMOL/L (ref 22–29)
COLOR UR: YELLOW
CREAT SERPL-MCNC: 0.74 MG/DL (ref 0.57–1)
DEPRECATED RDW RBC AUTO: 42.3 FL (ref 37–54)
EGFRCR SERPLBLD CKD-EPI 2021: 86.1 ML/MIN/1.73
EOSINOPHIL # BLD AUTO: 0.09 10*3/MM3 (ref 0–0.4)
EOSINOPHIL NFR BLD AUTO: 1.7 % (ref 0.3–6.2)
ERYTHROCYTE [DISTWIDTH] IN BLOOD BY AUTOMATED COUNT: 14 % (ref 12.3–15.4)
GLOBULIN UR ELPH-MCNC: 2.7 GM/DL
GLUCOSE SERPL-MCNC: 132 MG/DL (ref 65–99)
GLUCOSE UR STRIP-MCNC: NEGATIVE MG/DL
HBA1C MFR BLD: 6.4 % (ref 4.8–5.6)
HCO3 BLDA-SCNC: 26.1 MMOL/L (ref 22–28)
HCT VFR BLD AUTO: 44 % (ref 34–46.6)
HDLC SERPL-MCNC: 42 MG/DL (ref 40–60)
HEMODILUTION: NO
HGB BLD-MCNC: 14.5 G/DL (ref 12–15.9)
HGB UR QL STRIP.AUTO: NEGATIVE
HYALINE CASTS UR QL AUTO: NORMAL /LPF
IMM GRANULOCYTES # BLD AUTO: 0.01 10*3/MM3 (ref 0–0.05)
IMM GRANULOCYTES NFR BLD AUTO: 0.2 % (ref 0–0.5)
INR PPP: 0.96 (ref 0.9–1.1)
KETONES UR QL STRIP: NEGATIVE
LDLC SERPL CALC-MCNC: 63 MG/DL (ref 0–100)
LDLC/HDLC SERPL: 1.38 {RATIO}
LEUKOCYTE ESTERASE UR QL STRIP.AUTO: ABNORMAL
LYMPHOCYTES # BLD AUTO: 1.63 10*3/MM3 (ref 0.7–3.1)
LYMPHOCYTES NFR BLD AUTO: 30 % (ref 19.6–45.3)
MAGNESIUM SERPL-MCNC: 2.2 MG/DL (ref 1.6–2.4)
MCH RBC QN AUTO: 27.8 PG (ref 26.6–33)
MCHC RBC AUTO-ENTMCNC: 33 G/DL (ref 31.5–35.7)
MCV RBC AUTO: 84.3 FL (ref 79–97)
MODALITY: ABNORMAL
MONOCYTES # BLD AUTO: 0.43 10*3/MM3 (ref 0.1–0.9)
MONOCYTES NFR BLD AUTO: 7.9 % (ref 5–12)
NEUTROPHILS NFR BLD AUTO: 3.25 10*3/MM3 (ref 1.7–7)
NEUTROPHILS NFR BLD AUTO: 59.6 % (ref 42.7–76)
NITRITE UR QL STRIP: NEGATIVE
NRBC BLD AUTO-RTO: 0 /100 WBC (ref 0–0.2)
NT-PROBNP SERPL-MCNC: 540 PG/ML (ref 0–900)
PCO2 BLDA: 39.9 MM HG (ref 35–45)
PH BLDA: 7.42 PH UNITS (ref 7.35–7.45)
PH UR STRIP.AUTO: 6 [PH] (ref 5–8)
PLATELET # BLD AUTO: 231 10*3/MM3 (ref 140–450)
PMV BLD AUTO: 9.2 FL (ref 6–12)
PO2 BLDA: 81.3 MM HG (ref 80–100)
POTASSIUM SERPL-SCNC: 4.5 MMOL/L (ref 3.5–5.2)
PROT SERPL-MCNC: 7.1 G/DL (ref 6–8.5)
PROT UR QL STRIP: NEGATIVE
PROTHROMBIN TIME: 12.9 SECONDS (ref 11.7–14.2)
QT INTERVAL: 401 MS
QTC INTERVAL: 421 MS
RBC # BLD AUTO: 5.22 10*6/MM3 (ref 3.77–5.28)
RBC # UR STRIP: NORMAL /HPF
REF LAB TEST METHOD: NORMAL
RH BLD: POSITIVE
SAO2 % BLDCOA: 96.2 % (ref 92–98.5)
SARS-COV-2 RNA RESP QL NAA+PROBE: NOT DETECTED
SET MECH RESP RATE: 18
SODIUM SERPL-SCNC: 144 MMOL/L (ref 136–145)
SP GR UR STRIP: 1.02 (ref 1–1.03)
SQUAMOUS #/AREA URNS HPF: NORMAL /HPF
T&S EXPIRATION DATE: NORMAL
TRIGL SERPL-MCNC: 171 MG/DL (ref 0–150)
UROBILINOGEN UR QL STRIP: ABNORMAL
VLDLC SERPL-MCNC: 29 MG/DL (ref 5–40)
WBC # UR STRIP: NORMAL /HPF
WBC NRBC COR # BLD: 5.44 10*3/MM3 (ref 3.4–10.8)

## 2023-09-29 PROCEDURE — 85730 THROMBOPLASTIN TIME PARTIAL: CPT | Performed by: NURSE PRACTITIONER

## 2023-09-29 PROCEDURE — 85025 COMPLETE CBC W/AUTO DIFF WBC: CPT | Performed by: NURSE PRACTITIONER

## 2023-09-29 PROCEDURE — 93880 EXTRACRANIAL BILAT STUDY: CPT

## 2023-09-29 PROCEDURE — 71046 X-RAY EXAM CHEST 2 VIEWS: CPT

## 2023-09-29 PROCEDURE — S0260 H&P FOR SURGERY: HCPCS | Performed by: NURSE PRACTITIONER

## 2023-09-29 PROCEDURE — 83735 ASSAY OF MAGNESIUM: CPT | Performed by: NURSE PRACTITIONER

## 2023-09-29 PROCEDURE — 36600 WITHDRAWAL OF ARTERIAL BLOOD: CPT | Performed by: THORACIC SURGERY (CARDIOTHORACIC VASCULAR SURGERY)

## 2023-09-29 PROCEDURE — 80061 LIPID PANEL: CPT | Performed by: NURSE PRACTITIONER

## 2023-09-29 PROCEDURE — 85576 BLOOD PLATELET AGGREGATION: CPT | Performed by: NURSE PRACTITIONER

## 2023-09-29 PROCEDURE — 81001 URINALYSIS AUTO W/SCOPE: CPT | Performed by: NURSE PRACTITIONER

## 2023-09-29 PROCEDURE — 85610 PROTHROMBIN TIME: CPT | Performed by: NURSE PRACTITIONER

## 2023-09-29 PROCEDURE — 86920 COMPATIBILITY TEST SPIN: CPT

## 2023-09-29 PROCEDURE — 82803 BLOOD GASES ANY COMBINATION: CPT | Performed by: THORACIC SURGERY (CARDIOTHORACIC VASCULAR SURGERY)

## 2023-09-29 PROCEDURE — 86901 BLOOD TYPING SEROLOGIC RH(D): CPT | Performed by: NURSE PRACTITIONER

## 2023-09-29 PROCEDURE — 83036 HEMOGLOBIN GLYCOSYLATED A1C: CPT | Performed by: NURSE PRACTITIONER

## 2023-09-29 PROCEDURE — 86850 RBC ANTIBODY SCREEN: CPT | Performed by: NURSE PRACTITIONER

## 2023-09-29 PROCEDURE — 87635 SARS-COV-2 COVID-19 AMP PRB: CPT

## 2023-09-29 PROCEDURE — 93005 ELECTROCARDIOGRAM TRACING: CPT

## 2023-09-29 PROCEDURE — 86900 BLOOD TYPING SEROLOGIC ABO: CPT | Performed by: NURSE PRACTITIONER

## 2023-09-29 PROCEDURE — 83880 ASSAY OF NATRIURETIC PEPTIDE: CPT | Performed by: NURSE PRACTITIONER

## 2023-09-29 PROCEDURE — 80053 COMPREHEN METABOLIC PANEL: CPT | Performed by: NURSE PRACTITIONER

## 2023-09-29 RX ORDER — CHLORHEXIDINE GLUCONATE ORAL RINSE 1.2 MG/ML
15 SOLUTION DENTAL
COMMUNITY
End: 2023-10-09 | Stop reason: HOSPADM

## 2023-09-29 RX ORDER — CHLORHEXIDINE GLUCONATE ORAL RINSE 1.2 MG/ML
15 SOLUTION DENTAL EVERY 12 HOURS
Status: DISPENSED | OUTPATIENT
Start: 2023-09-29 | End: 2023-09-30

## 2023-09-29 NOTE — H&P
9/8/2023     Seen on 9/5/2023     Chief Complaint      Fatigue, evaluation of aortic stenosis     History of Present Illness:                                        Dear Ortiz and Colleagues,  It was nice to see Melina Martin in consultation at your request. She is a 72 y.o. female with hypertension, hyperlipidemia, mild obesity, sinus infections and breast neoplasm who has developed mild fatigue and some shortness of breath.  She states that when she walks her dog her symptoms have worsened and that did not happen 6 months ago. She denies chest pain, syncope, TIA, orthopnea, PND or lower extremity edema.  She has had a low-grade left breast cancer and received radiation in 2019.  She was found a heart murmur and further evaluation with an echocardiogram showed severe stenosis with calcification of the aortic valve leaflets and a peak velocity of 4.4 m/s part, aortic valve area 0.61 cmý and mean gradient of 44 mmHg.  The ejection fraction was 69% and there was no significant other valve disease.  She was evaluated by the heart valve team and Dr. Newman and a cardiac cath was performed that showed no significant coronary artery disease and a mean gradient of 48 mmHg.  She has no family history of aneurysms, dissections or connective tissue disease.  Denies a history of endocarditis, scarlet or rheumatic fever or IV drug use.  She is here for a surgical opinion         Patient Active Problem List   Diagnosis    Breast neoplasm, Tis (DCIS), left    Primary hypertension    Nonrheumatic aortic valve stenosis    Hyperlipidemia LDL goal <100         Medical History        Past Medical History:   Diagnosis Date    Anxiety      Aortic stenosis      Cataract      DCIS (ductal carcinoma in situ)       2019: left sided, lumpectomy/XRT/anastrozole    GERD (gastroesophageal reflux disease)      Heart murmur      HTN (hypertension)      Hyperlipidemia      Sinus infection 05/20/2019     PT PRESCRIBED ANTIBIOTIC FINISHED  2019             Surgical History         Past Surgical History:   Procedure Laterality Date    BREAST BIOPSY Left       MALIGNANT(LOW GRADE DUCTAL CARCINOMA)    BREAST LUMPECTOMY Left 2019     Procedure: Left breast needle localized lumpectomy,;  Surgeon: Beatriz Fuentes MD;  [place]  [facility] [place] [facility] [place]  Service: General    CARDIAC CATHETERIZATION N/A 2023     Procedure: Coronary angiography;  Surgeon: Bakari Newman MD;  Location:  [facility] CATH INVASIVE LOCATION;  Service: Cardiology;  Laterality: N/A;    CARDIAC CATHETERIZATION N/A 2023     Procedure: Left heart cath;  Surgeon: Bakari Newman MD;  Location:  [facility] CATH INVASIVE LOCATION;  Service: Cardiology;  Laterality: N/A;    CARDIAC CATHETERIZATION   2023     SECTION         X 1    COLONOSCOPY        HAND SURGERY Right      OVARIAN CYST REMOVAL                No Known Allergies        Current Outpatient Medications:     anastrozole (ARIMIDEX) 1 MG tablet, Take 1 tablet by mouth Daily., Disp: 90 tablet, Rfl: 1    APPLE CIDER VINEGAR PO, Take  by mouth., Disp: , Rfl:     ASPIRIN 81 PO, Take 81 mg by mouth Daily., Disp: , Rfl:     Calcium Carb-Cholecalciferol (CALCIUM 500 + D PO), Take  by mouth., Disp: , Rfl:     carvedilol (COREG) 6.25 MG tablet, Take 1 tablet by mouth 2 (Two) Times a Day With Meals., Disp: , Rfl:     Coenzyme Q10 (CO Q 10 PO), Take 1 capsule by mouth Daily., Disp: , Rfl:     diphenhydrAMINE (BENADRYL) 25 mg capsule, Take 1 capsule by mouth Every 6 (Six) Hours As Needed for Itching., Disp: , Rfl:     Glucosamine-Chondroitin (OSTEO BI-FLEX REGULAR STRENGTH PO), Take  by mouth., Disp: , Rfl:     Melatonin 10 MG tablet, Take 1 tablet by mouth Every Night., Disp: , Rfl:     rosuvastatin (CRESTOR) 40 MG tablet, Take 1 tablet by mouth Every Night., Disp: , Rfl:      Social History   Social History            Socioeconomic History    Marital status:     Number of children: 2   Tobacco Use    Smoking  status: Former       Packs/day: 0.25       Years: 5.00       Pack years: 1.25       Types: Cigarettes       Quit date: 1982       Years since quittin.7       Passive exposure: Past    Smokeless tobacco: Never    Tobacco comments:       Social smoker before    Vaping Use    Vaping Use: Never used   Substance and Sexual Activity    Alcohol use: Yes       Comment: 1-2 DRINKS MONTHLY     Drug use: No    Sexual activity: Not Currently       Partners: Male       Birth control/protection: Post-menopausal                  Family History   Problem Relation Age of Onset    Stroke Father      Hearing loss Father      Heart attack Father      Colon cancer Mother 89    Hearing loss Mother      Hypertension Mother      Diabetes Brother      Heart attack Brother      Depression Daughter      Breast cancer Maternal Aunt      Ovarian cancer Cousin      Cancer Cousin           FEMALE CANCER OF UNKNOWN ORGIN 60S    Malig Hyperthermia Neg Hx      Deep vein thrombosis Neg Hx      Pulmonary embolism Neg Hx      Uterine cancer Neg Hx        Review of Systems  As HPI, otherwise noncontributory     Physical Exam:     Vital Signs:  Weight: 81.6 kg (180 lb)   Body mass index is 30.9 kg/mý.  Temp: 97.3 øF (36.3 øC)   Heart Rate: 78   BP: 178/91      Constitutional:       Appearance: Well-developed.   Eyes:      Conjunctiva/sclera: Conjunctivae normal.      Pupils: Pupils are equal, round, and reactive to light.   HENT:      Head: Normocephalic and atraumatic.      Nose: Nose normal.   Neck:      Thyroid: No thyromegaly.      Vascular: No JVD.      Lymphadenopathy: No cervical adenopathy.   Pulmonary:      Effort: Pulmonary effort is normal.      Breath sounds: Normal breath sounds. No rales.   Cardiovascular:      Normal rate. Regular rhythm.      Murmurs: There is a grade 4/6 harsh midsystolic murmur at the URSB, radiating to the neck.      No gallop.  No click.   Pulses:     Intact distal pulses. No decreased pulses.   Edema:      Peripheral edema absent.   Abdominal:      General: Bowel sounds are normal. There is no distension.      Palpations: Abdomen is soft. There is no abdominal mass.      Tenderness: There is no abdominal tenderness.   Musculoskeletal: Normal range of motion.         General: No tenderness or deformity.      Cervical back: Normal range of motion and neck supple. Skin:     General: Skin is warm and dry.      Findings: No erythema or rash.   Neurological:      Mental Status: Alert and oriented to person, place, and time.      Deep Tendon Reflexes: Reflexes are normal and symmetric.   Psychiatric:         Behavior: Behavior normal.         Assessment:      Problems Addressed this Visit                  Cardiac and Vasculature     Primary hypertension - Primary     Nonrheumatic aortic valve stenosis     Hyperlipidemia LDL goal <100          Hematology and Neoplasia     Breast neoplasm, Tis (DCIS), left      Diagnoses           Codes Comments     Primary hypertension    -  Primary ICD-10-CM: I10  ICD-9-CM: 401.9       Nonrheumatic aortic valve stenosis     ICD-10-CM: I35.0  ICD-9-CM: 424.1       Hyperlipidemia LDL goal <100     ICD-10-CM: E78.5  ICD-9-CM: 272.4       Breast neoplasm, Tis (DCIS), left     ICD-10-CM: D05.12  ICD-9-CM: 233.0               Assessment/recommendation:      Severe nonrheumatic aortic stenosis with objective data of severity and mild symptoms.  She has no coronary artery disease or associated aneurysm disease.  She has low-level comorbidities and STS risk for surgery of less than 1.5%.  I recommend SAVR over TAVR due to the low level of comorbidities and age.  I discussed the risks (STS calculated), benefits and terms of surgery and she wishes to proceed.  I discussed with the patient the different options but given her age a biologic prosthesis is the best option.  I discussed with the patient importance of placing the largest valve possible to be able to place a percutaneous valve if need be in  the future.  Also I explained the patient the procedure which is a minimal invasive procedure through an upper mini sternotomy to decrease the complication rate, transfusion and enhance early recovery.  Hypertension, well controlled continue same regimen     Thank you for allowing me to participate in her care.     Regards,     Wali Boss M.D.      ADDENDUM:  This is a nice lady who is known to Dr. Boss, last seeing him in the office for surgical consultation on 9/5/23. At that time he recommended SAVR with mini sternotomy. I am seeing her for the first time today in [facility] where she presents for preoperative testing prior to her upcoming surgery. She denies any changes to her medical record since last being seen. Lab work reviewed. UA with trace leukocytes, but bacteria. COVID-19 screening negative. Carotid duplex with <50% stenosis bilaterally. CXR with no evidence of acute processes. EKG showed SR with RBBB. Plan for mini AVR with Dr. Boss on Wednesday. Reiterated to patient not to take home medications the morning of surgery. Questions answered with verbalized understanding.    Kelly Reyes, CARLOS  9/29/23  1:00PM

## 2023-09-29 NOTE — DISCHARGE INSTRUCTIONS
Take the following medications the morning of surgery with a small sip of water:      NO MED'S UNLESS INSTRUCTED BY YOUR DOCTOR    ARRIVE AT 6:00    If you are on prescription narcotic pain medication to control your pain you may also take that medication the morning of surgery.    General Instructions:  Do not eat or drink anything after midnight the night before surgery.  Infants may have breast milk up to four hours before surgery.  Infants drinking formula may drink formula up to six hours before surgery.   Patients who avoid smoking, chewing tobacco and alcohol for 4 weeks prior to surgery have a reduced risk of post-operative complications.  Quit smoking as many days before surgery as you can.  Do not smoke, use chewing tobacco or drink alcohol the day of surgery.   If applicable bring your C-PAP/ BI-PAP machine in with you to preop day of surgery.  Bring any papers given to you in the doctor’s office.  Wear clean comfortable clothes.  Do not wear contact lenses, false eyelashes or make-up.  Bring a case for your glasses.   Bring crutches or walker if applicable.  Remove all piercings.  Leave jewelry and any other valuables at home.  Hair extensions with metal clips must be removed prior to surgery.  The Pre-Admission Testing nurse will instruct you to bring medications if unable to obtain an accurate list in Pre-Admission Testing.        If you were given a blood bank ID arm band remember to bring it with you the day of surgery.    Preventing a Surgical Site Infection:  For 2 to 3 days before surgery, avoid shaving with a razor because the razor can irritate skin and make it easier to develop an infection.    Any areas of open skin can increase the risk of a post-operative wound infection by allowing bacteria to enter and travel throughout the body.  Notify your surgeon if you have any skin wounds / rashes even if it is not near the expected surgical site.  The area will need assessed to determine if surgery  should be delayed until it is healed.  The night prior to surgery shower using a fresh bar of anti-bacterial soap (such as Dial) and clean washcloth.  Sleep in a clean bed with clean clothing.  Do not allow pets to sleep with you.  Shower on the morning of surgery using a fresh bar of anti-bacterial soap (such as Dial) and clean washcloth.  Dry with a clean towel and dress in clean clothing.  Ask your surgeon if you will be receiving antibiotics prior to surgery.  Make sure you, your family, and all healthcare providers clean their hands with soap and water or an alcohol based hand  before caring for you or your wound.    Day of surgery:  Your arrival time is approximately two hours before your scheduled surgery time.  Upon arrival, a Pre-op nurse and Anesthesiologist will review your health history, obtain vital signs, and answer questions you may have.  The only belongings needed at this time will be your home medications and if applicable your C-PAP/BI-PAP machine.  A Pre-op nurse will start an IV and you may receive medication in preparation for surgery, including something to help you relax.      Please be aware that surgery does come with discomfort.  We want to make every effort to control your discomfort so please discuss any uncontrolled symptoms with your nurse.   Your doctor will most likely have prescribed pain medications.      If you are going home after surgery you will receive individualized written care instructions before being discharged.  A responsible adult must drive you to and from the hospital on the day of your surgery and stay with you for 24 hours.  Discharge prescriptions can be filled by the hospital pharmacy during regular pharmacy hours.  If you are having surgery late in the day/evening your prescription may be e-prescribed to your pharmacy.  Please verify your pharmacy hours or chose a 24 hour pharmacy to avoid not having access to your prescription because your pharmacy has  closed for the day.    If you are staying overnight following surgery, you will be transported to your hospital room following the recovery period.  [facility] has all private rooms.    If you have any questions please call Pre-Admission Testing at [phone].  Deductibles and co-payments are collected on the day of service. Please be prepared to pay the required co-pay, deductible or deposit on the day of service as defined by your plan.    Call your surgeon immediately if you experience any of the following symptoms:  Sore Throat  Shortness of Breath or difficulty breathing  Cough  Chills  Body soreness or muscle pain  Headache  Fever  New loss of taste or smell  Do not arrive for your surgery ill.  Your procedure will need to be rescheduled to another time.  You will need to call your physician before the day of surgery to avoid any unnecessary exposure to hospital staff as well as other patients.  CHLORHEXIDINE CLOTH INSTRUCTIONS  The morning of surgery follow these instructions using the Chlorhexidine cloths you've been given.  These steps reduce bacteria on the body.  Do not use the cloths near your eyes, ears mouth, genitalia or on open wounds.  Throw the cloths away after use but do not try to flush them down a toilet.      Open and remove one cloth at a time from the package.    Leave the cloth unfolded and begin the bathing.  Massage the skin with the cloths using gentle pressure to remove bacteria.  Do not scrub harshly.   Follow the steps below with one 2% CHG cloth per area (6 total cloths).  One cloth for neck, shoulders and chest.  One cloth for both arms, hands, fingers and underarms (do underarms last).  One cloth for the abdomen followed by groin.  One cloth for right leg and foot including between the toes.  One cloth for left leg and foot including between the toes.  The last cloth is to be used for the back of the neck, back and buttocks.    Allow the CHG to air dry 3 minutes  on the skin which will give it time to work and decrease the chance of irritation.  The skin may feel sticky until it is dry.  Do not rinse with water or any other liquid or you will lose the beneficial effects of the CHG.  If mild skin irritation occurs, do rinse the skin to remove the CHG.  Report this to the nurse at time of admission.  Do not apply lotions, creams, ointments, deodorants or perfumes after using the clothes. Dress in clean clothes before coming to the hospital.    BACTROBAN NASAL OINTMENT  There are many germs normally in your nose. Bactroban is an ointment that will help reduce these germs. Please follow these instructions for Bactroban use:      ____The day before surgery in the morning  Date________        ____The day of surgery in the morning    Date________    **Squirt ½ package of Bactroban Ointment onto a cotton applicator and apply to inside of 1st nostril.  Squirt the remaining Bactroban and apply to the inside of the other nostril.    PERIDEX- ORAL:  Use only if your surgeon has ordered  Use the night before and morning of surgery - Swish, gargle, and spit - do not swallow.

## 2023-09-29 NOTE — ANESTHESIA PREPROCEDURE EVALUATION
Anesthesia Evaluation     Patient summary reviewed and Nursing notes reviewed                Airway   Mallampati: III  TM distance: <3 FB  Neck ROM: full  Possible difficult intubation  Dental    (+) implants    Comment: Wears plastic mouth guard at night due to grinding of teeth    Pulmonary - normal exam    breath sounds clear to auscultation  (+) a smoker Former,  Cardiovascular     ECG reviewed  Rhythm: regular  Rate: normal    (+) hypertension less than 2 medications, valvular problems/murmurs AS, murmur, hyperlipidemia    ROS comment: EF 70%, severe AS by ECHO 6/23/mild nonobstructive CAD by cath 8/23  PE comment: KALPESH at [facility]    Neuro/Psych  (+) psychiatric history Anxiety  GI/Hepatic/Renal/Endo    (+) GERD    Musculoskeletal     Abdominal  - normal exam   Substance History      OB/GYN          Other      history of cancer      Other Comment: Hx left breast CA, s/p lumpectomy and radiation                  Anesthesia Plan    ASA 4     general     (Art line/CVC/SGC/CHELA/post-op vent/CMAC available for intubation)  intravenous induction   Postoperative Plan: Expected vent after surgery  Anesthetic plan, risks, benefits, and alternatives have been provided, discussed and informed consent has been obtained with: patient and child.    CODE STATUS:

## 2023-10-04 ENCOUNTER — ANESTHESIA (OUTPATIENT)
Dept: PERIOP | Facility: HOSPITAL | Age: 73
DRG: 221 | End: 2023-10-04
Payer: MEDICARE

## 2023-10-04 ENCOUNTER — ANCILLARY PROCEDURE (OUTPATIENT)
Dept: PERIOP | Facility: HOSPITAL | Age: 73
DRG: 221 | End: 2023-10-04
Payer: MEDICARE

## 2023-10-04 ENCOUNTER — HOSPITAL ENCOUNTER (INPATIENT)
Facility: HOSPITAL | Age: 73
LOS: 5 days | Discharge: HOME HEALTH | DRG: 221 | End: 2023-10-09
Attending: THORACIC SURGERY (CARDIOTHORACIC VASCULAR SURGERY) | Admitting: THORACIC SURGERY (CARDIOTHORACIC VASCULAR SURGERY)
Payer: MEDICARE

## 2023-10-04 ENCOUNTER — APPOINTMENT (OUTPATIENT)
Dept: GENERAL RADIOLOGY | Facility: HOSPITAL | Age: 73
DRG: 221 | End: 2023-10-04
Payer: MEDICARE

## 2023-10-04 DIAGNOSIS — I35.0 NONRHEUMATIC AORTIC VALVE STENOSIS: ICD-10-CM

## 2023-10-04 DIAGNOSIS — R79.9 ABNORMAL FINDING OF BLOOD CHEMISTRY, UNSPECIFIED: ICD-10-CM

## 2023-10-04 DIAGNOSIS — I10 ESSENTIAL (PRIMARY) HYPERTENSION: ICD-10-CM

## 2023-10-04 DIAGNOSIS — R79.1 ABNORMAL COAGULATION PROFILE: ICD-10-CM

## 2023-10-04 DIAGNOSIS — R06.89 RESPIRATORY INSUFFICIENCY: ICD-10-CM

## 2023-10-04 DIAGNOSIS — I50.32 CHRONIC DIASTOLIC (CONGESTIVE) HEART FAILURE: ICD-10-CM

## 2023-10-04 DIAGNOSIS — Z95.2 S/P AVR: Primary | ICD-10-CM

## 2023-10-04 LAB
ACT BLD: 107 SECONDS (ref 82–152)
ACT BLD: 125 SECONDS (ref 82–152)
ACT BLD: 359 SECONDS (ref 82–152)
ACT BLD: 444 SECONDS (ref 82–152)
ACT BLD: 558 SECONDS (ref 82–152)
ACT BLD: 648 SECONDS (ref 82–152)
ALBUMIN SERPL-MCNC: 3.9 G/DL (ref 3.5–5.2)
ALBUMIN SERPL-MCNC: 3.9 G/DL (ref 3.5–5.2)
ANION GAP SERPL CALCULATED.3IONS-SCNC: 6.8 MMOL/L (ref 5–15)
ANION GAP SERPL CALCULATED.3IONS-SCNC: 9 MMOL/L (ref 5–15)
APTT PPP: 30.1 SECONDS (ref 22.7–35.4)
ARTERIAL PATENCY WRIST A: ABNORMAL
ATMOSPHERIC PRESS: 749.5 MMHG
BASE EXCESS BLDA CALC-SCNC: -1.2 MMOL/L (ref 0–2)
BASE EXCESS BLDA CALC-SCNC: 0 MMOL/L (ref -5–5)
BASE EXCESS BLDA CALC-SCNC: 3 MMOL/L (ref -5–5)
BASE EXCESS BLDA CALC-SCNC: 4 MMOL/L (ref -5–5)
BASE EXCESS BLDA CALC-SCNC: 4 MMOL/L (ref -5–5)
BASE EXCESS BLDA CALC-SCNC: 5 MMOL/L (ref -5–5)
BASE EXCESS BLDA CALC-SCNC: 6 MMOL/L (ref -5–5)
BASOPHILS # BLD AUTO: 0.01 10*3/MM3 (ref 0–0.2)
BASOPHILS NFR BLD AUTO: 0.1 % (ref 0–1.5)
BDY SITE: ABNORMAL
BUN BLDA-MCNC: 10 MG/DL
BUN SERPL-MCNC: 11 MG/DL (ref 8–23)
BUN SERPL-MCNC: 12 MG/DL (ref 8–23)
BUN/CREAT SERPL: 15.5 (ref 7–25)
BUN/CREAT SERPL: 16.2 (ref 7–25)
CA-I BLD-MCNC: 5.1 MG/DL (ref 4.6–5.4)
CA-I BLDA-SCNC: 1.3 MMOL/L (ref 2.2–2.55)
CA-I BLDA-SCNC: ABNORMAL MMOL/L
CA-I SERPL ISE-MCNC: 1.28 MMOL/L (ref 1.15–1.35)
CALCIUM SPEC-SCNC: 8.4 MG/DL (ref 8.6–10.5)
CALCIUM SPEC-SCNC: 9.2 MG/DL (ref 8.6–10.5)
CHLORIDE BLDA-SCNC: 111 MMOL/L (ref 98–107)
CHLORIDE SERPL-SCNC: 112 MMOL/L (ref 98–107)
CHLORIDE SERPL-SCNC: 112 MMOL/L (ref 98–107)
CO2 BLDA-SCNC: 25.7 MMOL/L (ref 23–27)
CO2 BLDA-SCNC: 26 MMOL/L (ref 24–29)
CO2 BLDA-SCNC: 27 MMOL/L (ref 23–27)
CO2 BLDA-SCNC: 28 MMOL/L (ref 24–29)
CO2 BLDA-SCNC: 29 MMOL/L (ref 24–29)
CO2 BLDA-SCNC: 29 MMOL/L (ref 24–29)
CO2 BLDA-SCNC: 30 MMOL/L (ref 24–29)
CO2 BLDA-SCNC: 32 MMOL/L (ref 24–29)
CO2 SERPL-SCNC: 23 MMOL/L (ref 22–29)
CO2 SERPL-SCNC: 26.2 MMOL/L (ref 22–29)
CREAT BLDA-MCNC: 0.69 MG/DL (ref 0.6–130)
CREAT SERPL-MCNC: 0.71 MG/DL (ref 0.57–1)
CREAT SERPL-MCNC: 0.74 MG/DL (ref 0.57–1)
D-LACTATE SERPL-SCNC: 0.8 MMOL/L (ref 0.5–2)
DEPRECATED RDW RBC AUTO: 44.3 FL (ref 37–54)
DEPRECATED RDW RBC AUTO: 44.4 FL (ref 37–54)
EGFRCR SERPLBLD CKD-EPI 2021: 86.1 ML/MIN/1.73
EGFRCR SERPLBLD CKD-EPI 2021: 90.5 ML/MIN/1.73
EOSINOPHIL # BLD AUTO: 0.04 10*3/MM3 (ref 0–0.4)
EOSINOPHIL NFR BLD AUTO: 0.4 % (ref 0.3–6.2)
ERYTHROCYTE [DISTWIDTH] IN BLOOD BY AUTOMATED COUNT: 14.4 % (ref 12.3–15.4)
ERYTHROCYTE [DISTWIDTH] IN BLOOD BY AUTOMATED COUNT: 14.5 % (ref 12.3–15.4)
FIBRINOGEN PPP-MCNC: 192 MG/DL (ref 219–464)
GLUCOSE BLDC GLUCOMTR-MCNC: 106 MG/DL (ref 70–130)
GLUCOSE BLDC GLUCOMTR-MCNC: 109 MG/DL (ref 70–130)
GLUCOSE BLDC GLUCOMTR-MCNC: 123 MG/DL (ref 70–130)
GLUCOSE BLDC GLUCOMTR-MCNC: 127 MG/DL (ref 70–130)
GLUCOSE BLDC GLUCOMTR-MCNC: 131 MG/DL (ref 70–130)
GLUCOSE BLDC GLUCOMTR-MCNC: 162 MG/DL (ref 70–130)
GLUCOSE BLDC GLUCOMTR-MCNC: 162 MG/DL (ref 70–130)
GLUCOSE BLDC GLUCOMTR-MCNC: 168 MG/DL (ref 70–130)
GLUCOSE BLDC GLUCOMTR-MCNC: 170 MG/DL (ref 70–130)
GLUCOSE BLDC GLUCOMTR-MCNC: 174 MG/DL (ref 70–130)
GLUCOSE BLDC GLUCOMTR-MCNC: 191 MG/DL (ref 70–130)
GLUCOSE BLDC GLUCOMTR-MCNC: 195 MG/DL (ref 70–130)
GLUCOSE BLDC GLUCOMTR-MCNC: 211 MG/DL (ref 70–130)
GLUCOSE SERPL-MCNC: 124 MG/DL (ref 65–99)
GLUCOSE SERPL-MCNC: 169 MG/DL (ref 65–99)
HCO3 BLDA-SCNC: 24.4 MMOL/L (ref 22–28)
HCO3 BLDA-SCNC: 25.2 MMOL/L (ref 22–26)
HCO3 BLDA-SCNC: 26.7 MMOL/L (ref 22–26)
HCO3 BLDA-SCNC: 28 MMOL/L (ref 22–26)
HCO3 BLDA-SCNC: 28.1 MMOL/L (ref 22–26)
HCO3 BLDA-SCNC: 28.9 MMOL/L (ref 22–26)
HCO3 BLDA-SCNC: 30.5 MMOL/L (ref 22–26)
HCT VFR BLD AUTO: 35.4 % (ref 34–46.6)
HCT VFR BLD AUTO: 35.8 % (ref 34–46.6)
HCT VFR BLDA CALC: 26 % (ref 38–51)
HCT VFR BLDA CALC: 26 % (ref 38–51)
HCT VFR BLDA CALC: 28 % (ref 38–51)
HCT VFR BLDA CALC: 29 % (ref 38–51)
HCT VFR BLDA CALC: 30 % (ref 38–51)
HCT VFR BLDA CALC: 39 % (ref 38–51)
HEMODILUTION: NO
HGB BLD-MCNC: 11.4 G/DL (ref 12–15.9)
HGB BLD-MCNC: 11.8 G/DL (ref 12–15.9)
HGB BLDA-MCNC: 10.2 G/DL (ref 12–17)
HGB BLDA-MCNC: 13.3 G/DL (ref 12–17)
HGB BLDA-MCNC: 8.8 G/DL (ref 12–17)
HGB BLDA-MCNC: 8.8 G/DL (ref 12–17)
HGB BLDA-MCNC: 9.5 G/DL (ref 12–17)
HGB BLDA-MCNC: 9.9 G/DL (ref 12–17)
IMM GRANULOCYTES # BLD AUTO: 0.05 10*3/MM3 (ref 0–0.05)
IMM GRANULOCYTES NFR BLD AUTO: 0.5 % (ref 0–0.5)
INHALED O2 CONCENTRATION: 40 %
INR PPP: 1.34 (ref 0.9–1.1)
LYMPHOCYTES # BLD AUTO: 1.27 10*3/MM3 (ref 0.7–3.1)
LYMPHOCYTES NFR BLD AUTO: 12.9 % (ref 19.6–45.3)
MAGNESIUM SERPL-MCNC: 2.8 MG/DL (ref 1.6–2.4)
MAGNESIUM SERPL-MCNC: 3 MG/DL (ref 1.6–2.4)
MCH RBC QN AUTO: 27.1 PG (ref 26.6–33)
MCH RBC QN AUTO: 28 PG (ref 26.6–33)
MCHC RBC AUTO-ENTMCNC: 31.8 G/DL (ref 31.5–35.7)
MCHC RBC AUTO-ENTMCNC: 33.3 G/DL (ref 31.5–35.7)
MCV RBC AUTO: 84.1 FL (ref 79–97)
MCV RBC AUTO: 85.2 FL (ref 79–97)
MODALITY: ABNORMAL
MONOCYTES # BLD AUTO: 0.78 10*3/MM3 (ref 0.1–0.9)
MONOCYTES NFR BLD AUTO: 7.9 % (ref 5–12)
NEUTROPHILS NFR BLD AUTO: 7.7 10*3/MM3 (ref 1.7–7)
NEUTROPHILS NFR BLD AUTO: 78.2 % (ref 42.7–76)
NRBC BLD AUTO-RTO: 0.1 /100 WBC (ref 0–0.2)
O2 A-A PPRESDIFF RESPIRATORY: 0.6 MMHG
PAW @ PEAK INSP FLOW SETTING VENT: 15 CMH2O
PCO2 BLDA: 32.7 MM HG (ref 35–45)
PCO2 BLDA: 43.5 MM HG (ref 35–45)
PCO2 BLDA: 43.6 MM HG (ref 35–45)
PCO2 BLDA: 43.7 MM HG (ref 35–45)
PCO2 BLDA: 44 MM HG (ref 35–45)
PCO2 BLDA: 44.4 MM HG (ref 35–45)
PCO2 BLDA: 47.7 MM HG (ref 35–45)
PEEP RESPIRATORY: 8 CM[H2O]
PH BLDA: 7.36 PH UNITS (ref 7.35–7.45)
PH BLDA: 7.36 PH UNITS (ref 7.35–7.6)
PH BLDA: 7.41 PH UNITS (ref 7.35–7.6)
PH BLDA: 7.43 PH UNITS (ref 7.35–7.6)
PH BLDA: 7.43 PH UNITS (ref 7.35–7.6)
PH BLDA: 7.44 PH UNITS (ref 7.35–7.6)
PH BLDA: 7.51 PH UNITS (ref 7.35–7.6)
PHOSPHATE SERPL-MCNC: 2 MG/DL (ref 2.5–4.5)
PHOSPHATE SERPL-MCNC: 3.6 MG/DL (ref 2.5–4.5)
PLATELET # BLD AUTO: 113 10*3/MM3 (ref 140–450)
PLATELET # BLD AUTO: 138 10*3/MM3 (ref 140–450)
PMV BLD AUTO: 9.1 FL (ref 6–12)
PMV BLD AUTO: 9.3 FL (ref 6–12)
PO2 BLDA: 152.6 MM HG (ref 80–100)
PO2 BLDA: 394 MMHG (ref 80–105)
PO2 BLDA: 405 MMHG (ref 80–105)
PO2 BLDA: 49 MMHG (ref 80–105)
PO2 BLDA: 520 MMHG (ref 80–105)
PO2 BLDA: 545 MMHG (ref 80–105)
PO2 BLDA: 560 MMHG (ref 80–105)
POTASSIUM BLDA-SCNC: 3.8 MMOL/L (ref 3.5–5.2)
POTASSIUM BLDA-SCNC: 3.9 MMOL/L (ref 3.5–4.9)
POTASSIUM BLDA-SCNC: 4 MMOL/L (ref 3.5–4.9)
POTASSIUM BLDA-SCNC: 4 MMOL/L (ref 3.5–4.9)
POTASSIUM BLDA-SCNC: 4.3 MMOL/L (ref 3.5–4.9)
POTASSIUM BLDA-SCNC: 4.7 MMOL/L (ref 3.5–4.9)
POTASSIUM BLDA-SCNC: 4.9 MMOL/L (ref 3.5–4.9)
POTASSIUM SERPL-SCNC: 3.8 MMOL/L (ref 3.5–5.2)
POTASSIUM SERPL-SCNC: 4.6 MMOL/L (ref 3.5–5.2)
PROTHROMBIN TIME: 16.7 SECONDS (ref 11.7–14.2)
PSV: 7 CMH2O
RBC # BLD AUTO: 4.2 10*6/MM3 (ref 3.77–5.28)
RBC # BLD AUTO: 4.21 10*6/MM3 (ref 3.77–5.28)
SAO2 % BLDA: 100 % (ref 95–98)
SAO2 % BLDA: 85 % (ref 95–98)
SAO2 % BLDCOA: 99.3 % (ref 92–98.5)
SODIUM BLD-SCNC: 146 MMOL/L (ref 136–145)
SODIUM SERPL-SCNC: 144 MMOL/L (ref 136–145)
SODIUM SERPL-SCNC: 145 MMOL/L (ref 136–145)
TOTAL RATE: 18 BREATHS/MINUTE
VENTILATOR MODE: ABNORMAL
WBC NRBC COR # BLD: 8.98 10*3/MM3 (ref 3.4–10.8)
WBC NRBC COR # BLD: 9.85 10*3/MM3 (ref 3.4–10.8)

## 2023-10-04 PROCEDURE — P9041 ALBUMIN (HUMAN),5%, 50ML: HCPCS | Performed by: NURSE PRACTITIONER

## 2023-10-04 PROCEDURE — 25010000002 PHENYLEPHRINE 10 MG/ML SOLUTION: Performed by: ANESTHESIOLOGY

## 2023-10-04 PROCEDURE — P9041 ALBUMIN (HUMAN),5%, 50ML: HCPCS | Performed by: ANESTHESIOLOGY

## 2023-10-04 PROCEDURE — 93005 ELECTROCARDIOGRAM TRACING: CPT | Performed by: NURSE PRACTITIONER

## 2023-10-04 PROCEDURE — 71045 X-RAY EXAM CHEST 1 VIEW: CPT

## 2023-10-04 PROCEDURE — 88311 DECALCIFY TISSUE: CPT | Performed by: THORACIC SURGERY (CARDIOTHORACIC VASCULAR SURGERY)

## 2023-10-04 PROCEDURE — C1889 IMPLANT/INSERT DEVICE, NOC: HCPCS | Performed by: THORACIC SURGERY (CARDIOTHORACIC VASCULAR SURGERY)

## 2023-10-04 PROCEDURE — 86900 BLOOD TYPING SEROLOGIC ABO: CPT

## 2023-10-04 PROCEDURE — 33411 REPLACEMENT OF AORTIC VALVE: CPT | Performed by: THORACIC SURGERY (CARDIOTHORACIC VASCULAR SURGERY)

## 2023-10-04 PROCEDURE — 25010000002 PROPOFOL 10 MG/ML EMULSION: Performed by: ANESTHESIOLOGY

## 2023-10-04 PROCEDURE — 25010000002 MIDAZOLAM PER 1 MG: Performed by: ANESTHESIOLOGY

## 2023-10-04 PROCEDURE — 82330 ASSAY OF CALCIUM: CPT | Performed by: NURSE PRACTITIONER

## 2023-10-04 PROCEDURE — 93010 ELECTROCARDIOGRAM REPORT: CPT | Performed by: INTERNAL MEDICINE

## 2023-10-04 PROCEDURE — 85347 COAGULATION TIME ACTIVATED: CPT

## 2023-10-04 PROCEDURE — 83605 ASSAY OF LACTIC ACID: CPT

## 2023-10-04 PROCEDURE — 25810000003 SODIUM CHLORIDE 0.9 % SOLUTION

## 2023-10-04 PROCEDURE — 25010000002 ALBUMIN HUMAN 5% PER 50 ML: Performed by: NURSE PRACTITIONER

## 2023-10-04 PROCEDURE — 25010000002 VANCOMYCIN 1 G RECONSTITUTED SOLUTION

## 2023-10-04 PROCEDURE — 02RF08Z REPLACEMENT OF AORTIC VALVE WITH ZOOPLASTIC TISSUE, OPEN APPROACH: ICD-10-PCS | Performed by: THORACIC SURGERY (CARDIOTHORACIC VASCULAR SURGERY)

## 2023-10-04 PROCEDURE — 25810000003 SODIUM CHLORIDE 0.9 % SOLUTION 250 ML FLEX CONT: Performed by: ANESTHESIOLOGY

## 2023-10-04 PROCEDURE — C1751 CATH, INF, PER/CENT/MIDLINE: HCPCS | Performed by: ANESTHESIOLOGY

## 2023-10-04 PROCEDURE — 25010000002 MAGNESIUM SULFATE 2 GM/50ML SOLUTION: Performed by: NURSE PRACTITIONER

## 2023-10-04 PROCEDURE — 85014 HEMATOCRIT: CPT

## 2023-10-04 PROCEDURE — 3E080GC INTRODUCTION OF OTHER THERAPEUTIC SUBSTANCE INTO HEART, OPEN APPROACH: ICD-10-PCS | Performed by: THORACIC SURGERY (CARDIOTHORACIC VASCULAR SURGERY)

## 2023-10-04 PROCEDURE — 85610 PROTHROMBIN TIME: CPT | Performed by: NURSE PRACTITIONER

## 2023-10-04 PROCEDURE — C1713 ANCHOR/SCREW BN/BN,TIS/BN: HCPCS | Performed by: THORACIC SURGERY (CARDIOTHORACIC VASCULAR SURGERY)

## 2023-10-04 PROCEDURE — 85384 FIBRINOGEN ACTIVITY: CPT | Performed by: NURSE PRACTITIONER

## 2023-10-04 PROCEDURE — 94760 N-INVAS EAR/PLS OXIMETRY 1: CPT

## 2023-10-04 PROCEDURE — 5A1221Z PERFORMANCE OF CARDIAC OUTPUT, CONTINUOUS: ICD-10-PCS | Performed by: THORACIC SURGERY (CARDIOTHORACIC VASCULAR SURGERY)

## 2023-10-04 PROCEDURE — 94799 UNLISTED PULMONARY SVC/PX: CPT

## 2023-10-04 PROCEDURE — 82948 REAGENT STRIP/BLOOD GLUCOSE: CPT

## 2023-10-04 PROCEDURE — 25010000002 MORPHINE PER 10 MG: Performed by: NURSE PRACTITIONER

## 2023-10-04 PROCEDURE — 82803 BLOOD GASES ANY COMBINATION: CPT

## 2023-10-04 PROCEDURE — 25010000002 PROTAMINE SULFATE PER 10 MG: Performed by: ANESTHESIOLOGY

## 2023-10-04 PROCEDURE — 80047 BASIC METABLC PNL IONIZED CA: CPT

## 2023-10-04 PROCEDURE — 25010000002 CEFAZOLIN IN DEXTROSE 2-4 GM/100ML-% SOLUTION: Performed by: ANESTHESIOLOGY

## 2023-10-04 PROCEDURE — 25010000002 METOCLOPRAMIDE PER 10 MG: Performed by: NURSE PRACTITIONER

## 2023-10-04 PROCEDURE — 25010000002 HEPARIN (PORCINE) PER 1000 UNITS: Performed by: ANESTHESIOLOGY

## 2023-10-04 PROCEDURE — P9047 ALBUMIN (HUMAN), 25%, 50ML: HCPCS

## 2023-10-04 PROCEDURE — 25010000002 ALBUMIN HUMAN 5% PER 50 ML: Performed by: ANESTHESIOLOGY

## 2023-10-04 PROCEDURE — 0 POTASSIUM CHLORIDE PER 2 MEQ: Performed by: THORACIC SURGERY (CARDIOTHORACIC VASCULAR SURGERY)

## 2023-10-04 PROCEDURE — 25010000002 ACETAMINOPHEN 10 MG/ML SOLUTION: Performed by: NURSE PRACTITIONER

## 2023-10-04 PROCEDURE — C1769 GUIDE WIRE: HCPCS | Performed by: THORACIC SURGERY (CARDIOTHORACIC VASCULAR SURGERY)

## 2023-10-04 PROCEDURE — 88305 TISSUE EXAM BY PATHOLOGIST: CPT | Performed by: THORACIC SURGERY (CARDIOTHORACIC VASCULAR SURGERY)

## 2023-10-04 PROCEDURE — 86901 BLOOD TYPING SEROLOGIC RH(D): CPT

## 2023-10-04 PROCEDURE — 93318 ECHO TRANSESOPHAGEAL INTRAOP: CPT | Performed by: ANESTHESIOLOGY

## 2023-10-04 PROCEDURE — 94002 VENT MGMT INPAT INIT DAY: CPT

## 2023-10-04 PROCEDURE — 85025 COMPLETE CBC W/AUTO DIFF WBC: CPT | Performed by: NURSE PRACTITIONER

## 2023-10-04 PROCEDURE — 82947 ASSAY GLUCOSE BLOOD QUANT: CPT

## 2023-10-04 PROCEDURE — 85018 HEMOGLOBIN: CPT

## 2023-10-04 PROCEDURE — 80069 RENAL FUNCTION PANEL: CPT | Performed by: NURSE PRACTITIONER

## 2023-10-04 PROCEDURE — 94761 N-INVAS EAR/PLS OXIMETRY MLT: CPT

## 2023-10-04 PROCEDURE — 63710000001 INSULIN REGULAR HUMAN PER 5 UNITS: Performed by: ANESTHESIOLOGY

## 2023-10-04 PROCEDURE — 25010000002 HEPARIN (PORCINE) PER 1000 UNITS

## 2023-10-04 PROCEDURE — 25010000002 MAGNESIUM SULFATE PER 500 MG OF MAGNESIUM: Performed by: ANESTHESIOLOGY

## 2023-10-04 PROCEDURE — 85730 THROMBOPLASTIN TIME PARTIAL: CPT | Performed by: NURSE PRACTITIONER

## 2023-10-04 PROCEDURE — 25010000002 ALBUMIN HUMAN 25% PER 50 ML

## 2023-10-04 PROCEDURE — 85027 COMPLETE CBC AUTOMATED: CPT | Performed by: NURSE PRACTITIONER

## 2023-10-04 PROCEDURE — 33411 REPLACEMENT OF AORTIC VALVE: CPT | Performed by: PHYSICIAN ASSISTANT

## 2023-10-04 PROCEDURE — C1768 GRAFT, VASCULAR: HCPCS | Performed by: THORACIC SURGERY (CARDIOTHORACIC VASCULAR SURGERY)

## 2023-10-04 PROCEDURE — 25010000002 FENTANYL CITRATE (PF) 50 MCG/ML SOLUTION: Performed by: ANESTHESIOLOGY

## 2023-10-04 PROCEDURE — 83735 ASSAY OF MAGNESIUM: CPT | Performed by: NURSE PRACTITIONER

## 2023-10-04 DEVICE — SS SUTURE, 3 PER SLEEVE
Type: IMPLANTABLE DEVICE | Site: STERNUM | Status: FUNCTIONAL
Brand: MYO/WIRE II

## 2023-10-04 DEVICE — PTCH VASC XENOSURE PLS BIO 2X9CM: Type: IMPLANTABLE DEVICE | Site: HEART | Status: FUNCTIONAL

## 2023-10-04 DEVICE — VLV PERICARD PERIMOUNT MAGNA EASE 21: Type: IMPLANTABLE DEVICE | Site: HEART | Status: FUNCTIONAL

## 2023-10-04 DEVICE — SS SUTURE, 4 PER SLEEVE
Type: IMPLANTABLE DEVICE | Site: STERNUM | Status: FUNCTIONAL
Brand: MYO/WIRE II

## 2023-10-04 DEVICE — ABSORBABLE HEMOSTAT (OXIDIZED REGENERATED CELLULOSE, U.S.P.)
Type: IMPLANTABLE DEVICE | Site: HEART | Status: FUNCTIONAL
Brand: SURGICEL

## 2023-10-04 RX ORDER — NICARDIPINE HYDROCHLORIDE 2.5 MG/ML
INJECTION INTRAVENOUS AS NEEDED
Status: DISCONTINUED | OUTPATIENT
Start: 2023-10-04 | End: 2023-10-04 | Stop reason: SURG

## 2023-10-04 RX ORDER — ASPIRIN 81 MG/1
81 TABLET ORAL DAILY
Status: DISCONTINUED | OUTPATIENT
Start: 2023-10-05 | End: 2023-10-09 | Stop reason: HOSPADM

## 2023-10-04 RX ORDER — SODIUM CHLORIDE, SODIUM LACTATE, POTASSIUM CHLORIDE, CALCIUM CHLORIDE 600; 310; 30; 20 MG/100ML; MG/100ML; MG/100ML; MG/100ML
9 INJECTION, SOLUTION INTRAVENOUS CONTINUOUS
Status: DISCONTINUED | OUTPATIENT
Start: 2023-10-04 | End: 2023-10-04

## 2023-10-04 RX ORDER — ENOXAPARIN SODIUM 100 MG/ML
40 INJECTION SUBCUTANEOUS DAILY
Status: DISCONTINUED | OUTPATIENT
Start: 2023-10-05 | End: 2023-10-09 | Stop reason: HOSPADM

## 2023-10-04 RX ORDER — METHADONE HYDROCHLORIDE 10 MG/ML
INJECTION, SOLUTION INTRAMUSCULAR; INTRAVENOUS; SUBCUTANEOUS AS NEEDED
Status: DISCONTINUED | OUTPATIENT
Start: 2023-10-04 | End: 2023-10-04 | Stop reason: SURG

## 2023-10-04 RX ORDER — NOREPINEPHRINE BITARTRATE 1 MG/ML
INJECTION, SOLUTION INTRAVENOUS CONTINUOUS PRN
Status: DISCONTINUED | OUTPATIENT
Start: 2023-10-04 | End: 2023-10-04 | Stop reason: SURG

## 2023-10-04 RX ORDER — ACETAMINOPHEN 650 MG/1
650 SUPPOSITORY RECTAL EVERY 4 HOURS PRN
Status: DISCONTINUED | OUTPATIENT
Start: 2023-10-05 | End: 2023-10-09 | Stop reason: HOSPADM

## 2023-10-04 RX ORDER — CHLORHEXIDINE GLUCONATE 500 MG/1
1 CLOTH TOPICAL EVERY 12 HOURS PRN
Status: DISCONTINUED | OUTPATIENT
Start: 2023-10-04 | End: 2023-10-04 | Stop reason: HOSPADM

## 2023-10-04 RX ORDER — MIDAZOLAM HYDROCHLORIDE 1 MG/ML
2 INJECTION INTRAMUSCULAR; INTRAVENOUS
Status: DISCONTINUED | OUTPATIENT
Start: 2023-10-04 | End: 2023-10-05

## 2023-10-04 RX ORDER — ACETAMINOPHEN 650 MG/1
650 SUPPOSITORY RECTAL EVERY 4 HOURS
Status: ACTIVE | OUTPATIENT
Start: 2023-10-04 | End: 2023-10-05

## 2023-10-04 RX ORDER — CHLORHEXIDINE GLUCONATE ORAL RINSE 1.2 MG/ML
15 SOLUTION DENTAL ONCE
Status: COMPLETED | OUTPATIENT
Start: 2023-10-04 | End: 2023-10-04

## 2023-10-04 RX ORDER — CYCLOBENZAPRINE HCL 10 MG
10 TABLET ORAL EVERY 8 HOURS PRN
Status: DISCONTINUED | OUTPATIENT
Start: 2023-10-05 | End: 2023-10-09 | Stop reason: HOSPADM

## 2023-10-04 RX ORDER — MAGNESIUM SULFATE HEPTAHYDRATE 40 MG/ML
2 INJECTION, SOLUTION INTRAVENOUS EVERY 8 HOURS
Status: COMPLETED | OUTPATIENT
Start: 2023-10-04 | End: 2023-10-05

## 2023-10-04 RX ORDER — AMINOCAPROIC ACID 250 MG/ML
INJECTION, SOLUTION INTRAVENOUS AS NEEDED
Status: DISCONTINUED | OUTPATIENT
Start: 2023-10-04 | End: 2023-10-04 | Stop reason: SURG

## 2023-10-04 RX ORDER — SODIUM CHLORIDE 9 MG/ML
INJECTION, SOLUTION INTRAVENOUS CONTINUOUS PRN
Status: DISCONTINUED | OUTPATIENT
Start: 2023-10-04 | End: 2023-10-04 | Stop reason: SURG

## 2023-10-04 RX ORDER — FENTANYL CITRATE 50 UG/ML
50 INJECTION, SOLUTION INTRAMUSCULAR; INTRAVENOUS ONCE AS NEEDED
Status: COMPLETED | OUTPATIENT
Start: 2023-10-04 | End: 2023-10-04

## 2023-10-04 RX ORDER — ACETAMINOPHEN 160 MG/5ML
650 SOLUTION ORAL EVERY 4 HOURS
Status: ACTIVE | OUTPATIENT
Start: 2023-10-04 | End: 2023-10-05

## 2023-10-04 RX ORDER — POLYETHYLENE GLYCOL 3350 17 G/17G
17 POWDER, FOR SOLUTION ORAL DAILY PRN
Status: DISCONTINUED | OUTPATIENT
Start: 2023-10-04 | End: 2023-10-09 | Stop reason: HOSPADM

## 2023-10-04 RX ORDER — PHENYLEPHRINE HCL IN 0.9% NACL 0.5 MG/5ML
.2-2 SYRINGE (ML) INTRAVENOUS CONTINUOUS PRN
Status: DISCONTINUED | OUTPATIENT
Start: 2023-10-04 | End: 2023-10-05

## 2023-10-04 RX ORDER — BISACODYL 10 MG
10 SUPPOSITORY, RECTAL RECTAL DAILY PRN
Status: DISCONTINUED | OUTPATIENT
Start: 2023-10-05 | End: 2023-10-09 | Stop reason: HOSPADM

## 2023-10-04 RX ORDER — OXYCODONE HYDROCHLORIDE 5 MG/1
10 TABLET ORAL EVERY 4 HOURS PRN
Status: DISCONTINUED | OUTPATIENT
Start: 2023-10-04 | End: 2023-10-09 | Stop reason: HOSPADM

## 2023-10-04 RX ORDER — DEXMEDETOMIDINE HYDROCHLORIDE 4 UG/ML
.2-1.5 INJECTION, SOLUTION INTRAVENOUS
Status: DISCONTINUED | OUTPATIENT
Start: 2023-10-04 | End: 2023-10-05

## 2023-10-04 RX ORDER — ALPRAZOLAM 0.25 MG/1
0.25 TABLET ORAL EVERY 8 HOURS PRN
Status: DISCONTINUED | OUTPATIENT
Start: 2023-10-04 | End: 2023-10-09 | Stop reason: HOSPADM

## 2023-10-04 RX ORDER — MAGNESIUM HYDROXIDE 1200 MG/15ML
LIQUID ORAL AS NEEDED
Status: DISCONTINUED | OUTPATIENT
Start: 2023-10-04 | End: 2023-10-04 | Stop reason: HOSPADM

## 2023-10-04 RX ORDER — DOPAMINE HYDROCHLORIDE 160 MG/100ML
2-20 INJECTION, SOLUTION INTRAVENOUS CONTINUOUS PRN
Status: DISCONTINUED | OUTPATIENT
Start: 2023-10-04 | End: 2023-10-05

## 2023-10-04 RX ORDER — DEXTROSE MONOHYDRATE 25 G/50ML
10-50 INJECTION, SOLUTION INTRAVENOUS
Status: DISCONTINUED | OUTPATIENT
Start: 2023-10-04 | End: 2023-10-05

## 2023-10-04 RX ORDER — NITROGLYCERIN 0.4 MG/1
0.4 TABLET SUBLINGUAL
Status: DISCONTINUED | OUTPATIENT
Start: 2023-10-04 | End: 2023-10-09 | Stop reason: HOSPADM

## 2023-10-04 RX ORDER — SODIUM CHLORIDE 0.9 % (FLUSH) 0.9 %
3-10 SYRINGE (ML) INJECTION AS NEEDED
Status: DISCONTINUED | OUTPATIENT
Start: 2023-10-04 | End: 2023-10-04 | Stop reason: HOSPADM

## 2023-10-04 RX ORDER — ATORVASTATIN CALCIUM 20 MG/1
40 TABLET, FILM COATED ORAL NIGHTLY
Status: DISCONTINUED | OUTPATIENT
Start: 2023-10-04 | End: 2023-10-09 | Stop reason: HOSPADM

## 2023-10-04 RX ORDER — PANTOPRAZOLE SODIUM 40 MG/10ML
40 INJECTION, POWDER, LYOPHILIZED, FOR SOLUTION INTRAVENOUS ONCE
Status: COMPLETED | OUTPATIENT
Start: 2023-10-04 | End: 2023-10-04

## 2023-10-04 RX ORDER — PANTOPRAZOLE SODIUM 40 MG/1
40 TABLET, DELAYED RELEASE ORAL EVERY MORNING
Status: DISCONTINUED | OUTPATIENT
Start: 2023-10-05 | End: 2023-10-09 | Stop reason: HOSPADM

## 2023-10-04 RX ORDER — ACETAMINOPHEN 325 MG/1
650 TABLET ORAL EVERY 4 HOURS PRN
Status: DISCONTINUED | OUTPATIENT
Start: 2023-10-05 | End: 2023-10-09 | Stop reason: HOSPADM

## 2023-10-04 RX ORDER — CEFAZOLIN SODIUM 2 G/100ML
INJECTION, SOLUTION INTRAVENOUS AS NEEDED
Status: DISCONTINUED | OUTPATIENT
Start: 2023-10-04 | End: 2023-10-04 | Stop reason: SURG

## 2023-10-04 RX ORDER — METOCLOPRAMIDE HYDROCHLORIDE 5 MG/ML
10 INJECTION INTRAMUSCULAR; INTRAVENOUS EVERY 6 HOURS
Status: DISPENSED | OUTPATIENT
Start: 2023-10-04 | End: 2023-10-05

## 2023-10-04 RX ORDER — MIDAZOLAM HYDROCHLORIDE 1 MG/ML
0.5 INJECTION INTRAMUSCULAR; INTRAVENOUS
Status: COMPLETED | OUTPATIENT
Start: 2023-10-04 | End: 2023-10-04

## 2023-10-04 RX ORDER — CALCIUM CHLORIDE 100 MG/ML
INJECTION INTRAVENOUS; INTRAVENTRICULAR AS NEEDED
Status: DISCONTINUED | OUTPATIENT
Start: 2023-10-04 | End: 2023-10-04 | Stop reason: SURG

## 2023-10-04 RX ORDER — NALOXONE HCL 0.4 MG/ML
0.4 VIAL (ML) INJECTION
Status: DISCONTINUED | OUTPATIENT
Start: 2023-10-04 | End: 2023-10-09 | Stop reason: HOSPADM

## 2023-10-04 RX ORDER — MAGNESIUM SULFATE HEPTAHYDRATE 500 MG/ML
INJECTION, SOLUTION INTRAMUSCULAR; INTRAVENOUS AS NEEDED
Status: DISCONTINUED | OUTPATIENT
Start: 2023-10-04 | End: 2023-10-04 | Stop reason: SURG

## 2023-10-04 RX ORDER — NICOTINE POLACRILEX 4 MG
15 LOZENGE BUCCAL
Status: DISCONTINUED | OUTPATIENT
Start: 2023-10-04 | End: 2023-10-05

## 2023-10-04 RX ORDER — ALBUMIN, HUMAN INJ 5% 5 %
SOLUTION INTRAVENOUS CONTINUOUS PRN
Status: DISCONTINUED | OUTPATIENT
Start: 2023-10-04 | End: 2023-10-04 | Stop reason: SURG

## 2023-10-04 RX ORDER — SODIUM CHLORIDE 0.9 % (FLUSH) 0.9 %
3 SYRINGE (ML) INJECTION EVERY 12 HOURS SCHEDULED
Status: DISCONTINUED | OUTPATIENT
Start: 2023-10-04 | End: 2023-10-04 | Stop reason: HOSPADM

## 2023-10-04 RX ORDER — ACETAMINOPHEN 160 MG/5ML
650 SOLUTION ORAL EVERY 4 HOURS PRN
Status: DISCONTINUED | OUTPATIENT
Start: 2023-10-05 | End: 2023-10-09 | Stop reason: HOSPADM

## 2023-10-04 RX ORDER — HEPARIN SODIUM 1000 [USP'U]/ML
INJECTION, SOLUTION INTRAVENOUS; SUBCUTANEOUS AS NEEDED
Status: DISCONTINUED | OUTPATIENT
Start: 2023-10-04 | End: 2023-10-04 | Stop reason: SURG

## 2023-10-04 RX ORDER — ONDANSETRON 2 MG/ML
4 INJECTION INTRAMUSCULAR; INTRAVENOUS EVERY 6 HOURS PRN
Status: DISCONTINUED | OUTPATIENT
Start: 2023-10-04 | End: 2023-10-09 | Stop reason: HOSPADM

## 2023-10-04 RX ORDER — NITROGLYCERIN 20 MG/100ML
INJECTION INTRAVENOUS CONTINUOUS PRN
Status: DISCONTINUED | OUTPATIENT
Start: 2023-10-04 | End: 2023-10-04

## 2023-10-04 RX ORDER — NOREPINEPHRINE BITARTRATE 0.03 MG/ML
.02-.2 INJECTION, SOLUTION INTRAVENOUS CONTINUOUS PRN
Status: DISCONTINUED | OUTPATIENT
Start: 2023-10-04 | End: 2023-10-05

## 2023-10-04 RX ORDER — MEPERIDINE HYDROCHLORIDE 25 MG/ML
25 INJECTION INTRAMUSCULAR; INTRAVENOUS; SUBCUTANEOUS EVERY 4 HOURS PRN
Status: ACTIVE | OUTPATIENT
Start: 2023-10-04 | End: 2023-10-05

## 2023-10-04 RX ORDER — CEFAZOLIN SODIUM 2 G/100ML
2000 INJECTION, SOLUTION INTRAVENOUS ONCE
Status: DISCONTINUED | OUTPATIENT
Start: 2023-10-04 | End: 2023-10-04 | Stop reason: HOSPADM

## 2023-10-04 RX ORDER — ALBUMIN, HUMAN INJ 5% 5 %
1000 SOLUTION INTRAVENOUS AS NEEDED
Status: DISPENSED | OUTPATIENT
Start: 2023-10-04 | End: 2023-10-05

## 2023-10-04 RX ORDER — FAMOTIDINE 10 MG/ML
20 INJECTION, SOLUTION INTRAVENOUS ONCE
Status: COMPLETED | OUTPATIENT
Start: 2023-10-04 | End: 2023-10-04

## 2023-10-04 RX ORDER — IBUPROFEN 600 MG/1
1 TABLET ORAL
Status: DISCONTINUED | OUTPATIENT
Start: 2023-10-04 | End: 2023-10-05

## 2023-10-04 RX ORDER — PHENYLEPHRINE HYDROCHLORIDE 10 MG/ML
INJECTION INTRAVENOUS AS NEEDED
Status: DISCONTINUED | OUTPATIENT
Start: 2023-10-04 | End: 2023-10-04 | Stop reason: SURG

## 2023-10-04 RX ORDER — MORPHINE SULFATE 2 MG/ML
4 INJECTION, SOLUTION INTRAMUSCULAR; INTRAVENOUS
Status: DISCONTINUED | OUTPATIENT
Start: 2023-10-04 | End: 2023-10-05

## 2023-10-04 RX ORDER — PROPOFOL 10 MG/ML
VIAL (ML) INTRAVENOUS AS NEEDED
Status: DISCONTINUED | OUTPATIENT
Start: 2023-10-04 | End: 2023-10-04 | Stop reason: SURG

## 2023-10-04 RX ORDER — PROTAMINE SULFATE 10 MG/ML
INJECTION, SOLUTION INTRAVENOUS AS NEEDED
Status: DISCONTINUED | OUTPATIENT
Start: 2023-10-04 | End: 2023-10-04 | Stop reason: SURG

## 2023-10-04 RX ORDER — POTASSIUM CHLORIDE 29.8 MG/ML
20 INJECTION INTRAVENOUS ONCE
Status: COMPLETED | OUTPATIENT
Start: 2023-10-04 | End: 2023-10-04

## 2023-10-04 RX ORDER — CHLORHEXIDINE GLUCONATE ORAL RINSE 1.2 MG/ML
15 SOLUTION DENTAL EVERY 12 HOURS
Status: DISCONTINUED | OUTPATIENT
Start: 2023-10-04 | End: 2023-10-09 | Stop reason: HOSPADM

## 2023-10-04 RX ORDER — HYDROCODONE BITARTRATE AND ACETAMINOPHEN 5; 325 MG/1; MG/1
2 TABLET ORAL EVERY 4 HOURS PRN
Status: DISCONTINUED | OUTPATIENT
Start: 2023-10-04 | End: 2023-10-09 | Stop reason: HOSPADM

## 2023-10-04 RX ORDER — MILRINONE LACTATE 0.2 MG/ML
.25-.375 INJECTION, SOLUTION INTRAVENOUS CONTINUOUS PRN
Status: DISCONTINUED | OUTPATIENT
Start: 2023-10-04 | End: 2023-10-05

## 2023-10-04 RX ORDER — LIDOCAINE HYDROCHLORIDE 10 MG/ML
0.5 INJECTION, SOLUTION INFILTRATION; PERINEURAL ONCE AS NEEDED
Status: DISCONTINUED | OUTPATIENT
Start: 2023-10-04 | End: 2023-10-04 | Stop reason: HOSPADM

## 2023-10-04 RX ORDER — ALUMINA, MAGNESIA, AND SIMETHICONE 2400; 2400; 240 MG/30ML; MG/30ML; MG/30ML
15 SUSPENSION ORAL EVERY 6 HOURS PRN
Status: DISCONTINUED | OUTPATIENT
Start: 2023-10-04 | End: 2023-10-09 | Stop reason: HOSPADM

## 2023-10-04 RX ORDER — NITROGLYCERIN 20 MG/100ML
5-200 INJECTION INTRAVENOUS
Status: DISCONTINUED | OUTPATIENT
Start: 2023-10-04 | End: 2023-10-05

## 2023-10-04 RX ORDER — MORPHINE SULFATE 2 MG/ML
1 INJECTION, SOLUTION INTRAMUSCULAR; INTRAVENOUS EVERY 4 HOURS PRN
Status: DISCONTINUED | OUTPATIENT
Start: 2023-10-04 | End: 2023-10-09 | Stop reason: HOSPADM

## 2023-10-04 RX ORDER — ACETAMINOPHEN 10 MG/ML
1000 INJECTION, SOLUTION INTRAVENOUS EVERY 8 HOURS
Status: COMPLETED | OUTPATIENT
Start: 2023-10-04 | End: 2023-10-05

## 2023-10-04 RX ORDER — BISACODYL 5 MG/1
10 TABLET, DELAYED RELEASE ORAL DAILY PRN
Status: DISCONTINUED | OUTPATIENT
Start: 2023-10-04 | End: 2023-10-09 | Stop reason: HOSPADM

## 2023-10-04 RX ORDER — SODIUM CHLORIDE 9 MG/ML
30 INJECTION, SOLUTION INTRAVENOUS CONTINUOUS
Status: DISCONTINUED | OUTPATIENT
Start: 2023-10-04 | End: 2023-10-09 | Stop reason: HOSPADM

## 2023-10-04 RX ORDER — ROCURONIUM BROMIDE 10 MG/ML
INJECTION, SOLUTION INTRAVENOUS AS NEEDED
Status: DISCONTINUED | OUTPATIENT
Start: 2023-10-04 | End: 2023-10-04 | Stop reason: SURG

## 2023-10-04 RX ORDER — AMOXICILLIN 250 MG
2 CAPSULE ORAL NIGHTLY
Status: DISCONTINUED | OUTPATIENT
Start: 2023-10-05 | End: 2023-10-09 | Stop reason: HOSPADM

## 2023-10-04 RX ORDER — ACETAMINOPHEN 325 MG/1
650 TABLET ORAL EVERY 4 HOURS
Status: ACTIVE | OUTPATIENT
Start: 2023-10-04 | End: 2023-10-05

## 2023-10-04 RX ORDER — PROTAMINE SULFATE 10 MG/ML
INJECTION, SOLUTION INTRAVENOUS AS NEEDED
Status: DISCONTINUED | OUTPATIENT
Start: 2023-10-04 | End: 2023-10-04

## 2023-10-04 RX ORDER — CEFAZOLIN SODIUM 2 G/100ML
2000 INJECTION, SOLUTION INTRAVENOUS EVERY 8 HOURS
Status: COMPLETED | OUTPATIENT
Start: 2023-10-05 | End: 2023-10-06

## 2023-10-04 RX ADMIN — 0.12% CHLORHEXIDINE GLUCONATE 15 ML: 1.2 RINSE ORAL at 08:09

## 2023-10-04 RX ADMIN — PHENYLEPHRINE HYDROCHLORIDE 100 MCG: 10 INJECTION INTRAVENOUS at 13:51

## 2023-10-04 RX ADMIN — NICARDIPINE HYDROCHLORIDE 3 MG/HR: 25 INJECTION, SOLUTION INTRAVENOUS at 14:10

## 2023-10-04 RX ADMIN — METHADONE HYDROCHLORIDE 10 MG: 10 INJECTION, SOLUTION INTRAMUSCULAR; INTRAVENOUS; SUBCUTANEOUS at 14:02

## 2023-10-04 RX ADMIN — DEXMEDETOMIDINE HYDROCHLORIDE 0.1 MCG/KG/HR: 4 INJECTION, SOLUTION INTRAVENOUS at 20:44

## 2023-10-04 RX ADMIN — NICARDIPINE HYDROCHLORIDE 0.2 MG: 25 INJECTION, SOLUTION INTRAVENOUS at 16:23

## 2023-10-04 RX ADMIN — AMINOCAPROIC ACID 10 G: 250 INJECTION, SOLUTION INTRAVENOUS at 13:43

## 2023-10-04 RX ADMIN — NICARDIPINE HYDROCHLORIDE 0.3 MG: 25 INJECTION, SOLUTION INTRAVENOUS at 16:24

## 2023-10-04 RX ADMIN — SODIUM CHLORIDE: 9 INJECTION, SOLUTION INTRAVENOUS at 13:23

## 2023-10-04 RX ADMIN — MIDAZOLAM 1 MG: 1 INJECTION INTRAMUSCULAR; INTRAVENOUS at 09:56

## 2023-10-04 RX ADMIN — NICARDIPINE HYDROCHLORIDE 0.1 MG: 25 INJECTION, SOLUTION INTRAVENOUS at 14:11

## 2023-10-04 RX ADMIN — FENTANYL CITRATE 50 MCG: 50 INJECTION, SOLUTION INTRAMUSCULAR; INTRAVENOUS at 09:33

## 2023-10-04 RX ADMIN — SODIUM CHLORIDE 3.3 UNITS/HR: 9 INJECTION, SOLUTION INTRAVENOUS at 14:43

## 2023-10-04 RX ADMIN — MAGNESIUM SULFATE HEPTAHYDRATE 2 G: 2 INJECTION, SOLUTION INTRAVENOUS at 18:34

## 2023-10-04 RX ADMIN — MIDAZOLAM 1 MG: 1 INJECTION INTRAMUSCULAR; INTRAVENOUS at 09:33

## 2023-10-04 RX ADMIN — ROCURONIUM BROMIDE 100 MG: 10 INJECTION, SOLUTION INTRAVENOUS at 13:34

## 2023-10-04 RX ADMIN — ALBUMIN (HUMAN): 12.5 INJECTION, SOLUTION INTRAVENOUS at 16:22

## 2023-10-04 RX ADMIN — METOCLOPRAMIDE 10 MG: 5 INJECTION, SOLUTION INTRAMUSCULAR; INTRAVENOUS at 18:34

## 2023-10-04 RX ADMIN — MORPHINE SULFATE 2 MG: 2 INJECTION, SOLUTION INTRAMUSCULAR; INTRAVENOUS at 23:24

## 2023-10-04 RX ADMIN — 0.12% CHLORHEXIDINE GLUCONATE 15 ML: 1.2 RINSE ORAL at 18:27

## 2023-10-04 RX ADMIN — ACETAMINOPHEN 1000 MG: 10 INJECTION, SOLUTION INTRAVENOUS at 18:27

## 2023-10-04 RX ADMIN — PANTOPRAZOLE SODIUM 40 MG: 40 INJECTION, POWDER, FOR SOLUTION INTRAVENOUS at 18:25

## 2023-10-04 RX ADMIN — NICARDIPINE HYDROCHLORIDE 0.2 MG: 25 INJECTION, SOLUTION INTRAVENOUS at 16:27

## 2023-10-04 RX ADMIN — NICARDIPINE HYDROCHLORIDE 0.3 MG: 25 INJECTION, SOLUTION INTRAVENOUS at 16:21

## 2023-10-04 RX ADMIN — SODIUM CHLORIDE 1 MCG/KG/HR: 9 INJECTION, SOLUTION INTRAVENOUS at 13:35

## 2023-10-04 RX ADMIN — NICARDIPINE HYDROCHLORIDE 0.5 MG: 25 INJECTION, SOLUTION INTRAVENOUS at 14:01

## 2023-10-04 RX ADMIN — NICARDIPINE HYDROCHLORIDE 0.2 MG: 25 INJECTION, SOLUTION INTRAVENOUS at 14:10

## 2023-10-04 RX ADMIN — MAGNESIUM SULFATE HEPTAHYDRATE 2 G: 500 INJECTION, SOLUTION INTRAMUSCULAR; INTRAVENOUS at 15:31

## 2023-10-04 RX ADMIN — PHENYLEPHRINE HYDROCHLORIDE 100 MCG: 10 INJECTION INTRAVENOUS at 13:52

## 2023-10-04 RX ADMIN — PROPOFOL 50 MCG/KG/MIN: 10 INJECTION, EMULSION INTRAVENOUS at 13:35

## 2023-10-04 RX ADMIN — SODIUM CHLORIDE: 9 INJECTION, SOLUTION INTRAVENOUS at 13:20

## 2023-10-04 RX ADMIN — PHENYLEPHRINE HYDROCHLORIDE 100 MCG: 10 INJECTION INTRAVENOUS at 14:03

## 2023-10-04 RX ADMIN — METOPROLOL TARTRATE 12.5 MG: 25 TABLET, FILM COATED ORAL at 08:07

## 2023-10-04 RX ADMIN — SODIUM CHLORIDE: 9 INJECTION, SOLUTION INTRAVENOUS at 13:16

## 2023-10-04 RX ADMIN — NICARDIPINE HYDROCHLORIDE 0.1 MG: 25 INJECTION, SOLUTION INTRAVENOUS at 14:09

## 2023-10-04 RX ADMIN — AMINOCAPROIC ACID 10 G: 250 INJECTION, SOLUTION INTRAVENOUS at 16:20

## 2023-10-04 RX ADMIN — POTASSIUM CHLORIDE 20 MEQ: 29.8 INJECTION, SOLUTION INTRAVENOUS at 18:12

## 2023-10-04 RX ADMIN — MUPIROCIN 1 APPLICATION: 20 OINTMENT TOPICAL at 20:10

## 2023-10-04 RX ADMIN — NICARDIPINE HYDROCHLORIDE 0.3 MG: 25 INJECTION, SOLUTION INTRAVENOUS at 16:25

## 2023-10-04 RX ADMIN — MUPIROCIN 1 APPLICATION: 20 OINTMENT TOPICAL at 08:05

## 2023-10-04 RX ADMIN — CALCIUM CHLORIDE 0.25 G: 100 INJECTION INTRAVENOUS; INTRAVENTRICULAR at 16:16

## 2023-10-04 RX ADMIN — PROPOFOL 140 MG: 10 INJECTION, EMULSION INTRAVENOUS at 13:34

## 2023-10-04 RX ADMIN — PROTAMINE SULFATE 250 MG: 10 INJECTION, SOLUTION INTRAVENOUS at 16:02

## 2023-10-04 RX ADMIN — HEPARIN SODIUM 25000 UNITS: 1000 INJECTION, SOLUTION INTRAVENOUS; SUBCUTANEOUS at 14:16

## 2023-10-04 RX ADMIN — FAMOTIDINE 20 MG: 10 INJECTION INTRAVENOUS at 08:06

## 2023-10-04 RX ADMIN — CALCIUM CHLORIDE 0.25 G: 100 INJECTION INTRAVENOUS; INTRAVENTRICULAR at 16:04

## 2023-10-04 RX ADMIN — NICARDIPINE HYDROCHLORIDE 0.4 MG: 25 INJECTION, SOLUTION INTRAVENOUS at 16:28

## 2023-10-04 RX ADMIN — ALBUMIN (HUMAN) 250 ML: 12.5 INJECTION, SOLUTION INTRAVENOUS at 20:10

## 2023-10-04 RX ADMIN — CEFAZOLIN SODIUM 2 G: 2 INJECTION, SOLUTION INTRAVENOUS at 13:21

## 2023-10-04 RX ADMIN — ATORVASTATIN CALCIUM 40 MG: 20 TABLET, FILM COATED ORAL at 21:12

## 2023-10-04 RX ADMIN — NOREPINEPHRINE BITARTRATE 0.02 MCG/KG/MIN: 1 SOLUTION INTRAVENOUS at 13:51

## 2023-10-04 RX ADMIN — CEFAZOLIN SODIUM 2 G: 2 INJECTION, SOLUTION INTRAVENOUS at 17:10

## 2023-10-04 RX ADMIN — NICARDIPINE HYDROCHLORIDE 0.4 MG: 25 INJECTION, SOLUTION INTRAVENOUS at 14:44

## 2023-10-04 RX ADMIN — NICARDIPINE HYDROCHLORIDE 0.3 MG: 25 INJECTION, SOLUTION INTRAVENOUS at 16:30

## 2023-10-04 RX ADMIN — ALBUMIN (HUMAN) 250 ML: 12.5 INJECTION, SOLUTION INTRAVENOUS at 21:00

## 2023-10-04 NOTE — ANESTHESIA PROCEDURE NOTES
Diagnostic IntraOp Steve    Procedure Performed: Diagnostic IntraOp Steve       Start Time:        End Time:      Preanesthesia Checklist:  Patient identified, IV assessed, risks and benefits discussed, monitors and equipment assessed, procedure being performed at surgeon's request and anesthesia consent obtained.    General Procedure Information  Diagnostic Indications for Echo:  assessment of ascending aorta, assessment of surgical repair and hemodynamic monitoring  Physician Requesting Echo: Wali Boss MD  CPT Code:  63095, 17194  ICD Code for Medical Necessity:  I70.0  Location performed:  OR  Intubated  Bite block placed  Heart visualized  Probe Insertion:  Easy  Probe Type:  Multiplane  Modalities:  Color flow mapping, pulse wave Doppler and continuous wave Doppler    Echocardiographic and Doppler Measurements    Ventricles    Right Ventricle:  Cavity size normal.  Thrombus not present.    Left Ventricle:  Cavity size normal.  Thrombus not present.  Global Function normal.  Ejection Fraction 62%.          Valves    Aortic Valve:  Annulus normal.  Stenosis severe.  Area: .45 cm².  Mean Gradient: 33 mmHg.  Regurgitation moderate.  Leaflets calcified and thickened.  Leaflet motions restricted.  Specific leaflet segments with abnormal motions are described in the following comments:       LCC, RCC    Mitral Valve:  Annulus normal.  Stenosis not present.  Mean Gradient: 1 mmHg.  Regurgitation mild.  Leaflets normal.  Leaflet motions normal.      Tricuspid Valve:  Annulus normal.  Stenosis not present.  Regurgitation trace.  Leaflets normal.  Leaflet motions normal.    Pulmonic Valve:  Annulus normal.  Stenosis not present.  Regurgitation absent.        Aorta    Ascending Aorta:  Size normal.  Diameter 2.8 cm.  Dissection not present.  Plaque thickness less than 3 mm.  Mobile plaque not present.    Aortic Arch:  Size normal.  Diameter 1.9 cm.  Dissection not present.  Plaque thickness less than 3 mm.  Mobile  no plaque not present.    Descending Aorta:  Diameter 1.7 cm.  Dissection not present.  Plaque thickness less than 3 mm.  Mobile plaque not present.        Atria    Right Atrium:  Size normal.  Spontaneous echo contrast not present.  Thrombus not present.  Tumor not present.  Device not present.      Left Atrium:  Size normal.  Spontaneous echo contrast not present.  Thrombus not present.  Tumor not present.  Device not present.    Left atrial appendage normal.          Diastolic Function Measurements:  Diastolic Dysfunction Grade= I  E=  29.9 ms  A=  40.1 ms  E/A Ratio=  .7  DT=  199 ms  S/D=  1.6  IVRT=    Other Findings  Pericardium:  normal  Pleural Effusion:  none  Pulmonary Venous Flow:  normal    Anesthesia Information  Performed Personally  Anesthesiologist:  Joo Daniel MD      Echocardiogram Comments:       Postbypass results:  AV s/p new bioprosthetic valve mean gradient 14 mmHg no perivalvular leak no AI   MV mild MR no MS mean gradient 2 mmHg  TV mild TR no TS   LVEF 65% no RWMAS   Normal RHF

## 2023-10-04 NOTE — ANESTHESIA PROCEDURE NOTES
Airway  Urgency: elective    Date/Time: 10/4/2023 1:39 PM  Airway not difficult    General Information and Staff    Patient location during procedure: OR  Anesthesiologist: Joo Daniel MD    Indications and Patient Condition  Indications for airway management: airway protection    Preoxygenated: yes  Mask difficulty assessment: 1 - vent by mask    Final Airway Details  Final airway type: endotracheal airway      Successful airway: Microcuff Subglottic Suctioning ETT  Cuffed: yes   Successful intubation technique: direct laryngoscopy  Facilitating devices/methods: intubating stylet  Endotracheal tube insertion site: oral  Blade: Joselin  Blade size: 4  Cormack-Lehane Classification: grade IIa - partial view of glottis  Placement verified by: chest auscultation   Measured from: lips  ETT/EBT  to lips (cm): 21  Number of attempts at approach: 1  Assessment: lips, teeth, and gum same as pre-op and atraumatic intubation

## 2023-10-04 NOTE — ANESTHESIA PROCEDURE NOTES
Central Line      Patient reassessed immediately prior to procedure    Stop Time:10/4/2023 10:30 AM  Staff  Anesthesiologist: Melina Johnston MD  Preanesthetic Checklist  Completed: patient identified, IV checked, site marked, risks and benefits discussed, surgical consent and pre-op evaluation  Central Line Prep  Sterile Tech:cap, gloves, gown, mask and sterile barriers  Prep: chloraprep  Patient monitoring: continuous pulse oximetry, EKG and blood pressure monitoring  Central Line Procedure  Laterality:right  Location:internal jugular  Catheter Type:Marinette-Renetta  Catheter Size:9 Fr  Guidance:ultrasound guided  PROCEDURE NOTE/ULTRASOUND INTERPRETATION.  Using ultrasound guidance the potential vascular sites for insertion of the catheter were visualized to determine the patency of the vessel to be used for vascular access.  After selecting the appropriate site for insertion, the needle was visualized under ultrasound being inserted into the internal jugular vein, followed by ultrasound confirmation of wire and catheter placement. There were no abnormalities seen on ultrasound; an image was taken; and the patient tolerated the procedure with no complications. Images: still images obtained, printed/placed on chart  Assessment  Post procedure:biopatch applied, line sutured and occlusive dressing applied  Assessement:chest x-ray ordered, blood return through all ports and free fluid flow  Complications:no  Patient Tolerance:patient tolerated the procedure well with no apparent complications           Clear

## 2023-10-04 NOTE — ANESTHESIA PROCEDURE NOTES
Arterial Line      Patient location during procedure: holding area  Stop Time:10/4/2023 12:22 PM       Line placed for hemodynamic monitoring.  Performed By   Anesthesiologist: Melina Johnston MD   Preanesthetic Checklist  Completed: patient identified, IV checked, site marked, risks and benefits discussed, monitors and equipment checked and pre-op evaluation  Arterial Line Prep    Sterile Tech: mask, sterile barriers, gloves, cap and gown  Prep: ChloraPrep  Patient monitoring: blood pressure monitoring, continuous pulse oximetry and EKG  Arterial Line Procedure   Laterality:left  Location:  radial artery  Catheter size: 20 G   Guidance: ultrasound guided  PROCEDURE NOTE/ULTRASOUND INTERPRETATION.  Using ultrasound guidance the potential vascular sites for insertion of the catheter were visualized to determine the patency of the vessel to be used for vascular access.  After selecting the appropriate site for insertion, the needle was visualized under ultrasound being inserted into the radial artery, followed by ultrasound confirmation of wire and catheter placement. There were no abnormalities seen on ultrasound; an image was taken; and the patient tolerated the procedure with no complications.   Number of attempts: 1  Successful placement: yes Images: still images obtained, printed/placed on the chart  Post Assessment   Dressing Type: occlusive dressing applied, secured with tape and wrist guard applied.   Complications no  Circ/Move/Sens Assessment: normal.   Patient Tolerance: patient tolerated the procedure well with no apparent complications

## 2023-10-05 ENCOUNTER — APPOINTMENT (OUTPATIENT)
Dept: GENERAL RADIOLOGY | Facility: HOSPITAL | Age: 73
DRG: 221 | End: 2023-10-05
Payer: MEDICARE

## 2023-10-05 LAB
A-A DO2: 217.8 MMHG
ANION GAP SERPL CALCULATED.3IONS-SCNC: 7.6 MMOL/L (ref 5–15)
ARTERIAL PATENCY WRIST A: ABNORMAL
ATMOSPHERIC PRESS: 750 MMHG
BASE EXCESS BLDA CALC-SCNC: 1.7 MMOL/L (ref 0–2)
BASOPHILS # BLD AUTO: 0.02 10*3/MM3 (ref 0–0.2)
BASOPHILS NFR BLD AUTO: 0.2 % (ref 0–1.5)
BDY SITE: ABNORMAL
BUN SERPL-MCNC: 10 MG/DL (ref 8–23)
BUN/CREAT SERPL: 15.2 (ref 7–25)
CA-I BLD-MCNC: 4.6 MG/DL (ref 4.6–5.4)
CA-I SERPL ISE-MCNC: 1.15 MMOL/L (ref 1.15–1.35)
CALCIUM SPEC-SCNC: 8.3 MG/DL (ref 8.6–10.5)
CHLORIDE SERPL-SCNC: 111 MMOL/L (ref 98–107)
CO2 SERPL-SCNC: 25.4 MMOL/L (ref 22–29)
CREAT SERPL-MCNC: 0.66 MG/DL (ref 0.57–1)
DEPRECATED RDW RBC AUTO: 42.9 FL (ref 37–54)
DEPRECATED RDW RBC AUTO: 44.3 FL (ref 37–54)
EGFRCR SERPLBLD CKD-EPI 2021: 93.3 ML/MIN/1.73
EOSINOPHIL # BLD AUTO: 0 10*3/MM3 (ref 0–0.4)
EOSINOPHIL NFR BLD AUTO: 0 % (ref 0.3–6.2)
ERYTHROCYTE [DISTWIDTH] IN BLOOD BY AUTOMATED COUNT: 14.2 % (ref 12.3–15.4)
ERYTHROCYTE [DISTWIDTH] IN BLOOD BY AUTOMATED COUNT: 14.3 % (ref 12.3–15.4)
GLUCOSE BLDC GLUCOMTR-MCNC: 113 MG/DL (ref 70–130)
GLUCOSE BLDC GLUCOMTR-MCNC: 120 MG/DL (ref 70–130)
GLUCOSE BLDC GLUCOMTR-MCNC: 123 MG/DL (ref 70–130)
GLUCOSE BLDC GLUCOMTR-MCNC: 145 MG/DL (ref 70–130)
GLUCOSE BLDC GLUCOMTR-MCNC: 150 MG/DL (ref 70–130)
GLUCOSE BLDC GLUCOMTR-MCNC: 155 MG/DL (ref 70–130)
GLUCOSE BLDC GLUCOMTR-MCNC: 157 MG/DL (ref 70–130)
GLUCOSE BLDC GLUCOMTR-MCNC: 159 MG/DL (ref 70–130)
GLUCOSE BLDC GLUCOMTR-MCNC: 164 MG/DL (ref 70–130)
GLUCOSE SERPL-MCNC: 146 MG/DL (ref 65–99)
HCO3 BLDA-SCNC: 27 MMOL/L (ref 22–28)
HCT VFR BLD AUTO: 34.2 % (ref 34–46.6)
HCT VFR BLD AUTO: 34.7 % (ref 34–46.6)
HEMODILUTION: NO
HGB BLD-MCNC: 11.2 G/DL (ref 12–15.9)
HGB BLD-MCNC: 11.3 G/DL (ref 12–15.9)
IMM GRANULOCYTES # BLD AUTO: 0.06 10*3/MM3 (ref 0–0.05)
IMM GRANULOCYTES NFR BLD AUTO: 0.7 % (ref 0–0.5)
INHALED O2 CONCENTRATION: 100 %
INR PPP: 1.21 (ref 0.9–1.1)
LYMPHOCYTES # BLD AUTO: 0.81 10*3/MM3 (ref 0.7–3.1)
LYMPHOCYTES NFR BLD AUTO: 9.5 % (ref 19.6–45.3)
MCH RBC QN AUTO: 27.4 PG (ref 26.6–33)
MCH RBC QN AUTO: 27.7 PG (ref 26.6–33)
MCHC RBC AUTO-ENTMCNC: 32.3 G/DL (ref 31.5–35.7)
MCHC RBC AUTO-ENTMCNC: 33 G/DL (ref 31.5–35.7)
MCV RBC AUTO: 83.8 FL (ref 79–97)
MCV RBC AUTO: 84.8 FL (ref 79–97)
MODALITY: ABNORMAL
MONOCYTES # BLD AUTO: 0.57 10*3/MM3 (ref 0.1–0.9)
MONOCYTES NFR BLD AUTO: 6.7 % (ref 5–12)
NEUTROPHILS NFR BLD AUTO: 7.09 10*3/MM3 (ref 1.7–7)
NEUTROPHILS NFR BLD AUTO: 82.9 % (ref 42.7–76)
NRBC BLD AUTO-RTO: 0 /100 WBC (ref 0–0.2)
O2 A-A PPRESDIFF RESPIRATORY: 0.6 MMHG
PCO2 BLDA: 44.1 MM HG (ref 35–45)
PEEP RESPIRATORY: 8 CM[H2O]
PH BLDA: 7.39 PH UNITS (ref 7.35–7.45)
PLATELET # BLD AUTO: 106 10*3/MM3 (ref 140–450)
PLATELET # BLD AUTO: 108 10*3/MM3 (ref 140–450)
PMV BLD AUTO: 9.4 FL (ref 6–12)
PMV BLD AUTO: 9.9 FL (ref 6–12)
PO2 BLDA: 441 MM HG (ref 80–100)
POTASSIUM SERPL-SCNC: 4.1 MMOL/L (ref 3.5–5.2)
PROTHROMBIN TIME: 15.4 SECONDS (ref 11.7–14.2)
QT INTERVAL: 417 MS
QT INTERVAL: 424 MS
QTC INTERVAL: 465 MS
QTC INTERVAL: 487 MS
RBC # BLD AUTO: 4.08 10*6/MM3 (ref 3.77–5.28)
RBC # BLD AUTO: 4.09 10*6/MM3 (ref 3.77–5.28)
SAO2 % BLDCOA: 100 % (ref 92–98.5)
SET MECH RESP RATE: 15
SODIUM SERPL-SCNC: 144 MMOL/L (ref 136–145)
TOTAL RATE: 15 BREATHS/MINUTE
VENTILATOR MODE: ABNORMAL
VT ON VENT VENT: 500 ML
WBC NRBC COR # BLD: 8.55 10*3/MM3 (ref 3.4–10.8)
WBC NRBC COR # BLD: 8.64 10*3/MM3 (ref 3.4–10.8)

## 2023-10-05 PROCEDURE — 25010000002 CALCIUM GLUCONATE 2-0.675 GM/100ML-% SOLUTION: Performed by: NURSE PRACTITIONER

## 2023-10-05 PROCEDURE — 97162 PT EVAL MOD COMPLEX 30 MIN: CPT

## 2023-10-05 PROCEDURE — 94664 DEMO&/EVAL PT USE INHALER: CPT

## 2023-10-05 PROCEDURE — 25010000002 ONDANSETRON PER 1 MG: Performed by: NURSE PRACTITIONER

## 2023-10-05 PROCEDURE — 25010000002 FUROSEMIDE PER 20 MG: Performed by: NURSE PRACTITIONER

## 2023-10-05 PROCEDURE — 85610 PROTHROMBIN TIME: CPT | Performed by: NURSE PRACTITIONER

## 2023-10-05 PROCEDURE — 85025 COMPLETE CBC W/AUTO DIFF WBC: CPT | Performed by: NURSE PRACTITIONER

## 2023-10-05 PROCEDURE — 63710000001 INSULIN LISPRO (HUMAN) PER 5 UNITS: Performed by: NURSE PRACTITIONER

## 2023-10-05 PROCEDURE — 71045 X-RAY EXAM CHEST 1 VIEW: CPT

## 2023-10-05 PROCEDURE — 25010000002 MAGNESIUM SULFATE 2 GM/50ML SOLUTION: Performed by: NURSE PRACTITIONER

## 2023-10-05 PROCEDURE — 25010000002 METOCLOPRAMIDE PER 10 MG: Performed by: NURSE PRACTITIONER

## 2023-10-05 PROCEDURE — 99221 1ST HOSP IP/OBS SF/LOW 40: CPT | Performed by: INTERNAL MEDICINE

## 2023-10-05 PROCEDURE — 99024 POSTOP FOLLOW-UP VISIT: CPT | Performed by: THORACIC SURGERY (CARDIOTHORACIC VASCULAR SURGERY)

## 2023-10-05 PROCEDURE — 82948 REAGENT STRIP/BLOOD GLUCOSE: CPT

## 2023-10-05 PROCEDURE — 97530 THERAPEUTIC ACTIVITIES: CPT

## 2023-10-05 PROCEDURE — 80048 BASIC METABOLIC PNL TOTAL CA: CPT | Performed by: THORACIC SURGERY (CARDIOTHORACIC VASCULAR SURGERY)

## 2023-10-05 PROCEDURE — 25010000002 ACETAMINOPHEN 10 MG/ML SOLUTION: Performed by: NURSE PRACTITIONER

## 2023-10-05 PROCEDURE — 25010000002 ENOXAPARIN PER 10 MG: Performed by: NURSE PRACTITIONER

## 2023-10-05 PROCEDURE — 94799 UNLISTED PULMONARY SVC/PX: CPT

## 2023-10-05 PROCEDURE — 93005 ELECTROCARDIOGRAM TRACING: CPT | Performed by: NURSE PRACTITIONER

## 2023-10-05 PROCEDURE — 25010000002 CEFAZOLIN IN DEXTROSE 2-4 GM/100ML-% SOLUTION: Performed by: NURSE PRACTITIONER

## 2023-10-05 PROCEDURE — 93010 ELECTROCARDIOGRAM REPORT: CPT | Performed by: INTERNAL MEDICINE

## 2023-10-05 PROCEDURE — 82330 ASSAY OF CALCIUM: CPT | Performed by: NURSE PRACTITIONER

## 2023-10-05 PROCEDURE — 25010000002 MORPHINE PER 10 MG: Performed by: NURSE PRACTITIONER

## 2023-10-05 RX ORDER — FUROSEMIDE 10 MG/ML
40 INJECTION INTRAMUSCULAR; INTRAVENOUS ONCE
Status: COMPLETED | OUTPATIENT
Start: 2023-10-05 | End: 2023-10-05

## 2023-10-05 RX ORDER — INSULIN LISPRO 100 [IU]/ML
2-9 INJECTION, SOLUTION INTRAVENOUS; SUBCUTANEOUS
Status: DISCONTINUED | OUTPATIENT
Start: 2023-10-05 | End: 2023-10-09 | Stop reason: HOSPADM

## 2023-10-05 RX ORDER — POTASSIUM CHLORIDE 750 MG/1
20 TABLET, FILM COATED, EXTENDED RELEASE ORAL ONCE
Status: COMPLETED | OUTPATIENT
Start: 2023-10-05 | End: 2023-10-05

## 2023-10-05 RX ORDER — GUAIFENESIN 600 MG/1
1200 TABLET, EXTENDED RELEASE ORAL EVERY 12 HOURS SCHEDULED
Status: DISCONTINUED | OUTPATIENT
Start: 2023-10-05 | End: 2023-10-09 | Stop reason: HOSPADM

## 2023-10-05 RX ORDER — DEXTROSE MONOHYDRATE 25 G/50ML
25 INJECTION, SOLUTION INTRAVENOUS
Status: DISCONTINUED | OUTPATIENT
Start: 2023-10-05 | End: 2023-10-09 | Stop reason: HOSPADM

## 2023-10-05 RX ORDER — NICOTINE POLACRILEX 4 MG
15 LOZENGE BUCCAL
Status: DISCONTINUED | OUTPATIENT
Start: 2023-10-05 | End: 2023-10-09 | Stop reason: HOSPADM

## 2023-10-05 RX ORDER — IBUPROFEN 600 MG/1
1 TABLET ORAL
Status: DISCONTINUED | OUTPATIENT
Start: 2023-10-05 | End: 2023-10-09 | Stop reason: HOSPADM

## 2023-10-05 RX ORDER — CALCIUM GLUCONATE 20 MG/ML
2000 INJECTION, SOLUTION INTRAVENOUS ONCE
Status: COMPLETED | OUTPATIENT
Start: 2023-10-05 | End: 2023-10-05

## 2023-10-05 RX ADMIN — ENOXAPARIN SODIUM 40 MG: 100 INJECTION SUBCUTANEOUS at 18:04

## 2023-10-05 RX ADMIN — HYDROCODONE BITARTRATE AND ACETAMINOPHEN 2 TABLET: 5; 325 TABLET ORAL at 18:04

## 2023-10-05 RX ADMIN — MORPHINE SULFATE 1 MG: 2 INJECTION, SOLUTION INTRAMUSCULAR; INTRAVENOUS at 03:11

## 2023-10-05 RX ADMIN — ONDANSETRON 4 MG: 2 INJECTION INTRAMUSCULAR; INTRAVENOUS at 18:36

## 2023-10-05 RX ADMIN — MAGNESIUM SULFATE HEPTAHYDRATE 2 G: 2 INJECTION, SOLUTION INTRAVENOUS at 09:28

## 2023-10-05 RX ADMIN — MUPIROCIN 1 APPLICATION: 20 OINTMENT TOPICAL at 21:13

## 2023-10-05 RX ADMIN — ATORVASTATIN CALCIUM 40 MG: 20 TABLET, FILM COATED ORAL at 21:00

## 2023-10-05 RX ADMIN — 0.12% CHLORHEXIDINE GLUCONATE 15 ML: 1.2 RINSE ORAL at 06:18

## 2023-10-05 RX ADMIN — OXYCODONE HYDROCHLORIDE 10 MG: 5 TABLET ORAL at 04:44

## 2023-10-05 RX ADMIN — SENNOSIDES AND DOCUSATE SODIUM 2 TABLET: 50; 8.6 TABLET ORAL at 21:00

## 2023-10-05 RX ADMIN — METOPROLOL TARTRATE 25 MG: 25 TABLET, FILM COATED ORAL at 08:26

## 2023-10-05 RX ADMIN — OXYCODONE HYDROCHLORIDE 10 MG: 5 TABLET ORAL at 08:44

## 2023-10-05 RX ADMIN — FUROSEMIDE 40 MG: 10 INJECTION, SOLUTION INTRAMUSCULAR; INTRAVENOUS at 08:26

## 2023-10-05 RX ADMIN — CEFAZOLIN SODIUM 2000 MG: 2 INJECTION, SOLUTION INTRAVENOUS at 00:39

## 2023-10-05 RX ADMIN — CYCLOBENZAPRINE 10 MG: 10 TABLET, FILM COATED ORAL at 00:19

## 2023-10-05 RX ADMIN — MAGNESIUM SULFATE HEPTAHYDRATE 2 G: 2 INJECTION, SOLUTION INTRAVENOUS at 01:20

## 2023-10-05 RX ADMIN — ACETAMINOPHEN 1000 MG: 10 INJECTION, SOLUTION INTRAVENOUS at 01:20

## 2023-10-05 RX ADMIN — GUAIFENESIN 1200 MG: 600 TABLET, EXTENDED RELEASE ORAL at 08:25

## 2023-10-05 RX ADMIN — CEFAZOLIN SODIUM 2000 MG: 2 INJECTION, SOLUTION INTRAVENOUS at 17:42

## 2023-10-05 RX ADMIN — OXYCODONE HYDROCHLORIDE 10 MG: 5 TABLET ORAL at 00:19

## 2023-10-05 RX ADMIN — CALCIUM GLUCONATE 2000 MG: 20 INJECTION, SOLUTION INTRAVENOUS at 08:26

## 2023-10-05 RX ADMIN — METOCLOPRAMIDE 10 MG: 5 INJECTION, SOLUTION INTRAMUSCULAR; INTRAVENOUS at 12:59

## 2023-10-05 RX ADMIN — MUPIROCIN 1 APPLICATION: 20 OINTMENT TOPICAL at 08:26

## 2023-10-05 RX ADMIN — INSULIN LISPRO 2 UNITS: 100 INJECTION, SOLUTION INTRAVENOUS; SUBCUTANEOUS at 21:00

## 2023-10-05 RX ADMIN — 0.12% CHLORHEXIDINE GLUCONATE 15 ML: 1.2 RINSE ORAL at 18:04

## 2023-10-05 RX ADMIN — CYCLOBENZAPRINE 10 MG: 10 TABLET, FILM COATED ORAL at 08:35

## 2023-10-05 RX ADMIN — INSULIN LISPRO 2 UNITS: 100 INJECTION, SOLUTION INTRAVENOUS; SUBCUTANEOUS at 09:28

## 2023-10-05 RX ADMIN — CEFAZOLIN SODIUM 2000 MG: 2 INJECTION, SOLUTION INTRAVENOUS at 08:35

## 2023-10-05 RX ADMIN — METOPROLOL TARTRATE 25 MG: 25 TABLET, FILM COATED ORAL at 21:01

## 2023-10-05 RX ADMIN — GUAIFENESIN 1200 MG: 600 TABLET, EXTENDED RELEASE ORAL at 21:00

## 2023-10-05 RX ADMIN — OXYCODONE HYDROCHLORIDE 10 MG: 5 TABLET ORAL at 13:17

## 2023-10-05 RX ADMIN — INSULIN LISPRO 2 UNITS: 100 INJECTION, SOLUTION INTRAVENOUS; SUBCUTANEOUS at 13:47

## 2023-10-05 RX ADMIN — ACETAMINOPHEN 1000 MG: 10 INJECTION, SOLUTION INTRAVENOUS at 09:29

## 2023-10-05 RX ADMIN — OXYCODONE HYDROCHLORIDE 10 MG: 5 TABLET ORAL at 22:45

## 2023-10-05 RX ADMIN — POTASSIUM CHLORIDE 20 MEQ: 750 TABLET, EXTENDED RELEASE ORAL at 08:26

## 2023-10-05 RX ADMIN — ASPIRIN 81 MG: 81 TABLET, COATED ORAL at 08:25

## 2023-10-05 RX ADMIN — PANTOPRAZOLE SODIUM 40 MG: 40 TABLET, DELAYED RELEASE ORAL at 06:18

## 2023-10-05 NOTE — PLAN OF CARE
Goal Outcome Evaluation:  Plan of Care Reviewed With: patient           Outcome Evaluation: Pt is 73 yo female seen s/p mini-sternotomy AVR. PMH significant for HTN, obesity, breast cancer, TIA. Patient lives alone, independent prior to admission, lives in tri level home with multiple stairs. Today, patient reports pain at 6/10 level, performed 5 reps per cardiac protocol, sit to stand with CGA, ambulated about 60 feet with CGA with assist for lines/tubes. Patient demonstrates impairments consisting of generalized post op weakness and pain, decreased activity tolerance and would benefit from skilled PT. d/c plans are home with HH.      Anticipated Discharge Disposition (PT): home with assist, home with home health

## 2023-10-05 NOTE — PROGRESS NOTES
" LOS: 1 day   Patient Care Team:  Maria Ines Nguyen APRN as PCP - General (Nurse Practitioner)  Hayes Jones MD (Cardiology)  Joo Tovra MD as Cardiologist (Cardiology)  Ila Sharma MD PhD as Consulting Physician (Hematology and Oncology)  Ortiz Jorgensen MD as Consulting Physician (Cardiology)    Chief Complaint: post op    Subjective      Vital Signs  Temp:  [98.6 øF (37 øC)-100 øF (37.8 øC)] 98.6 øF (37 øC)  Heart Rate:  [64-79] 77  Resp:  [15-16] 16  BP: ()/(48-74) 117/49  Arterial Line BP: (153)/(63) 153/63  FiO2 (%):  [40 %-98 %] 40 %  Body mass index is 32.92 kg/mý.    Intake/Output Summary (Last 24 hours) at 10/5/2023 0723  Last data filed at 10/5/2023 0700  Gross per 24 hour   Intake 3390 ml   Output 2205 ml   Net 1185 ml     No intake/output data recorded.    Chest tube drainage last 8 hours85        10/05/23  0600   Weight: 87 kg (191 lb 12.8 oz)         Objective:  Vital signs: (most recent): Blood pressure 117/49, pulse 77, temperature 98.6 øF (37 øC), temperature source Core, resp. rate 16, height 162.6 cm (64\"), weight 87 kg (191 lb 12.8 oz), SpO2 99 %, not currently breastfeeding.              Results Review:        WBC WBC   Date Value Ref Range Status   10/05/2023 8.55 3.40 - 10.80 10*3/mm3 Final   10/04/2023 8.64 3.40 - 10.80 10*3/mm3 Final   10/04/2023 8.98 3.40 - 10.80 10*3/mm3 Final   10/04/2023 9.85 3.40 - 10.80 10*3/mm3 Final      HGB Hemoglobin   Date Value Ref Range Status   10/05/2023 11.2 (L) 12.0 - 15.9 g/dL Final   10/04/2023 11.3 (L) 12.0 - 15.9 g/dL Final   10/04/2023 11.4 (L) 12.0 - 15.9 g/dL Final   10/04/2023 11.8 (L) 12.0 - 15.9 g/dL Final   10/04/2023 9.5 (L) 12.0 - 17.0 g/dL Final   10/04/2023 10.2 (L) 12.0 - 17.0 g/dL Final   10/04/2023 9.9 (L) 12.0 - 17.0 g/dL Final   10/04/2023 8.8 (L) 12.0 - 17.0 g/dL Final   10/04/2023 8.8 (L) 12.0 - 17.0 g/dL Final   10/04/2023 13.3 12.0 - 17.0 g/dL Final      HCT Hematocrit   Date Value Ref Range Status   10/05/2023 " 34.7 34.0 - 46.6 % Final   10/04/2023 34.2 34.0 - 46.6 % Final   10/04/2023 35.8 34.0 - 46.6 % Final   10/04/2023 35.4 34.0 - 46.6 % Final   10/04/2023 28 (L) 38 - 51 % Final   10/04/2023 30 (L) 38 - 51 % Final   10/04/2023 29 (L) 38 - 51 % Final   10/04/2023 26 (L) 38 - 51 % Final   10/04/2023 26 (L) 38 - 51 % Final   10/04/2023 39 38 - 51 % Final      Platelets Platelets   Date Value Ref Range Status   10/05/2023 106 (L) 140 - 450 10*3/mm3 Final   10/04/2023 108 (L) 140 - 450 10*3/mm3 Final   10/04/2023 113 (L) 140 - 450 10*3/mm3 Final   10/04/2023 138 (L) 140 - 450 10*3/mm3 Final        PT/INR:    Protime   Date Value Ref Range Status   10/05/2023 15.4 (H) 11.7 - 14.2 Seconds Final   10/04/2023 16.7 (H) 11.7 - 14.2 Seconds Final   /  INR   Date Value Ref Range Status   10/05/2023 1.21 (H) 0.90 - 1.10 Final   10/04/2023 1.34 (H) 0.90 - 1.10 Final       Sodium Sodium   Date Value Ref Range Status   10/05/2023 144 136 - 145 mmol/L Final   10/04/2023 144 136 - 145 mmol/L Final   10/04/2023 145 136 - 145 mmol/L Final      Potassium Potassium   Date Value Ref Range Status   10/05/2023 4.1 3.5 - 5.2 mmol/L Final   10/04/2023 4.6 3.5 - 5.2 mmol/L Final   10/04/2023 3.8 3.5 - 5.2 mmol/L Final     Comment:     Slight hemolysis detected by analyzer. Results may be affected.      Chloride Chloride   Date Value Ref Range Status   10/05/2023 111 (H) 98 - 107 mmol/L Final   10/04/2023 112 (H) 98 - 107 mmol/L Final   10/04/2023 112 (H) 98 - 107 mmol/L Final      Bicarbonate CO2   Date Value Ref Range Status   10/05/2023 25.4 22.0 - 29.0 mmol/L Final   10/04/2023 23.0 22.0 - 29.0 mmol/L Final   10/04/2023 26.2 22.0 - 29.0 mmol/L Final      BUN BUN   Date Value Ref Range Status   10/05/2023 10 8 - 23 mg/dL Final   10/04/2023 11 8 - 23 mg/dL Final   10/04/2023 12 8 - 23 mg/dL Final      Creatinine Creatinine   Date Value Ref Range Status   10/05/2023 0.66 0.57 - 1.00 mg/dL Final   10/04/2023 0.71 0.57 - 1.00 mg/dL Final    10/04/2023 0.69 0.60 - 130.00 mg/dL Final   10/04/2023 0.74 0.57 - 1.00 mg/dL Final      Calcium Calcium   Date Value Ref Range Status   10/05/2023 8.3 (L) 8.6 - 10.5 mg/dL Final   10/04/2023 8.4 (L) 8.6 - 10.5 mg/dL Final   10/04/2023 9.2 8.6 - 10.5 mg/dL Final      Magnesium Magnesium   Date Value Ref Range Status   10/04/2023 3.0 (H) 1.6 - 2.4 mg/dL Final   10/04/2023 2.8 (H) 1.6 - 2.4 mg/dL Final          acetaminophen, 1,000 mg, Intravenous, Q8H  acetaminophen, 650 mg, Oral, Q4H   Or  acetaminophen, 650 mg, Oral, Q4H   Or  acetaminophen, 650 mg, Rectal, Q4H  aspirin, 81 mg, Oral, Daily  atorvastatin, 40 mg, Oral, Nightly  ceFAZolin, 2,000 mg, Intravenous, Q8H  chlorhexidine, 15 mL, Mouth/Throat, Q12H  enoxaparin, 40 mg, Subcutaneous, Daily  magnesium sulfate, 2 g, Intravenous, Q8H  metoclopramide, 10 mg, Intravenous, Q6H  metoprolol tartrate, 12.5 mg, Oral, Q12H  mupirocin, , Each Nare, BID  pantoprazole, 40 mg, Oral, QAM  senna-docusate sodium, 2 tablet, Oral, Nightly      clevidipine, 2-32 mg/hr  dexmedetomidine, 0.2-1.5 mcg/kg/hr, Last Rate: Stopped (10/05/23 0400)  DOPamine, 2-20 mcg/kg/min  EPINEPHrine, 0.02-0.1 mcg/kg/min  insulin, 0-100 Units/hr, Last Rate: 1.8 Units/hr (10/05/23 0600)  milrinone, 0.25-0.375 mcg/kg/min  niCARdipine, 5-15 mg/hr, Last Rate: Stopped (10/04/23 1843)  nitroglycerin, 5-200 mcg/min  norepinephrine, 0.02-0.2 mcg/kg/min  phenylephrine, 0.2-2 mcg/kg/min  propofol, 5-50 mcg/kg/min, Last Rate: 25 mcg/kg/min (10/04/23 1810)  sodium chloride, 30 mL/hr, Last Rate: 30 mL/hr (10/04/23 1800)              Nonrheumatic aortic valve stenosis    Aortic stenosis      Assessment & Plan    -Severe aortic stenosis- s/p mini sternotomy AVR (tissue), annular rook enlargement with pericardial patch and the modified nicks procedure-- POD#1 Pagni  -Hypertension  -Obesity   -TCP- plt count 106    Looks good this morning  Up in the chair  2L NC--wean as able  Sinus rhythm rat 70s-- blood pressure  should tolerate increase in beta blocker  CXR with increased congestion-- IV diurese  Encourage pulmonary toilet-- add mucinex and flutter valve  Mobilize  Discontinue swan and christopher  Continue routine care         CARLOS Malcolm  10/05/23  07:23 EDT

## 2023-10-05 NOTE — ANESTHESIA POSTPROCEDURE EVALUATION
"Patient: Melina Martin    Procedure Summary       Date: 10/04/23 Room / Location: [place] [address] / [facility] CARDIOVASCULAR OPERATING ROOM    Anesthesia Start: 1315 Anesthesia Stop: 1725    Procedure: CHELA; AORTIC VALVE REPAIR/REPLACEMENT with MINI J STERNOTOMY; ANWALL ROOT ENLARGEMENT; TRANSESOPHAGEAL ECHOCARDIOGRAM WITH ANESTHESIA (Chest) Diagnosis:       Nonrheumatic aortic valve stenosis      (Nonrheumatic aortic valve stenosis [I35.0])    Surgeons: Wali Boss MD Provider: Joo Daniel MD    Anesthesia Type: general ASA Status: 4            Anesthesia Type: general    Vitals  Vitals Value Taken Time   /55 10/04/23 2031   Temp 37.4 °C (99.32 °F) 10/04/23 2043   Pulse 71 10/04/23 2043   Resp 15 10/04/23 1724   SpO2 100 % 10/04/23 2043   Vitals shown include unvalidated device data.        Post Anesthesia Care and Evaluation    Level of consciousness: responsive to noxious stimuli  Pain management: adequate    Airway patency: patent  Anesthetic complications: No anesthetic complications  PONV Status: none  Cardiovascular status: acceptable  Respiratory status: intubated, ETT and ventilator  Hydration status: acceptable    Comments: BP 94/48 (BP Location: Right arm, Patient Position: Lying)   Pulse 72   Temp 37.6 °C (99.7 °F) (Core)   Resp 15   Ht 162.6 cm (64\")   LMP  (LMP Unknown)   SpO2 100%   BMI 30.38 kg/m²       "

## 2023-10-05 NOTE — CONSULTS
[facility]  Consult Note                                                                              10/5/2023  Wali Boss MD    Patient Identification:  Melina Martin:   72 y.o.  female  1950     Date of Admission:10/4/2023    CC: non-rheumatic aortic stenosis     History of Present Illness:   Ms. Melina Martin is a 72 year old patient with a past medical history significant for severe non-rheumatic aortic stenosis, HTN, HLD, mild obesity, sinus infections, and left sided breast cancer s/p lumpectomy and subsequent radiation. She has remained on anastrozole. She did not require chemotherapy.     She last saw Dr. Jorgensen in the office on 8/4/23 for a second opinion regarding aortic stenosis. A stress test and ECHO were ordered and coronary angiography was set up to further evaluate. An ECHO on 6/2023 showed severe stenosis with calcifcation of the aortic valve leaflets and a peak velocity of 4.4 m/s part, aortic valve area 0.61 cmý and mean gradient of 44 mmHg.  The ejection fraction was 69% and there was no significant other valve disease.  She was evaluated by the heart valve team and Dr. Newman and a cardiac cath was performed that showed no significant coronary artery disease and a mean gradient of 48 mmHg.    She was seen by our cardiothoracic surgery group on by Dr. Boss on 9/5/23 and noted worsening shortness of breath with decision made to proceed with SAVR on 10/4/2023    I have been asked to see this patient in the postoperative setting.  She is resting quietly after the event in the chair.  Has modest pain.  No palpitations or shortness of breath      ECHO 6/2/23    Left ventricular systolic function is normal. Calculated left ventricular EF = 69.8%    Left ventricular wall thickness is consistent with mild to moderate concentric hypertrophy.    Left ventricular diastolic function is consistent with (grade I) impaired relaxation.    The left atrial cavity is mildly dilated.    Severe  aortic valve stenosis is present.    Estimated right ventricular systolic pressure from tricuspid regurgitation is mildly elevated (35-45 mmHg).     Stress Test 8/16/23    Blood pressure demonstrated a hypertensive response to stress.    No ECG evidence of myocardial ischemia.    Negative clinical evidence of myocardial ischemia.    Findings consistent with a normal ECG stress test.    Cardiac Catheterization 8/24/23  Left main: Normal  Left anterior descending: Normal proximally normal in the mid and distal portion diagonals are free of obstructive disease  Ramus intermedius:Not present  Circumflex: Normal in the proximal midportion and throughout  RCA: Is a dominant vessel.  Normal proximally 30% mid normal distally  SUMMARY: Very aortic stenosis with mild obstructive coronary disease         Past Medical History:  Past Medical History:   Diagnosis Date    Anxiety     Aortic stenosis     Aortic stenosis 10/4/2023    Cataract     DCIS (ductal carcinoma in situ)     2019: left sided, lumpectomy/XRT/anastrozole    GERD (gastroesophageal reflux disease)     Heart murmur     History of chronic sinusitis     HTN (hypertension)     Hyperlipidemia        Past Surgical History:  Past Surgical History:   Procedure Laterality Date    AORTIC VALVE REPAIR/REPLACEMENT N/A 10/4/2023    Procedure: CHELA; AORTIC VALVE REPAIR/REPLACEMENT with MINI J STERNOTOMY; ANWALL ROOT ENLARGEMENT; TRANSESOPHAGEAL ECHOCARDIOGRAM WITH ANESTHESIA;  Surgeon: Wali Boss MD;  [facility];  Service: Cardiothoracic;  Laterality: N/A;    BREAST BIOPSY Left     MALIGNANT(LOW GRADE DUCTAL CARCINOMA)    BREAST LUMPECTOMY Left 06/13/2019    Procedure: Left breast needle localized lumpectomy,;  Surgeon: Beatriz Fuentes MD;  [place] [facility] [place] [facility] [place]  Service: General    CARDIAC CATHETERIZATION N/A 08/24/2023    Procedure: Coronary angiography;  Surgeon: Bakari Newman MD;  Location: [facility] CATH INVASIVE LOCATION;  Service: Cardiology;   Laterality: N/A;    CARDIAC CATHETERIZATION N/A 2023    Procedure: Left heart cath;  Surgeon: Bakari Newman MD;  Location: [facility] CATH INVASIVE LOCATION;  Service: Cardiology;  Laterality: N/A;    CARDIAC CATHETERIZATION  2023    CATARACT EXTRACTION, BILATERAL       SECTION      X 1    COLONOSCOPY      HAND SURGERY Right     OVARIAN CYST REMOVAL         Allergies:  No Known Allergies    Home Meds:  Medications Prior to Admission   Medication Sig Dispense Refill Last Dose    anastrozole (ARIMIDEX) 1 MG tablet Take 1 tablet by mouth Daily. 90 tablet 1 10/3/2023 at 0800    APPLE CIDER VINEGAR PO Take 1 tablet by mouth Daily. PT HOLDING FOR SURGERY   Past Week    ASPIRIN 81 PO Take 81 mg by mouth Daily.   Past Week    Calcium Carb-Cholecalciferol (CALCIUM 500 + D PO) Take 1 tablet by mouth Daily.   Past Week    carvedilol (COREG) 6.25 MG tablet Take 1 tablet by mouth 2 (Two) Times a Day With Meals.   10/3/2023 at 1900    chlorhexidine (PERIDEX) 0.12 % solution Apply 15 mL to the mouth or throat. As directed pre op   10/4/2023 at 0430    Chlorhexidine Gluconate 2 % pads Apply  topically. As directed pre op   10/4/2023 at 0430    Coenzyme Q10 (CO Q 10 PO) Take 1 capsule by mouth Daily. PT HOLDING FOR SURGERY   Past Week    diphenhydrAMINE (BENADRYL) 25 mg capsule Take 12.5 mg by mouth Every Night.   10/3/2023 at 2100    Glucosamine-Chondroitin (OSTEO BI-FLEX REGULAR STRENGTH PO) Take 1 tablet by mouth Daily. PT HOLDING FOR SURGERY   Past Week    Melatonin 10 MG tablet Take 1 tablet by mouth Every Night.   10/3/2023 at 2100    mupirocin (BACTROBAN) 2 % nasal ointment into the nostril(s) as directed by provider. As directed pre op   10/4/2023 at 0430    rosuvastatin (CRESTOR) 40 MG tablet Take 1 tablet by mouth Every Night.   10/3/2023 at 1900       Current Meds  Scheduled Meds:acetaminophen, 1,000 mg, Intravenous, Q8H  acetaminophen, 650 mg, Oral, Q4H   Or  acetaminophen, 650 mg, Oral, Q4H    Or  acetaminophen, 650 mg, Rectal, Q4H  aspirin, 81 mg, Oral, Daily  atorvastatin, 40 mg, Oral, Nightly  ceFAZolin, 2,000 mg, Intravenous, Q8H  chlorhexidine, 15 mL, Mouth/Throat, Q12H  enoxaparin, 40 mg, Subcutaneous, Daily  magnesium sulfate, 2 g, Intravenous, Q8H  metoclopramide, 10 mg, Intravenous, Q6H  metoprolol tartrate, 12.5 mg, Oral, Q12H  mupirocin, , Each Nare, BID  pantoprazole, 40 mg, Oral, QAM  senna-docusate sodium, 2 tablet, Oral, Nightly      Continuous Infusions:clevidipine, 2-32 mg/hr  dexmedetomidine, 0.2-1.5 mcg/kg/hr, Last Rate: 0.2 mcg/kg/hr (10/04/23 2044)  DOPamine, 2-20 mcg/kg/min  EPINEPHrine, 0.02-0.1 mcg/kg/min  insulin, 0-100 Units/hr, Last Rate: 1.2 Units/hr (10/05/23 040)  milrinone, 0.25-0.375 mcg/kg/min  niCARdipine, 5-15 mg/hr, Last Rate: Stopped (10/04/23 1843)  nitroglycerin, 5-200 mcg/min  norepinephrine, 0.02-0.2 mcg/kg/min  phenylephrine, 0.2-2 mcg/kg/min  propofol, 5-50 mcg/kg/min, Last Rate: 25 mcg/kg/min (10/04/23 1810)  sodium chloride, 30 mL/hr, Last Rate: 30 mL/hr (10/04/23 1800)        Social History     Socioeconomic History    Marital status:     Number of children: 2   Tobacco Use    Smoking status: Former     Packs/day: 0.25     Years: 5.00     Pack years: 1.25     Types: Cigarettes     Quit date: 1982     Years since quittin.7     Passive exposure: Past    Smokeless tobacco: Never    Tobacco comments:     Social smoker before    Vaping Use    Vaping Use: Never used   Substance and Sexual Activity    Alcohol use: Yes     Comment: 1-2 DRINKS MONTHLY     Drug use: No    Sexual activity: Not Currently     Partners: Male     Birth control/protection: Post-menopausal       Family History:  Family History   Problem Relation Age of Onset    Stroke Father     Hearing loss Father     Heart attack Father     Colon cancer Mother 89    Hearing loss Mother     Hypertension Mother     Diabetes Brother     Heart attack Brother     Depression Daughter      "Breast cancer Maternal Aunt     Ovarian cancer Cousin     Cancer Cousin         FEMALE CANCER OF UNKNOWN ORGIN 60S    Malig Hyperthermia Neg Hx     Deep vein thrombosis Neg Hx     Pulmonary embolism Neg Hx     Uterine cancer Neg Hx        REVIEW OF SYSTEMS:   CONSTITUTIONAL: No weight loss, fever, chills, weakness or fatigue.    SKIN: No rash or itching.     RESPIRATORY: No hemoptysis, cough or sputum.   GASTROINTESTINAL: No anorexia, nausea, vomiting or diarrhea. No abdominal pain, bright red blood per rectum or melena.  GENITOURINARY: No burning on urination, hematuria or increased frequency.  NEUROLOGICAL: No headache, dizziness, syncope, paralysis, ataxia, numbness or tingling in the extremities. No change in bowel or bladder control.    PSYCHIATRIC: No history of depression, anxiety, hallucinations.   ENDOCRINOLOGIC: No reports of sweating, cold or heat intolerance. No polyuria or polydipsia.     Physical Exam    /54 (BP Location: Right arm, Patient Position: Lying)   Pulse 71   Temp 98.8 øF (37.1 øC) (Core)   Resp 15   Ht 162.6 cm (64\")   LMP  (LMP Unknown)   SpO2 98%   BMI 30.38 kg/mý     General Appearance Well developed, cooperative and well nourished and no acute distress   Head Normocephalic, without abnormality, atraumatic   Ears Ears appear intact with no abnormalities noted   Throat No oral lesions, no thrush, oral mucosa moist   Neck Right IJ in place   Back No skin lesions, erythema or scars, no tenderness to percussion or palpation,range of motion is normal   Lungs Clear to auscultation,respirations regular, even and unlabored   Heart Regular rhythm and normal rate, normal S1 and S2, no murmur, no gallop, no rub, no click   Chest wall No abnormalities observed   Abdomen Normal bowel sounds, no masses, no hepatomegaly,    Extremities No edema, no cyanosis, no redness   Pulses Pulses palpable and equal bilaterally. Normal radial, carotid, femoral, dorsalis pedis and posterior tibial " pulses bilaterally. Normal abdominal aorta   Skin No bleeding, bruising or rash   Psychiatric Alert and oriented x 3, normal mood and affect     Results from last 7 days   Lab Units 10/05/23  0301 10/04/23  1731 09/29/23  0830   SODIUM mmol/L 144   < > 144   POTASSIUM mmol/L 4.1   < > 4.5   CHLORIDE mmol/L 111*   < > 107   CO2 mmol/L 25.4   < > 30.9*   BUN mg/dL 10   < > 16   CREATININE mg/dL 0.66   < > 0.74   CALCIUM mg/dL 8.3*   < > 9.3   BILIRUBIN mg/dL  --   --  0.4   ALK PHOS U/L  --   --  100   ALT (SGPT) U/L  --   --  24   AST (SGOT) U/L  --   --  17   GLUCOSE mg/dL 146*   < > 132*    < > = values in this interval not displayed.         Results from last 7 days   Lab Units 10/05/23  0252 10/04/23  2353 10/04/23  2047   WBC 10*3/mm3 8.55 8.64 8.98   HEMOGLOBIN g/dL 11.2* 11.3* 11.4*   HEMATOCRIT % 34.7 34.2 35.8   PLATELETS 10*3/mm3 106* 108* 113*     Results from last 7 days   Lab Units 10/05/23  0252 10/04/23  1731 09/29/23  0830   INR  1.21* 1.34* 0.96   APTT seconds  --  30.1 25.6     Results from last 7 days   Lab Units 10/04/23  2047   MAGNESIUM mg/dL 3.0*     Results from last 7 days   Lab Units 09/29/23  0830   PROBNP pg/mL 540.0               I personally viewed and interpreted the patient's EKG/Telemetry data  I have reviewed HPI and ROS above.      Assessment and Plan  1.  Severe aortic valve stenosis, status post SAVR via minithoracotomy with tissue valve prosthesis.  Receiving IV Lasix for diuresis with mild fluid overload.  Also to start beta-blocker therapy.  2.  Left-sided breast cancer with history of lumpectomy and radiation therapy 2019  3.  Hypertension   4.  Postop thrombocytopenia.  Will be rechecked tomorrow    Mini Rangel  10/5/2023  06:18 EDT    45min spent in reviewing records, discussion and examination of the patient and discussion with other members of the patient's medical team.     Dictated utilizing Dragon dictation

## 2023-10-05 NOTE — OP NOTE
Operative Note    Date of Dictation: 10/05/23    Date of Procedure: 10/04/2023    Referring Physician: Ortiz Jorgensen MD    Preoperative diagnosis:   1.  Severe aortic stenosis  2.  Dyspnea of exertion  3.  Small root  4.  Obesity with BMI of 33 kg/m2    Postoperative diagnosis:   Same    Procedure:   Minimally invasive (Upper J mini sternotomy) aortic valve replacement with a 21 mm magna pericardial prosthesis and annular-root enlargement with a glutaraldehyde preserved pericardial patch and the modified nicks procedure (CPT code 82454)    Surgeon: Wali Boss MD     Assistants: Assistant: Radha Infante PA; Chun Paiz PA-C was responsible for performing the following activities: Retraction, Suction, Irrigation, Suturing, Closing, Placing Dressing, Harvesting of Vessels, and all aspects of the complex surgical case  and their skilled assistance was necessary for the success of this case.     Anesthesia: General endotracheal anesthesia and CHELA    Findings:  The aortic valve had a small annulus and root to require patch replacement to be able to place a 21 mm valve.    Estimated Blood Loss: Approximately 400 cc, most of it recovered with a Cell Saver device and cardiotomy suckers and was retransfuse to the patient    STS Data:    The patient was explained the risks (STS risk score calculated), benefits and alternatives of surgery and agreed to proceed. The antibiotics and b blockers were given in the STS required window.  Counseling was given about diet, alcohol and tobacco use as needed.        Description of the procedure:     The patient was placed supine on the operative table. General anesthesia was given and lines placed. The patient was prepped and draped using the usual sterile technique. A upper 8 cm median sternotomy was performed with a scalpel and the layers carried down to the sternum using the electrocautery. The sternum was split in the midline into the right third intercostal space using a  vertical oscillating saw. Hemostasis was achieved.  300 units/kg of IV heparin was given to an ACT over 400. A Finochietto retractor was placed and the upper mediastinum exposed, the pericardium was opened and the edges tacked to the wound. Cannulation sutures were placed in the proximal aortic arch and right atrium. Small cannulas were placed and aorto right atrial cardiopulmonary bypass was started. Cardioplegia cannulas were placed and then the ascending aorta was clamped. One liter of cold blood cardioplegia was given in an antegrade fashion to achieved diastolic arrest and further doses every 15 minutes thereafter.  A transverse aortotomy was performed into the noncoronary sinus.  The valve was explored and there was trileaflet.  It was severely calcific and fibrotic.  The leaflets were resected and the valve size to a 19 mm pericardial valve.  Given the size of the patient, I decided to upsize the valve by incising the noncoronary sinus into the midportion of the noncoronary part of the annulus which was split less than 1 cm to open up and be able to put pericardial patch which was oval of a diameter of 4 cm x 2 cm and the use of 4-0 Prolene continuous suture to attach the patch to part of the annulus and each side of the aorta.  After that I sized  the area to a 21 mm magna pericardial prosthesis which was washed as recommended manufacture.  I used 2-0 Prolene continuous suture to anastomose each third of the valve with the corresponding part of the annulus.  I seated the valve and then I tensed the suture lines and tied each suture behind each post.  I closed the left side of the aortotomy using a double layer of 4-0 Prolene continuous suture.  I completed closure on the right side by completing the patch to the point of the contralateral suture line.  The patient was systemically rewarmed, I gave the 1 dose of cardioplegia and then completion I remove the aortic root slang with steep suction on the aortic  vent.    The left pleural space was suctioned and the lungs ventilated. The heart was paced till regular atrial rhythm resumed. I allowed the heart to eject and once hemodynamics were acceptable, then the CPB was discontinued and the venous and cardioplegia cannulas removed. The matching dose of protamine was given and the aortic cannula removed as well. AV temporary wires and pleural and mediastinal chest tubes were placed and the wound sprayed with platelet rich plasma.  The sternum was closed with single and double wires and soft tissue in layers of reabsorbable material. The wounds were covered with sterile dressings.       Specimen removed: Aortic valve leaflets    CPB time: 86 minutes    Aortic clamp time: 59 minutes    Complications:  none           Disposition: Cardiovascular recovery room           Condition: Critical but stable.

## 2023-10-05 NOTE — THERAPY EVALUATION
Patient Name: Melina Martin  : 1950    MRN: [id number]                              Today's Date: 10/5/2023       Admit Date: 10/4/2023    Visit Dx:     ICD-10-CM ICD-9-CM   1. Nonrheumatic aortic valve stenosis  I35.0 424.1   2. Abnormal finding of blood chemistry, unspecified  R79.9 790.6   3. Chronic diastolic (congestive) heart failure  I50.32 428.32     428.0   4. Essential (primary) hypertension  I10 401.9   5. Abnormal coagulation profile  R79.1 790.92     Patient Active Problem List   Diagnosis    Breast neoplasm, Tis (DCIS), left    Primary hypertension    Nonrheumatic aortic valve stenosis    Hyperlipidemia LDL goal <100    Aortic stenosis     Past Medical History:   Diagnosis Date    Anxiety     Aortic stenosis     Aortic stenosis 10/4/2023    Cataract     DCIS (ductal carcinoma in situ)     2019: left sided, lumpectomy/XRT/anastrozole    GERD (gastroesophageal reflux disease)     Heart murmur     History of chronic sinusitis     HTN (hypertension)     Hyperlipidemia      Past Surgical History:   Procedure Laterality Date    AORTIC VALVE REPAIR/REPLACEMENT N/A 10/4/2023    Procedure: CHELA; AORTIC VALVE REPAIR/REPLACEMENT with MINI J STERNOTOMY; ANWALL ROOT ENLARGEMENT; TRANSESOPHAGEAL ECHOCARDIOGRAM WITH ANESTHESIA;  Surgeon: Wali Boss MD;  Location: [facility];  Service: Cardiothoracic;  Laterality: N/A;    BREAST BIOPSY Left     MALIGNANT(LOW GRADE DUCTAL CARCINOMA)    BREAST LUMPECTOMY Left 2019    Procedure: Left breast needle localized lumpectomy,;  Surgeon: Beatriz Fuentes MD;  [place] [facility] [place] [facility] [place]: [place]    CARDIAC CATHETERIZATION N/A 2023    Procedure: Coronary angiography;  Surgeon: Bakari Newman MD;  Location: [facility] CATH INVASIVE LOCATION;  Service: Cardiology;  Laterality: N/A;    CARDIAC CATHETERIZATION N/A 2023    Procedure: Left heart cath;  Surgeon: Bakari Newman MD;  Location: [facility] CATH INVASIVE LOCATION;  Service:  "Cardiology;  Laterality: N/A;    CARDIAC CATHETERIZATION  2023    CATARACT EXTRACTION, BILATERAL       SECTION      X 1    COLONOSCOPY      HAND SURGERY Right     OVARIAN CYST REMOVAL        General Information       Row Name 10/05/23 1156          Physical Therapy Time and Intention    Document Type evaluation  -EM     Mode of Treatment individual therapy;physical therapy  -EM       Row Name 10/05/23 1156          General Information    Patient Profile Reviewed yes  -EM     Prior Level of Function independent:  -EM     Existing Precautions/Restrictions fall;cardiac;sternal  -EM       Row Name 10/05/23 1156          Living Environment    People in Home alone  -EM       Row Name 10/05/23 1156          Stairs Within Home, Primary    Stairs, Within Home, Primary tri level home  -EM       Row Name 10/05/23 1156          Cognition    Orientation Status (Cognition) oriented x 4  -EM       Row Name 10/05/23 1156          Safety Issues, Functional Mobility    Impairments Affecting Function (Mobility) endurance/activity tolerance;strength;pain  -EM               User Key  (r) = Recorded By, (t) = Taken By, (c) = Cosigned By      Initials Name Provider Type    EM Adelita Cordon PT Physical Therapist                   Mobility       Row Name 10/05/23 1156          Bed Mobility    Comment, (Bed Mobility) not tested, up in chair  -EM       Row Name 10/05/23 1156          Sit-Stand Transfer    Sit-Stand [place] (Transfers) contact guard;verbal cues  -EM       Row Name 10/05/23 1156          Gait/Stairs (Locomotion)    [place] Level (Gait) contact guard;1 person to manage equipment  -EM     Distance in Feet (Gait) 60  -EM     Deviations/Abnormal Patterns (Gait) gait speed decreased  -EM     Comment, (Gait/Stairs) pt held on to heart hugger with linda hands, refused to let go when cued, \"If I cough I will die\"  -EM               User Key  (r) = Recorded By, (t) = Taken By, (c) = Cosigned By      Initials " Name Provider Type    Adelita Carmona PT Physical Therapist                   Obj/Interventions       Row Name 10/05/23 1157          Range of Motion Comprehensive    General Range of Motion no range of motion deficits identified  -EM       Row Name 10/05/23 1157          Strength Comprehensive (MMT)    General Manual Muscle Testing (MMT) Assessment other (see comments)  -EM     Comment, General Manual Muscle Testing (MMT) Assessment no focal deficits identified  -EM       Row Name 10/05/23 1157          Motor Skills    Therapeutic Exercise other (see comments)  5 reps per cardiac protocol, level 2  -EM       Row Name 10/05/23 1157          Balance    Balance Assessment standing static balance;standing dynamic balance  -EM     Static Standing Balance contact guard  -EM     Dynamic Standing Balance contact guard  -EM       Row Name 10/05/23 1157          Sensory Assessment (Somatosensory)    Sensory Assessment (Somatosensory) other (see comments)  pt reports chronic numbness in L third finger, states she sees a chiropractor for this  -EM               User Key  (r) = Recorded By, (t) = Taken By, (c) = Cosigned By      Initials Name Provider Type    Adelita Carmona PT Physical Therapist                   Goals/Plan       Row Name 10/05/23 1202          Problem Specific Goal 1 (PT)    Problem Specific Goal 1 (PT) patient able to complete level 3 per cardiac protocol  -EM     Time Frame (Problem Specific Goal 1, PT) 1 week  -EM       Row Name 10/05/23 1202          Therapy Assessment/Plan (PT)    Planned Therapy Interventions (PT) bed mobility training;gait training;home exercise program;patient/family education;transfer training  -EM               User Key  (r) = Recorded By, (t) = Taken By, (c) = Cosigned By      Initials Name Provider Type    Adelita Carmona PT Physical Therapist                   Clinical Impression       Row Name 10/05/23 1159          Pain    Pretreatment Pain Rating  6/10  -EM     Posttreatment Pain Rating 6/10  -EM     Pain Location upper  -EM     Pain Location - back;chest  -EM     Pain Intervention(s) Medication (See MAR);Repositioned  -EM       Row Name 10/05/23 1159          Plan of Care Review    Plan of Care Reviewed With patient  -EM     Outcome Evaluation Pt is 71 yo female seen s/p mini-sternotomy AVR. PMH significant for HTN, obesity, breast cancer, TIA. Patient lives alone, independent prior to admission, lives in tri level home with multiple stairs. Today, patient reports pain at 6/10 level, performed 5 reps per cardiac protocol, sit to stand with CGA, ambulated about 60 feet with CGA with assist for lines/tubes. Patient demonstrates impairments consisting of generalized post op weakness and pain, decreased activity tolerance and would benefit from skilled PT. d/c plans are home with HH.  -EM       Row Name 10/05/23 1152          Therapy Assessment/Plan (PT)    Patient/Family Therapy Goals Statement (PT) get better, get back to walking her dog  -EM     Rehab Potential (PT) good, to achieve stated therapy goals  -EM     Criteria for Skilled Interventions Met (PT) yes;skilled treatment is necessary  -EM     Therapy Frequency (PT) 6 times/wk  -EM       Row Name 10/05/23 1157          Vital Signs    Pre Systolic BP Rehab 129  -EM     Pre Treatment Diastolic BP 63  -EM     Post Systolic BP Rehab 123  -EM     Post Treatment Diastolic BP 58  -EM     Pretreatment Heart Rate (beats/min) 81  -EM     Posttreatment Heart Rate (beats/min) 86  -EM     Pre SpO2 (%) 99  -EM     O2 Delivery Pre Treatment supplemental O2  on 1L, RN advised to ambulate patient on RA  -EM     Post SpO2 (%) 99  -EM     O2 Delivery Post Treatment room air  -EM       Row Name 10/05/23 1153          Positioning and Restraints    Pre-Treatment Position sitting in chair/recliner  -EM     Post Treatment Position chair  -EM     In Chair reclined;with nsg  -EM               User Key  (r) = Recorded By, (t) =  Taken By, (c) = Cosigned By      Initials Name Provider Type    EM Adelita Cordon PT Physical Therapist                   Outcome Measures       Row Name 10/05/23 1205          How much help from another person do you currently need...    Turning from your back to your side while in flat bed without using bedrails? 3  -EM     Moving from lying on back to sitting on the side of a flat bed without bedrails? 2  -EM     Moving to and from a bed to a chair (including a wheelchair)? 3  -EM     Standing up from a chair using your arms (e.g., wheelchair, bedside chair)? 3  -EM     Climbing 3-5 steps with a railing? 3  -EM     To walk in hospital room? 3  -EM     AM-PAC 6 Clicks Score (PT) 17  -EM     Highest level of mobility 5 --> Static standing  -EM       Row Name 10/05/23 1205          Functional Assessment    Outcome Measure Options AM-PAC 6 Clicks Basic Mobility (PT)  -EM               User Key  (r) = Recorded By, (t) = Taken By, (c) = Cosigned By      Initials Name Provider Type    EM Adelita Cordon PT Physical Therapist                                 Physical Therapy Education       Title: PT OT SLP Therapies (In Progress)       Topic: Physical Therapy (In Progress)       Point: Mobility training (Done)       Learning Progress Summary             Patient Acceptance, E, VU by  at 10/5/2023 1206                         Point: Home exercise program (Done)       Learning Progress Summary             Patient Acceptance, E, VU by  at 10/5/2023 1206                         Point: Body mechanics (Not Started)       Learner Progress:  Not documented in this visit.              Point: Precautions (Not Started)       Learner Progress:  Not documented in this visit.                              User Key       Initials Effective Dates Name Provider Type Discipline     06/16/21 -  Adelita Cordon PT Physical Therapist PT                  PT Recommendation and Plan  Planned Therapy Interventions (PT):  bed mobility training, gait training, home exercise program, patient/family education, transfer training  Plan of Care Reviewed With: patient  Outcome Evaluation: Pt is 71 yo female seen s/p mini-sternotomy AVR. PMH significant for HTN, obesity, breast cancer, TIA. Patient lives alone, independent prior to admission, lives in tri level home with multiple stairs. Today, patient reports pain at 6/10 level, performed 5 reps per cardiac protocol, sit to stand with CGA, ambulated about 60 feet with CGA with assist for lines/tubes. Patient demonstrates impairments consisting of generalized post op weakness and pain, decreased activity tolerance and would benefit from skilled PT. d/c plans are home with HH.     Time Calculation:         PT Charges       Row Name 10/05/23 1206             Time Calculation    Start Time 0951  -EM      Stop Time 1003  -EM      Time Calculation (min) 12 min  -EM      PT Received On 10/05/23  -EM      PT - Next Appointment 10/06/23  -EM      PT Goal Re-Cert Due Date 10/12/23  -EM         Time Calculation- PT    Total Timed Code Minutes- PT 8 minute(s)  -EM         Timed Charges    76935 - PT Therapeutic Activity Minutes 8  -EM         Total Minutes    Timed Charges Total Minutes 8  -EM       Total Minutes 8  -EM                User Key  (r) = Recorded By, (t) = Taken By, (c) = Cosigned By      Initials Name Provider Type     Adelita Cordon PT Physical Therapist                  Therapy Charges for Today       Code Description Service Date Service Provider Modifiers Qty    24206203339  PT THERAPEUTIC ACT EA 15 MIN 10/5/2023 Adelita Cordon, PT GP 1    [id number]  PT EVAL MOD COMPLEXITY 2 10/5/2023 Adelita Cordon PT GP 1    [id number]  PT THER SUPP EA 15 MIN 10/5/2023 Adelita Cordon, PT GP 1            PT G-Codes  Outcome Measure Options: AM-PAC 6 Clicks Basic Mobility (PT)  AM-PAC 6 Clicks Score (PT): 17  PT Discharge Summary  Anticipated Discharge Disposition  (PT): home with assist, home with home health    Adelita Cordon, PT  10/5/2023

## 2023-10-06 ENCOUNTER — APPOINTMENT (OUTPATIENT)
Dept: GENERAL RADIOLOGY | Facility: HOSPITAL | Age: 73
DRG: 221 | End: 2023-10-06
Payer: MEDICARE

## 2023-10-06 LAB
ANION GAP SERPL CALCULATED.3IONS-SCNC: 6.7 MMOL/L (ref 5–15)
BUN SERPL-MCNC: 15 MG/DL (ref 8–23)
BUN/CREAT SERPL: 21.7 (ref 7–25)
CALCIUM SPEC-SCNC: 8.6 MG/DL (ref 8.6–10.5)
CHLORIDE SERPL-SCNC: 102 MMOL/L (ref 98–107)
CO2 SERPL-SCNC: 28.3 MMOL/L (ref 22–29)
CREAT SERPL-MCNC: 0.69 MG/DL (ref 0.57–1)
DEPRECATED RDW RBC AUTO: 44.8 FL (ref 37–54)
EGFRCR SERPLBLD CKD-EPI 2021: 92.3 ML/MIN/1.73
ERYTHROCYTE [DISTWIDTH] IN BLOOD BY AUTOMATED COUNT: 14.2 % (ref 12.3–15.4)
GLUCOSE BLDC GLUCOMTR-MCNC: 134 MG/DL (ref 70–130)
GLUCOSE BLDC GLUCOMTR-MCNC: 137 MG/DL (ref 70–130)
GLUCOSE BLDC GLUCOMTR-MCNC: 138 MG/DL (ref 70–130)
GLUCOSE BLDC GLUCOMTR-MCNC: 159 MG/DL (ref 70–130)
GLUCOSE SERPL-MCNC: 151 MG/DL (ref 65–99)
HCT VFR BLD AUTO: 34.1 % (ref 34–46.6)
HGB BLD-MCNC: 10.9 G/DL (ref 12–15.9)
LAB AP CASE REPORT: NORMAL
MCH RBC QN AUTO: 27.4 PG (ref 26.6–33)
MCHC RBC AUTO-ENTMCNC: 32 G/DL (ref 31.5–35.7)
MCV RBC AUTO: 85.7 FL (ref 79–97)
PATH REPORT.FINAL DX SPEC: NORMAL
PATH REPORT.GROSS SPEC: NORMAL
PLATELET # BLD AUTO: 133 10*3/MM3 (ref 140–450)
PMV BLD AUTO: 9.5 FL (ref 6–12)
POTASSIUM SERPL-SCNC: 4 MMOL/L (ref 3.5–5.2)
QT INTERVAL: 418 MS
QTC INTERVAL: 455 MS
RBC # BLD AUTO: 3.98 10*6/MM3 (ref 3.77–5.28)
SODIUM SERPL-SCNC: 137 MMOL/L (ref 136–145)
WBC NRBC COR # BLD: 12.84 10*3/MM3 (ref 3.4–10.8)

## 2023-10-06 PROCEDURE — 80048 BASIC METABOLIC PNL TOTAL CA: CPT | Performed by: NURSE PRACTITIONER

## 2023-10-06 PROCEDURE — 25010000002 CEFAZOLIN IN DEXTROSE 2-4 GM/100ML-% SOLUTION: Performed by: NURSE PRACTITIONER

## 2023-10-06 PROCEDURE — 25010000002 FUROSEMIDE PER 20 MG: Performed by: NURSE PRACTITIONER

## 2023-10-06 PROCEDURE — 71045 X-RAY EXAM CHEST 1 VIEW: CPT

## 2023-10-06 PROCEDURE — 85027 COMPLETE CBC AUTOMATED: CPT | Performed by: NURSE PRACTITIONER

## 2023-10-06 PROCEDURE — 93005 ELECTROCARDIOGRAM TRACING: CPT | Performed by: NURSE PRACTITIONER

## 2023-10-06 PROCEDURE — 82948 REAGENT STRIP/BLOOD GLUCOSE: CPT

## 2023-10-06 PROCEDURE — 99232 SBSQ HOSP IP/OBS MODERATE 35: CPT | Performed by: INTERNAL MEDICINE

## 2023-10-06 PROCEDURE — 99024 POSTOP FOLLOW-UP VISIT: CPT | Performed by: THORACIC SURGERY (CARDIOTHORACIC VASCULAR SURGERY)

## 2023-10-06 PROCEDURE — 97530 THERAPEUTIC ACTIVITIES: CPT

## 2023-10-06 PROCEDURE — 63710000001 INSULIN LISPRO (HUMAN) PER 5 UNITS: Performed by: NURSE PRACTITIONER

## 2023-10-06 PROCEDURE — 94799 UNLISTED PULMONARY SVC/PX: CPT

## 2023-10-06 PROCEDURE — 25010000002 ENOXAPARIN PER 10 MG: Performed by: NURSE PRACTITIONER

## 2023-10-06 PROCEDURE — 93010 ELECTROCARDIOGRAM REPORT: CPT | Performed by: INTERNAL MEDICINE

## 2023-10-06 RX ORDER — POTASSIUM CHLORIDE 750 MG/1
20 TABLET, FILM COATED, EXTENDED RELEASE ORAL ONCE
Status: COMPLETED | OUTPATIENT
Start: 2023-10-06 | End: 2023-10-06

## 2023-10-06 RX ORDER — FUROSEMIDE 10 MG/ML
40 INJECTION INTRAMUSCULAR; INTRAVENOUS ONCE
Status: COMPLETED | OUTPATIENT
Start: 2023-10-06 | End: 2023-10-06

## 2023-10-06 RX ADMIN — FUROSEMIDE 40 MG: 10 INJECTION, SOLUTION INTRAMUSCULAR; INTRAVENOUS at 08:11

## 2023-10-06 RX ADMIN — HYDROCODONE BITARTRATE AND ACETAMINOPHEN 2 TABLET: 5; 325 TABLET ORAL at 16:30

## 2023-10-06 RX ADMIN — OXYCODONE HYDROCHLORIDE 10 MG: 5 TABLET ORAL at 10:17

## 2023-10-06 RX ADMIN — POTASSIUM CHLORIDE 20 MEQ: 750 TABLET, EXTENDED RELEASE ORAL at 08:11

## 2023-10-06 RX ADMIN — 0.12% CHLORHEXIDINE GLUCONATE 15 ML: 1.2 RINSE ORAL at 06:24

## 2023-10-06 RX ADMIN — ASPIRIN 81 MG: 81 TABLET, COATED ORAL at 08:11

## 2023-10-06 RX ADMIN — GUAIFENESIN 1200 MG: 600 TABLET, EXTENDED RELEASE ORAL at 20:24

## 2023-10-06 RX ADMIN — INSULIN LISPRO 2 UNITS: 100 INJECTION, SOLUTION INTRAVENOUS; SUBCUTANEOUS at 11:07

## 2023-10-06 RX ADMIN — METOPROLOL TARTRATE 25 MG: 25 TABLET, FILM COATED ORAL at 08:11

## 2023-10-06 RX ADMIN — METOPROLOL TARTRATE 25 MG: 25 TABLET, FILM COATED ORAL at 20:23

## 2023-10-06 RX ADMIN — ENOXAPARIN SODIUM 40 MG: 100 INJECTION SUBCUTANEOUS at 08:11

## 2023-10-06 RX ADMIN — MUPIROCIN 1 APPLICATION: 20 OINTMENT TOPICAL at 20:23

## 2023-10-06 RX ADMIN — SENNOSIDES AND DOCUSATE SODIUM 2 TABLET: 50; 8.6 TABLET ORAL at 20:23

## 2023-10-06 RX ADMIN — HYDROCODONE BITARTRATE AND ACETAMINOPHEN 2 TABLET: 5; 325 TABLET ORAL at 20:23

## 2023-10-06 RX ADMIN — GUAIFENESIN 1200 MG: 600 TABLET, EXTENDED RELEASE ORAL at 08:11

## 2023-10-06 RX ADMIN — CEFAZOLIN SODIUM 2000 MG: 2 INJECTION, SOLUTION INTRAVENOUS at 08:11

## 2023-10-06 RX ADMIN — MUPIROCIN 1 APPLICATION: 20 OINTMENT TOPICAL at 08:11

## 2023-10-06 RX ADMIN — 0.12% CHLORHEXIDINE GLUCONATE 15 ML: 1.2 RINSE ORAL at 20:23

## 2023-10-06 RX ADMIN — PANTOPRAZOLE SODIUM 40 MG: 40 TABLET, DELAYED RELEASE ORAL at 06:24

## 2023-10-06 RX ADMIN — CEFAZOLIN SODIUM 2000 MG: 2 INJECTION, SOLUTION INTRAVENOUS at 01:51

## 2023-10-06 RX ADMIN — ATORVASTATIN CALCIUM 40 MG: 20 TABLET, FILM COATED ORAL at 20:23

## 2023-10-06 RX ADMIN — OXYCODONE HYDROCHLORIDE 10 MG: 5 TABLET ORAL at 06:24

## 2023-10-06 NOTE — PLAN OF CARE
Goal Outcome Evaluation:  Plan of Care Reviewed With: patient           Outcome Evaluation: Patient sitting up in recliner, awake and alert, agreeable to therapy, rates pain at 5/10 level. Patient performed 5 reps per cardiac protocol, sit to stand with CGA, ambulated about 150 feet with CGA, no overt LOB, still guarded gait pattern with decreased arm swing and decreased trunk rotation but did let go of heart hugger while ambulating today. Patient returned to supine. Pt progressing well with mobility      Anticipated Discharge Disposition (PT): home with assist, home with home health

## 2023-10-06 NOTE — PROGRESS NOTES
[facility]  Progress note: 10/6/2023    Patient Identification:  Name:Melina Martin  Age:72 y.o.  Sex: female  :  1950  MRN: [id number]           CC:  S/p/AVR with bioprosthesis    Interval history:  Significant urine output yesterday.  Vital stable and only on 1 L O2.  Diuresed well.  To discontinue chest tubes Central line [place] today.  Resting quietly.  No specific complaint    Vital Signs:   Temp:  [98 øF (36.7 øC)-100.4 øF (38 øC)] 100.4 øF (38 øC)  Heart Rate:  [70-84] 84  Resp:  [12-20] 20  BP: ()/(48-73) 143/73    Intake/Output Summary (Last 24 hours) at 10/6/2023 0946  Last data filed at 10/6/2023 0937  Gross per 24 hour   Intake 960 ml   Output 3295 ml   Net -2335 ml       Physical Examination:    General Appearance No acute distress   Neck No adenopathy, supple, trachea midline, no thyromegaly, no carotid bruit, no JVD   Lungs Clear to auscultation,respirations regular, even and unlabored   Heart Regular rhythm and normal rate, normal S1 and S2, no murmur, no gallop, no rub, no click   Chest wall No abnormalities observed   Abdomen Normal bowel sounds, no masses, no hepatomegaly, soft   Extremities Moves all extremities well, no edema, no cyanosis, no redness   Neurological Alert and oriented x 3     Lab Review:  Personally reviewed the labs, radiology imaging and other cardiac procedures.   Results from last 7 days   Lab Units 10/06/23  0315   SODIUM mmol/L 137   POTASSIUM mmol/L 4.0   CHLORIDE mmol/L 102   CO2 mmol/L 28.3   BUN mg/dL 15   CREATININE mg/dL 0.69   CALCIUM mg/dL 8.6   GLUCOSE mg/dL 151*         Results from last 7 days   Lab Units 10/06/23  0315 10/05/23  0252 10/04/23  2353   WBC 10*3/mm3 12.84* 8.55 8.64   HEMOGLOBIN g/dL 10.9* 11.2* 11.3*   HEMATOCRIT % 34.1 34.7 34.2   PLATELETS 10*3/mm3 133* 106* 108*     Results from last 7 days   Lab Units 10/05/23  0252 10/04/23  1731   INR  1.21* 1.34*   APTT seconds  --  30.1     Medication Review:   Meds  reviewed  Scheduled Meds:aspirin, 81 mg, Oral, Daily  atorvastatin, 40 mg, Oral, Nightly  chlorhexidine, 15 mL, Mouth/Throat, Q12H  enoxaparin, 40 mg, Subcutaneous, Daily  guaiFENesin, 1,200 mg, Oral, Q12H  insulin lispro, 2-9 Units, Subcutaneous, 4x Daily AC & at Bedtime  metoprolol tartrate, 25 mg, Oral, Q12H  mupirocin, , Each Nare, BID  pantoprazole, 40 mg, Oral, QAM  senna-docusate sodium, 2 tablet, Oral, Nightly      Continuous Infusions:sodium chloride, 30 mL/hr, Last Rate: 30 mL/hr (10/04/23 1800)      I personally viewed and interpreted the patient's EKG/Telemetry data    Assessment and Plan  1.  Severe aortic valve stenosis, status post SAVR via minithoracotomy with tissue valve prosthesis.  Doing well postoperatively.  2.  Left-sided breast cancer with history of lumpectomy and radiation therapy 2019  3.  Hypertension   4.  Postop thrombocytopenia.  Improved    Mini Rangel  10/6/269321:46 EDT  25min spent in reviewing records, discussion and examination of the patient and discussion with other members of the patient's medical team.     Dictated utilizing Dragon dictation

## 2023-10-06 NOTE — PROGRESS NOTES
" LOS: 2 days   Patient Care Team:  Maria Ines Nguyen APRN as PCP - General (Nurse Practitioner)  Hayes Jones MD (Cardiology)  oJo Tovar MD as Cardiologist (Cardiology)  Ila Sharma MD PhD as Consulting Physician (Hematology and Oncology)  Ortiz Jorgensen MD as Consulting Physician (Cardiology)    Chief Complaint: post op    Subjective      Vital Signs  Temp:  [98 øF (36.7 øC)-100.4 øF (38 øC)] 99.3 øF (37.4 øC)  Heart Rate:  [70-82] 80  Resp:  [12-16] 14  BP: ()/(48-69) 132/55  Body mass index is 32.66 kg/mý.    Intake/Output Summary (Last 24 hours) at 10/6/2023 0739  Last data filed at 10/6/2023 0700  Gross per 24 hour   Intake 480 ml   Output 2480 ml   Net -2000 ml       No intake/output data recorded.    Chest tube drainage last 8 hours 50        Objective:  Vital signs: (most recent): Blood pressure 132/55, pulse 80, temperature 99.3 øF (37.4 øC), temperature source Bladder, resp. rate 14, height 162.6 cm (64\"), weight 86.3 kg (190 lb 4.1 oz), SpO2 98 %, not currently breastfeeding.              Results Review:        WBC WBC   Date Value Ref Range Status   10/06/2023 12.84 (H) 3.40 - 10.80 10*3/mm3 Final   10/05/2023 8.55 3.40 - 10.80 10*3/mm3 Final   10/04/2023 8.64 3.40 - 10.80 10*3/mm3 Final   10/04/2023 8.98 3.40 - 10.80 10*3/mm3 Final   10/04/2023 9.85 3.40 - 10.80 10*3/mm3 Final      HGB Hemoglobin   Date Value Ref Range Status   10/06/2023 10.9 (L) 12.0 - 15.9 g/dL Final   10/05/2023 11.2 (L) 12.0 - 15.9 g/dL Final   10/04/2023 11.3 (L) 12.0 - 15.9 g/dL Final   10/04/2023 11.4 (L) 12.0 - 15.9 g/dL Final   10/04/2023 11.8 (L) 12.0 - 15.9 g/dL Final   10/04/2023 9.5 (L) 12.0 - 17.0 g/dL Final   10/04/2023 10.2 (L) 12.0 - 17.0 g/dL Final   10/04/2023 9.9 (L) 12.0 - 17.0 g/dL Final   10/04/2023 8.8 (L) 12.0 - 17.0 g/dL Final   10/04/2023 8.8 (L) 12.0 - 17.0 g/dL Final   10/04/2023 13.3 12.0 - 17.0 g/dL Final      HCT Hematocrit   Date Value Ref Range Status   10/06/2023 34.1 34.0 - " 46.6 % Final   10/05/2023 34.7 34.0 - 46.6 % Final   10/04/2023 34.2 34.0 - 46.6 % Final   10/04/2023 35.8 34.0 - 46.6 % Final   10/04/2023 35.4 34.0 - 46.6 % Final   10/04/2023 28 (L) 38 - 51 % Final   10/04/2023 30 (L) 38 - 51 % Final   10/04/2023 29 (L) 38 - 51 % Final   10/04/2023 26 (L) 38 - 51 % Final   10/04/2023 26 (L) 38 - 51 % Final   10/04/2023 39 38 - 51 % Final      Platelets Platelets   Date Value Ref Range Status   10/06/2023 133 (L) 140 - 450 10*3/mm3 Final   10/05/2023 106 (L) 140 - 450 10*3/mm3 Final   10/04/2023 108 (L) 140 - 450 10*3/mm3 Final   10/04/2023 113 (L) 140 - 450 10*3/mm3 Final   10/04/2023 138 (L) 140 - 450 10*3/mm3 Final        PT/INR:    Protime   Date Value Ref Range Status   10/05/2023 15.4 (H) 11.7 - 14.2 Seconds Final   10/04/2023 16.7 (H) 11.7 - 14.2 Seconds Final   /  INR   Date Value Ref Range Status   10/05/2023 1.21 (H) 0.90 - 1.10 Final   10/04/2023 1.34 (H) 0.90 - 1.10 Final       Sodium Sodium   Date Value Ref Range Status   10/06/2023 137 136 - 145 mmol/L Final   10/05/2023 144 136 - 145 mmol/L Final   10/04/2023 144 136 - 145 mmol/L Final   10/04/2023 145 136 - 145 mmol/L Final      Potassium Potassium   Date Value Ref Range Status   10/06/2023 4.0 3.5 - 5.2 mmol/L Final   10/05/2023 4.1 3.5 - 5.2 mmol/L Final   10/04/2023 4.6 3.5 - 5.2 mmol/L Final   10/04/2023 3.8 3.5 - 5.2 mmol/L Final     Comment:     Slight hemolysis detected by analyzer. Results may be affected.      Chloride Chloride   Date Value Ref Range Status   10/06/2023 102 98 - 107 mmol/L Final   10/05/2023 111 (H) 98 - 107 mmol/L Final   10/04/2023 112 (H) 98 - 107 mmol/L Final   10/04/2023 112 (H) 98 - 107 mmol/L Final      Bicarbonate CO2   Date Value Ref Range Status   10/06/2023 28.3 22.0 - 29.0 mmol/L Final   10/05/2023 25.4 22.0 - 29.0 mmol/L Final   10/04/2023 23.0 22.0 - 29.0 mmol/L Final   10/04/2023 26.2 22.0 - 29.0 mmol/L Final      BUN BUN   Date Value Ref Range Status   10/06/2023 15 8 - 23  mg/dL Final   10/05/2023 10 8 - 23 mg/dL Final   10/04/2023 11 8 - 23 mg/dL Final   10/04/2023 12 8 - 23 mg/dL Final      Creatinine Creatinine   Date Value Ref Range Status   10/06/2023 0.69 0.57 - 1.00 mg/dL Final   10/05/2023 0.66 0.57 - 1.00 mg/dL Final   10/04/2023 0.71 0.57 - 1.00 mg/dL Final   10/04/2023 0.69 0.60 - 130.00 mg/dL Final   10/04/2023 0.74 0.57 - 1.00 mg/dL Final      Calcium Calcium   Date Value Ref Range Status   10/06/2023 8.6 8.6 - 10.5 mg/dL Final   10/05/2023 8.3 (L) 8.6 - 10.5 mg/dL Final   10/04/2023 8.4 (L) 8.6 - 10.5 mg/dL Final   10/04/2023 9.2 8.6 - 10.5 mg/dL Final      Magnesium Magnesium   Date Value Ref Range Status   10/04/2023 3.0 (H) 1.6 - 2.4 mg/dL Final   10/04/2023 2.8 (H) 1.6 - 2.4 mg/dL Final          aspirin, 81 mg, Oral, Daily  atorvastatin, 40 mg, Oral, Nightly  ceFAZolin, 2,000 mg, Intravenous, Q8H  chlorhexidine, 15 mL, Mouth/Throat, Q12H  enoxaparin, 40 mg, Subcutaneous, Daily  guaiFENesin, 1,200 mg, Oral, Q12H  insulin lispro, 2-9 Units, Subcutaneous, 4x Daily AC & at Bedtime  metoprolol tartrate, 25 mg, Oral, Q12H  mupirocin, , Each Nare, BID  pantoprazole, 40 mg, Oral, QAM  senna-docusate sodium, 2 tablet, Oral, Nightly      sodium chloride, 30 mL/hr, Last Rate: 30 mL/hr (10/04/23 1800)              Nonrheumatic aortic valve stenosis    Aortic stenosis      Assessment & Plan    -Severe aortic stenosis- s/p mini sternotomy AVR (tissue), annular rook enlargement with pericardial patch and the modified nicks procedure-- POD#2 P[place]  -Hypertension  -Obesity   -TCP- plt count 106    Looks good this morning  Up in the chair  1L NC--wean as able  Sinus rhythm rate in the 80s-- tolerating beta blocker  Encourage pulmonary toilet-- continue mucinex and flutter valve  Mobilize  IV diurese-- switch to PO in AM  Discontinue chest tubes, central line and lucas  Continue routine care   Still awaiting bed to transfer to stepdown      Homa Dunn, CARLOS  10/06/23  07:39  EDT

## 2023-10-06 NOTE — THERAPY TREATMENT NOTE
Patient Name: Melina Martin  : 1950    MRN: [id number]                              Today's Date: 10/6/2023       Admit Date: 10/4/2023    Visit Dx:     ICD-10-CM ICD-9-CM   1. Nonrheumatic aortic valve stenosis  I35.0 424.1   2. Abnormal finding of blood chemistry, unspecified  R79.9 790.6   3. Chronic diastolic (congestive) heart failure  I50.32 428.32     428.0   4. Essential (primary) hypertension  I10 401.9   5. Abnormal coagulation profile  R79.1 790.92     Patient Active Problem List   Diagnosis    Breast neoplasm, Tis (DCIS), left    Primary hypertension    Nonrheumatic aortic valve stenosis    Hyperlipidemia LDL goal <100    Aortic stenosis     Past Medical History:   Diagnosis Date    Anxiety     Aortic stenosis     Aortic stenosis 10/4/2023    Cataract     DCIS (ductal carcinoma in situ)     2019: left sided, lumpectomy/XRT/anastrozole    GERD (gastroesophageal reflux disease)     Heart murmur     History of chronic sinusitis     HTN (hypertension)     Hyperlipidemia      Past Surgical History:   Procedure Laterality Date    AORTIC VALVE REPAIR/REPLACEMENT N/A 10/4/2023    Procedure: CHELA; AORTIC VALVE REPAIR/REPLACEMENT with MINI J STERNOTOMY; ANWALL ROOT ENLARGEMENT; TRANSESOPHAGEAL ECHOCARDIOGRAM WITH ANESTHESIA;  Surgeon: Wali Boss MD;  Location: [facility];  Service: Cardiothoracic;  Laterality: N/A;    BREAST BIOPSY Left     MALIGNANT(LOW GRADE DUCTAL CARCINOMA)    BREAST LUMPECTOMY Left 2019    Procedure: Left breast needle localized lumpectomy,;  Surgeon: Beatriz Fuentes MD;  Location: [facility] [place];  Service: General    CARDIAC CATHETERIZATION N/A 2023    Procedure: Coronary angiography;  Surgeon: Bakari Newman MD;  Location: [facility] CATH INVASIVE LOCATION;  Service: Cardiology;  Laterality: N/A;    CARDIAC CATHETERIZATION N/A 2023    Procedure: Left heart cath;  Surgeon: Bakari Newman MD;  Location: [facility] CATH INVASIVE LOCATION;  Service:  Cardiology;  Laterality: N/A;    CARDIAC CATHETERIZATION  2023    CATARACT EXTRACTION, BILATERAL       SECTION      X 1    COLONOSCOPY      HAND SURGERY Right     OVARIAN CYST REMOVAL        General Information       Row Name 10/06/23 0934          Physical Therapy Time and Intention    Document Type therapy note (daily note)  -EM     Mode of Treatment individual therapy;physical therapy  -EM       Row Name 10/06/23 0934          General Information    Existing Precautions/Restrictions fall;cardiac;sternal  -EM               User Key  (r) = Recorded By, (t) = Taken By, (c) = Cosigned By      Initials Name Provider Type    Adelita Carmona PT Physical Therapist                   Mobility       Row Name 10/06/23 0934          Bed Mobility    Bed Mobility sit-supine  -EM     Sit-Supine [place] (Bed Mobility) moderate assist (50% patient effort);2 person assist  -EM       Row Name 10/06/23 0934          Sit-Stand Transfer    Sit-Stand [place] (Transfers) contact guard  -EM       Row Name 10/06/23 0934          Gait/Stairs (Locomotion)    [place] Level (Gait) contact guard;1 person to manage equipment  -EM     Distance in Feet (Gait) 150  -EM     Deviations/Abnormal Patterns (Gait) gait speed decreased;stride length decreased  -EM     Comment, (Gait/Stairs) guarded, decreased arm swing but did let go of heart hugger today while ambulating  -EM               User Key  (r) = Recorded By, (t) = Taken By, (c) = Cosigned By      Initials Name Provider Type    Adelita Carmona PT Physical Therapist                   Obj/Interventions       Row Name 10/06/23 0935          Motor Skills    Therapeutic Exercise other (see comments)  5 reps per cardiac protocol, level 2  -EM               User Key  (r) = Recorded By, (t) = Taken By, (c) = Cosigned By      Initials Name Provider Type    Adelita Carmona PT Physical Therapist                   Goals/Plan    No documentation.                   Clinical Impression       Row Name 10/06/23 0935          Pain    Pretreatment Pain Rating 5/10  -EM     Pain Location - chest  -EM     Pain Intervention(s) Medication (See MAR);Repositioned  -EM       Row Name 10/06/23 0935          Plan of Care Review    Plan of Care Reviewed With patient  -EM     Outcome Evaluation Patient sitting up in recliner, awake and alert, agreeable to therapy, rates pain at 5/10 level. Patient performed 5 reps per cardiac protocol, sit to stand with CGA, ambulated about 150 feet with CGA, no overt LOB, still guarded gait pattern with decreased arm swing and decreased trunk rotation but did let go of heart hugger while ambulating today. Patient returned to supine. Pt progressing well with mobility  -EM       Row Name 10/06/23 0935          Vital Signs    Pre Systolic BP Rehab 143  -EM     Pre Treatment Diastolic BP 73  -EM     Post Systolic BP Rehab 140  -EM     Post Treatment Diastolic BP 62  -EM     Pretreatment Heart Rate (beats/min) 85  -EM     Posttreatment Heart Rate (beats/min) 83  -EM     Pre SpO2 (%) 97  -EM     O2 Delivery Pre Treatment room air  -EM     Post SpO2 (%) 94  -EM     O2 Delivery Post Treatment room air  -EM       Row Name 10/06/23 0935          Positioning and Restraints    Pre-Treatment Position in bed  -EM     Post Treatment Position bed  -EM     In Bed supine;notified nsg  -EM               User Key  (r) = Recorded By, (t) = Taken By, (c) = Cosigned By      Initials Name Provider Type    EM Adelita Cordon, PT Physical Therapist                   Outcome Measures       Row Name 10/06/23 0939          How much help from another person do you currently need...    Turning from your back to your side while in flat bed without using bedrails? 3  -EM     Moving from lying on back to sitting on the side of a flat bed without bedrails? 2  -EM     Moving to and from a bed to a chair (including a wheelchair)? 3  -EM     Standing up from a chair using your  arms (e.g., wheelchair, bedside chair)? 3  -EM     Climbing 3-5 steps with a railing? 3  -EM     To walk in hospital room? 3  -EM     AM-PAC 6 Clicks Score (PT) 17  -EM     Highest level of mobility 5 --> Static standing  -EM               User Key  (r) = Recorded By, (t) = Taken By, (c) = Cosigned By      Initials Name Provider Type    EM Adelita Cordon PT Physical Therapist                                 Physical Therapy Education       Title: PT OT SLP Therapies (In Progress)       Topic: Physical Therapy (In Progress)       Point: Mobility training (Done)       Learning Progress Summary             Patient Acceptance, E, VU by EM at 10/6/2023 0939    Acceptance, E, VU by EM at 10/5/2023 1206                         Point: Home exercise program (Done)       Learning Progress Summary             Patient Acceptance, E, VU by EM at 10/6/2023 0939    Acceptance, E, VU by EM at 10/5/2023 1206                         Point: Body mechanics (Not Started)       Learner Progress:  Not documented in this visit.              Point: Precautions (Not Started)       Learner Progress:  Not documented in this visit.                              User Key       Initials Effective Dates Name Provider Type Discipline     06/16/21 -  Adelita Cordon PT Physical Therapist PT                  PT Recommendation and Plan  Planned Therapy Interventions (PT): bed mobility training, gait training, home exercise program, patient/family education, transfer training  Plan of Care Reviewed With: patient  Outcome Evaluation: Patient sitting up in recliner, awake and alert, agreeable to therapy, rates pain at 5/10 level. Patient performed 5 reps per cardiac protocol, sit to stand with CGA, ambulated about 150 feet with CGA, no overt LOB, still guarded gait pattern with decreased arm swing and decreased trunk rotation but did let go of heart hugger while ambulating today. Patient returned to supine. Pt progressing well with  mobility     Time Calculation:         PT Charges       Row Name 10/06/23 0940             Time Calculation    Start Time 0850  -EM      Stop Time 0906  -EM      Time Calculation (min) 16 min  -EM      PT Received On 10/06/23  -EM      PT - Next Appointment 10/07/23  -EM         Time Calculation- PT    Total Timed Code Minutes- PT 16 minute(s)  -EM         Timed Charges    44453 - PT Therapeutic Exercise Minutes 6  -EM      51870 - PT Therapeutic Activity Minutes 10  -EM         Total Minutes    Timed Charges Total Minutes 16  -EM       Total Minutes 16  -EM                User Key  (r) = Recorded By, (t) = Taken By, (c) = Cosigned By      Initials Name Provider Type    EM Adelita Cordon, PT Physical Therapist                  Therapy Charges for Today       Code Description Service Date Service Provider Modifiers Qty    29701490025 HC PT THERAPEUTIC ACT EA 15 MIN 10/5/2023 Adelita Cordon, PT GP 1    91292888677 HC PT EVAL MOD COMPLEXITY 2 10/5/2023 Adelita Cordon, PT GP 1    33068771974 HC PT THER SUPP EA 15 MIN 10/5/2023 Adelita Cordon, PT GP 1    61079083491 HC PT THERAPEUTIC ACT EA 15 MIN 10/6/2023 Adelita Cordon, PT GP 1            PT G-Codes  Outcome Measure Options: AM-PAC 6 Clicks Basic Mobility (PT)  AM-PAC 6 Clicks Score (PT): 17  PT Discharge Summary  Anticipated Discharge Disposition (PT): home with assist, home with home health    Adelita Cordon PT  10/6/2023

## 2023-10-07 ENCOUNTER — APPOINTMENT (OUTPATIENT)
Dept: GENERAL RADIOLOGY | Facility: HOSPITAL | Age: 73
DRG: 221 | End: 2023-10-07
Payer: MEDICARE

## 2023-10-07 LAB
ANION GAP SERPL CALCULATED.3IONS-SCNC: 8 MMOL/L (ref 5–15)
BUN SERPL-MCNC: 15 MG/DL (ref 8–23)
BUN/CREAT SERPL: 20.3 (ref 7–25)
CALCIUM SPEC-SCNC: 8.8 MG/DL (ref 8.6–10.5)
CHLORIDE SERPL-SCNC: 97 MMOL/L (ref 98–107)
CO2 SERPL-SCNC: 33 MMOL/L (ref 22–29)
CREAT SERPL-MCNC: 0.74 MG/DL (ref 0.57–1)
DEPRECATED RDW RBC AUTO: 42.5 FL (ref 37–54)
EGFRCR SERPLBLD CKD-EPI 2021: 86.1 ML/MIN/1.73
ERYTHROCYTE [DISTWIDTH] IN BLOOD BY AUTOMATED COUNT: 13.8 % (ref 12.3–15.4)
GLUCOSE BLDC GLUCOMTR-MCNC: 108 MG/DL (ref 70–130)
GLUCOSE BLDC GLUCOMTR-MCNC: 144 MG/DL (ref 70–130)
GLUCOSE BLDC GLUCOMTR-MCNC: 158 MG/DL (ref 70–130)
GLUCOSE BLDC GLUCOMTR-MCNC: 168 MG/DL (ref 70–130)
GLUCOSE SERPL-MCNC: 142 MG/DL (ref 65–99)
HCT VFR BLD AUTO: 34 % (ref 34–46.6)
HGB BLD-MCNC: 11.2 G/DL (ref 12–15.9)
MCH RBC QN AUTO: 27.8 PG (ref 26.6–33)
MCHC RBC AUTO-ENTMCNC: 32.9 G/DL (ref 31.5–35.7)
MCV RBC AUTO: 84.4 FL (ref 79–97)
PLATELET # BLD AUTO: 129 10*3/MM3 (ref 140–450)
PMV BLD AUTO: 10 FL (ref 6–12)
POTASSIUM SERPL-SCNC: 4.4 MMOL/L (ref 3.5–5.2)
RBC # BLD AUTO: 4.03 10*6/MM3 (ref 3.77–5.28)
SODIUM SERPL-SCNC: 138 MMOL/L (ref 136–145)
WBC NRBC COR # BLD: 10.3 10*3/MM3 (ref 3.4–10.8)

## 2023-10-07 PROCEDURE — 80048 BASIC METABOLIC PNL TOTAL CA: CPT | Performed by: NURSE PRACTITIONER

## 2023-10-07 PROCEDURE — 71046 X-RAY EXAM CHEST 2 VIEWS: CPT

## 2023-10-07 PROCEDURE — 85027 COMPLETE CBC AUTOMATED: CPT | Performed by: NURSE PRACTITIONER

## 2023-10-07 PROCEDURE — 99024 POSTOP FOLLOW-UP VISIT: CPT | Performed by: THORACIC SURGERY (CARDIOTHORACIC VASCULAR SURGERY)

## 2023-10-07 PROCEDURE — 94799 UNLISTED PULMONARY SVC/PX: CPT

## 2023-10-07 PROCEDURE — 25010000002 ENOXAPARIN PER 10 MG: Performed by: NURSE PRACTITIONER

## 2023-10-07 PROCEDURE — 94761 N-INVAS EAR/PLS OXIMETRY MLT: CPT

## 2023-10-07 PROCEDURE — 63710000001 INSULIN LISPRO (HUMAN) PER 5 UNITS: Performed by: NURSE PRACTITIONER

## 2023-10-07 PROCEDURE — 99232 SBSQ HOSP IP/OBS MODERATE 35: CPT | Performed by: INTERNAL MEDICINE

## 2023-10-07 PROCEDURE — 82948 REAGENT STRIP/BLOOD GLUCOSE: CPT

## 2023-10-07 RX ADMIN — ASPIRIN 81 MG: 81 TABLET, COATED ORAL at 10:12

## 2023-10-07 RX ADMIN — HYDROCODONE BITARTRATE AND ACETAMINOPHEN 2 TABLET: 5; 325 TABLET ORAL at 15:23

## 2023-10-07 RX ADMIN — ATORVASTATIN CALCIUM 40 MG: 20 TABLET, FILM COATED ORAL at 21:10

## 2023-10-07 RX ADMIN — PANTOPRAZOLE SODIUM 40 MG: 40 TABLET, DELAYED RELEASE ORAL at 06:51

## 2023-10-07 RX ADMIN — GUAIFENESIN 1200 MG: 600 TABLET, EXTENDED RELEASE ORAL at 21:10

## 2023-10-07 RX ADMIN — HYDROCODONE BITARTRATE AND ACETAMINOPHEN 2 TABLET: 5; 325 TABLET ORAL at 10:13

## 2023-10-07 RX ADMIN — INSULIN LISPRO 2 UNITS: 100 INJECTION, SOLUTION INTRAVENOUS; SUBCUTANEOUS at 17:46

## 2023-10-07 RX ADMIN — ENOXAPARIN SODIUM 40 MG: 100 INJECTION SUBCUTANEOUS at 10:13

## 2023-10-07 RX ADMIN — HYDROCODONE BITARTRATE AND ACETAMINOPHEN 2 TABLET: 5; 325 TABLET ORAL at 21:28

## 2023-10-07 RX ADMIN — METOPROLOL TARTRATE 25 MG: 25 TABLET, FILM COATED ORAL at 10:12

## 2023-10-07 RX ADMIN — 0.12% CHLORHEXIDINE GLUCONATE 15 ML: 1.2 RINSE ORAL at 06:51

## 2023-10-07 RX ADMIN — MUPIROCIN 1 APPLICATION: 20 OINTMENT TOPICAL at 10:13

## 2023-10-07 RX ADMIN — SENNOSIDES AND DOCUSATE SODIUM 2 TABLET: 50; 8.6 TABLET ORAL at 21:10

## 2023-10-07 RX ADMIN — MUPIROCIN 1 APPLICATION: 20 OINTMENT TOPICAL at 21:10

## 2023-10-07 RX ADMIN — GUAIFENESIN 1200 MG: 600 TABLET, EXTENDED RELEASE ORAL at 10:12

## 2023-10-07 RX ADMIN — METOPROLOL TARTRATE 25 MG: 25 TABLET, FILM COATED ORAL at 21:10

## 2023-10-07 RX ADMIN — HYDROCODONE BITARTRATE AND ACETAMINOPHEN 2 TABLET: 5; 325 TABLET ORAL at 03:03

## 2023-10-07 NOTE — PLAN OF CARE
Goal Outcome Evaluation:  Plan of Care Reviewed With: patient        Progress: improving  Pt alert and oriented x4, RA during day and 2 L PRN at HS, VSS, NSR. Pt went for chest xray this am and was very frustrated when she got back to floor because she felt she was left downstairs for a long time. Pacer wires were removed today, she tolerated this well. Pt is up with stand by assist and has ambulated in the hallway today.

## 2023-10-07 NOTE — PROGRESS NOTES
[facility]  Progress note: 10/7/2023    Patient Identification:  Name:Melina Martin  Age:72 y.o.  Sex: female  :  1950  MRN: [id number]           CC:  Status post AVR    Interval history:  Vital stable.  No complaints overall feels well    Vital Signs:   Temp:  [98.6 øF (37 øC)-99.2 øF (37.3 øC)] 98.6 øF (37 øC)  Heart Rate:  [68-86] 69  Resp:  [22] 22  BP: (101-150)/(48-61) 113/56    Intake/Output Summary (Last 24 hours) at 10/7/2023 1525  Last data filed at 10/7/2023 0900  Gross per 24 hour   Intake 0 ml   Output 1350 ml   Net -1350 ml       Physical Examination:    General Appearance No acute distress   Neck No adenopathy, supple, trachea midline, no thyromegaly, no carotid bruit, no JVD   Lungs Clear to auscultation,respirations regular, even and unlabored   Heart Regular rhythm and normal rate, normal S1 and S2, no murmur, no gallop, no rub, no click   Chest wall No abnormalities observed   Abdomen Normal bowel sounds, no masses, no hepatomegaly, soft   Extremities Moves all extremities well, no edema, no cyanosis, no redness   Neurological Alert and oriented x 3     Lab Review:  Personally reviewed the labs, radiology imaging and other cardiac procedures.   Results from last 7 days   Lab Units 10/07/23  0244   SODIUM mmol/L 138   POTASSIUM mmol/L 4.4   CHLORIDE mmol/L 97*   CO2 mmol/L 33.0*   BUN mg/dL 15   CREATININE mg/dL 0.74   CALCIUM mg/dL 8.8   GLUCOSE mg/dL 142*         Results from last 7 days   Lab Units 10/07/23  0244 10/06/23  0315 10/05/23  0252   WBC 10*3/mm3 10.30 12.84* 8.55   HEMOGLOBIN g/dL 11.2* 10.9* 11.2*   HEMATOCRIT % 34.0 34.1 34.7   PLATELETS 10*3/mm3 129* 133* 106*     Results from last 7 days   Lab Units 10/05/23  0252 10/04/23  1731   INR  1.21* 1.34*   APTT seconds  --  30.1     Medication Review:   Meds reviewed  Scheduled Meds:aspirin, 81 mg, Oral, Daily  atorvastatin, 40 mg, Oral, Nightly  chlorhexidine, 15 mL, Mouth/Throat, Q12H  enoxaparin, 40 mg,  Subcutaneous, Daily  guaiFENesin, 1,200 mg, Oral, Q12H  insulin lispro, 2-9 Units, Subcutaneous, 4x Daily AC & at Bedtime  metoprolol tartrate, 25 mg, Oral, Q12H  mupirocin, , Each Nare, BID  pantoprazole, 40 mg, Oral, QAM  senna-docusate sodium, 2 tablet, Oral, Nightly      Continuous Infusions:sodium chloride, 30 mL/hr, Last Rate: 30 mL/hr (10/04/23 1800)      I personally viewed and interpreted the patient's EKG/Telemetry data    Assessment and Plan  1.  Severe aortic valve stenosis, status post SAVR via minithoracotomy with tissue valve prosthesis.  Hopefully home tomorrow.  Doing well.  2.  Left-sided breast cancer with history of lumpectomy and radiation therapy 2019  3.  Hypertension   4.  Postop thrombocytopenia.  Improved          Mini Rangel  10/7/084696:25 EDT  25min spent in reviewing records, discussion and examination of the patient and discussion with other members of the patient's medical team.     Dictated utilizing Dragon dictation

## 2023-10-07 NOTE — SIGNIFICANT NOTE
10/07/23 1305   OTHER   Discipline physical therapist   Rehab Time/Intention   Session Not Performed patient/family declined treatment  (pt had just returned from x ray and declined to participate with PT)

## 2023-10-07 NOTE — PROGRESS NOTES
-Severe aortic stenosis- s/p mini sternotomy AVR (tissue), annular rook enlargement with pericardial patch and the modified nicks procedure-- POD#3 [place]  -Hypertension  -Obesity   -TCP- plt count 106     Looks good this morning  Up in the chair  1L NC--wean as able  Sinus rhythm rate in the 80s-- tolerating beta blocker  Encourage pulmonary toilet-- continue mucinex and flutter valve  Mobilize  Discontinue AV wires  Will check overnight tonight  May be able to go home with home health tomorrow   Continue routine care         CARLOS Malcolm  10/07/23  08:03 EDT

## 2023-10-07 NOTE — PLAN OF CARE
Problem: Adult Inpatient Plan of Care  Goal: Plan of Care Review  Outcome: Ongoing, Progressing  Flowsheets (Taken 10/7/2023 0415)  Progress: improving  Plan of Care Reviewed With: patient  Outcome Evaluation: Pt is AOx4. All vs stable. Pain treated per MAR. Sleep well. No distress noted overnight. Safety maintained. Plan of care ongoing  Goal: Patient-Specific Goal (Individualized)  Outcome: Ongoing, Progressing  Goal: Absence of Hospital-Acquired Illness or Injury  Outcome: Ongoing, Progressing  Intervention: Identify and Manage Fall Risk  Description: Perform standard risk assessment on admission using a validated tool or comprehensive approach appropriate to the patient; reassess fall risk frequently, with change in status or transfer to another level of care.  Communicate fall injury risk to interprofessional healthcare team.  Determine need for increased observation, equipment and environmental modification, such as low bed, signage and supportive, nonskid footwear.  Adjust safety measures to individual developmental age, stage and identified risk factors.  Reinforce the importance of safety and physical activity with patient and family.  Perform regular intentional rounding to assess need for position change, pain assessment and personal needs, including assistance with toileting.  Recent Flowsheet Documentation  Taken 10/7/2023 0200 by Guido Brooks, RN  Safety Promotion/Fall Prevention:   nonskid shoes/slippers when out of bed   safety round/check completed  Taken 10/7/2023 0038 by Guido Brooks, RN  Safety Promotion/Fall Prevention:   assistive device/personal items within reach   activity supervised   mobility aid in reach   lighting adjusted   room organization consistent   safety round/check completed  Taken 10/6/2023 2200 by Guido Brooks, RN  Safety Promotion/Fall Prevention:   activity supervised   assistive device/personal items within reach   fall prevention program maintained   clutter free  environment maintained   muscle strengthening facilitated   nonskid shoes/slippers when out of bed   safety round/check completed   room organization consistent  Taken 10/6/2023 2026 by Guido Brooks RN  Safety Promotion/Fall Prevention:   assistive device/personal items within reach   activity supervised   mobility aid in reach   lighting adjusted   safety round/check completed   room organization consistent  Intervention: Prevent Skin Injury  Description: Perform a screening for skin injury risk, such as pressure or moisture associated skin damage on admission and at regular intervals throughout hospital stay.  Keep all areas of skin (especially folds) clean and dry.  Maintain adequate skin hydration.  Relieve and redistribute pressure and protect bony prominences; implement measures based on patient-specific risk factors.  Match turning and repositioning schedule to clinical condition.  Encourage weight shift frequently; assist with reposition if unable to complete independently.  Float heels off bed; avoid pressure on the Achilles tendon.  Keep skin free from extended contact with medical devices.  Encourage functional activity and mobility, as early as tolerated.  Use aids (e.g., slide boards, mechanical lift) during transfer.  Recent Flowsheet Documentation  Taken 10/7/2023 0200 by Guido Brooks RN  Body Position: position changed independently  Taken 10/7/2023 0038 by Guido Brooks RN  Body Position: position changed independently  Taken 10/6/2023 2200 by Guido Brooks RN  Body Position: position changed independently  Intervention: Prevent and Manage VTE (Venous Thromboembolism) Risk  Description: Assess for VTE (venous thromboembolism) risk.  Encourage and assist with early ambulation.  Initiate and maintain compression or other therapy, as indicated, based on identified risk in accordance with organizational protocol and provider order.  Encourage both active and passive leg exercises while in bed, if  unable to ambulate.  Recent Flowsheet Documentation  Taken 10/7/2023 0200 by Guido Brooks RN  Activity Management: activity encouraged  Taken 10/7/2023 0038 by Guido Brooks RN  Activity Management: activity encouraged  Taken 10/6/2023 2200 by Guido Brooks RN  Activity Management:   back to bed   activity encouraged  Taken 10/6/2023 2026 by Guido Brooks RN  Activity Management: up in chair  VTE Prevention/Management:   bilateral   compression stockings on  Intervention: Prevent Infection  Description: Maintain skin and mucous membrane integrity; promote hand, oral and pulmonary hygiene.  Optimize fluid balance, nutrition, sleep and glycemic control to maximize infection resistance.  Identify potential sources of infection early to prevent or mitigate progression of infection (e.g., wound, lines, devices).  Evaluate ongoing need for invasive devices; remove promptly when no longer indicated.  Recent Flowsheet Documentation  Taken 10/7/2023 0200 by Guido Brooks RN  Infection Prevention:   visitors restricted/screened   single patient room provided   rest/sleep promoted   personal protective equipment utilized   equipment surfaces disinfected   hand hygiene promoted   environmental surveillance performed   cohorting utilized  Taken 10/7/2023 0038 by Guido Brooks RN  Infection Prevention:   visitors restricted/screened   single patient room provided   hand hygiene promoted   equipment surfaces disinfected   environmental surveillance performed   cohorting utilized   personal protective equipment utilized   rest/sleep promoted  Taken 10/6/2023 2200 by Guido Brooks RN  Infection Prevention:   visitors restricted/screened   single patient room provided   cohorting utilized   environmental surveillance performed   equipment surfaces disinfected   hand hygiene promoted   personal protective equipment utilized   rest/sleep promoted  Taken 10/6/2023 2026 by Guido Brooks RN  Infection Prevention:   visitors  restricted/screened   single patient room provided   rest/sleep promoted   personal protective equipment utilized   hand hygiene promoted   equipment surfaces disinfected   environmental surveillance performed   cohorting utilized  Goal: Optimal Comfort and Wellbeing  Outcome: Ongoing, Progressing  Intervention: Provide Person-Centered Care  Description: Use a family-focused approach to care.  Develop trust and rapport by proactively providing information, encouraging questions, addressing concerns and offering reassurance.  Acknowledge emotional response to hospitalization.  Recognize and utilize personal coping strategies.  Honor spiritual and cultural preferences.  Recent Flowsheet Documentation  Taken 10/7/2023 0038 by Guido Brooks RN  Trust Relationship/Rapport:   thoughts/feelings acknowledged   reassurance provided   questions encouraged   questions answered   empathic listening provided   choices provided   care explained   emotional support provided  Taken 10/6/2023 2026 by Guido Brooks RN  Trust Relationship/Rapport:   thoughts/feelings acknowledged   reassurance provided   emotional support provided   care explained   choices provided   empathic listening provided   questions answered   questions encouraged  Goal: Readiness for Transition of Care  Outcome: Ongoing, Progressing   Goal Outcome Evaluation:  Plan of Care Reviewed With: patient        Progress: improving  Outcome Evaluation: Pt is AOx4. All vs stable. Pain treated per MAR. Sleep well. No distress noted overnight. Safety maintained. Plan of care ongoing

## 2023-10-08 LAB
ANION GAP SERPL CALCULATED.3IONS-SCNC: 7.6 MMOL/L (ref 5–15)
BUN SERPL-MCNC: 12 MG/DL (ref 8–23)
BUN/CREAT SERPL: 20.7 (ref 7–25)
CALCIUM SPEC-SCNC: 8.6 MG/DL (ref 8.6–10.5)
CHLORIDE SERPL-SCNC: 96 MMOL/L (ref 98–107)
CO2 SERPL-SCNC: 32.4 MMOL/L (ref 22–29)
CREAT SERPL-MCNC: 0.58 MG/DL (ref 0.57–1)
DEPRECATED RDW RBC AUTO: 41.6 FL (ref 37–54)
EGFRCR SERPLBLD CKD-EPI 2021: 96.3 ML/MIN/1.73
ERYTHROCYTE [DISTWIDTH] IN BLOOD BY AUTOMATED COUNT: 13.7 % (ref 12.3–15.4)
GLUCOSE BLDC GLUCOMTR-MCNC: 124 MG/DL (ref 70–130)
GLUCOSE BLDC GLUCOMTR-MCNC: 153 MG/DL (ref 70–130)
GLUCOSE BLDC GLUCOMTR-MCNC: 159 MG/DL (ref 70–130)
GLUCOSE BLDC GLUCOMTR-MCNC: 192 MG/DL (ref 70–130)
GLUCOSE SERPL-MCNC: 124 MG/DL (ref 65–99)
HCT VFR BLD AUTO: 32.1 % (ref 34–46.6)
HGB BLD-MCNC: 10.4 G/DL (ref 12–15.9)
MCH RBC QN AUTO: 27.2 PG (ref 26.6–33)
MCHC RBC AUTO-ENTMCNC: 32.4 G/DL (ref 31.5–35.7)
MCV RBC AUTO: 83.8 FL (ref 79–97)
PLATELET # BLD AUTO: 159 10*3/MM3 (ref 140–450)
PMV BLD AUTO: 9.6 FL (ref 6–12)
POTASSIUM SERPL-SCNC: 4.2 MMOL/L (ref 3.5–5.2)
RBC # BLD AUTO: 3.83 10*6/MM3 (ref 3.77–5.28)
SODIUM SERPL-SCNC: 136 MMOL/L (ref 136–145)
WBC NRBC COR # BLD: 7.46 10*3/MM3 (ref 3.4–10.8)

## 2023-10-08 PROCEDURE — 80048 BASIC METABOLIC PNL TOTAL CA: CPT | Performed by: NURSE PRACTITIONER

## 2023-10-08 PROCEDURE — 94762 N-INVAS EAR/PLS OXIMTRY CONT: CPT

## 2023-10-08 PROCEDURE — 97110 THERAPEUTIC EXERCISES: CPT

## 2023-10-08 PROCEDURE — 63710000001 INSULIN LISPRO (HUMAN) PER 5 UNITS: Performed by: NURSE PRACTITIONER

## 2023-10-08 PROCEDURE — 85027 COMPLETE CBC AUTOMATED: CPT | Performed by: NURSE PRACTITIONER

## 2023-10-08 PROCEDURE — 94799 UNLISTED PULMONARY SVC/PX: CPT

## 2023-10-08 PROCEDURE — 99231 SBSQ HOSP IP/OBS SF/LOW 25: CPT | Performed by: INTERNAL MEDICINE

## 2023-10-08 PROCEDURE — 25010000002 ENOXAPARIN PER 10 MG: Performed by: NURSE PRACTITIONER

## 2023-10-08 PROCEDURE — 82948 REAGENT STRIP/BLOOD GLUCOSE: CPT

## 2023-10-08 RX ORDER — CYCLOBENZAPRINE HCL 5 MG
5 TABLET ORAL EVERY 8 HOURS PRN
Qty: 30 TABLET | Refills: 0 | Status: CANCELLED | OUTPATIENT
Start: 2023-10-08

## 2023-10-08 RX ORDER — METOPROLOL TARTRATE 50 MG/1
50 TABLET, FILM COATED ORAL EVERY 12 HOURS SCHEDULED
Qty: 60 TABLET | Refills: 2 | Status: CANCELLED | OUTPATIENT
Start: 2023-10-08 | End: 2024-01-06

## 2023-10-08 RX ORDER — METOPROLOL TARTRATE 50 MG/1
50 TABLET, FILM COATED ORAL EVERY 12 HOURS SCHEDULED
Status: DISCONTINUED | OUTPATIENT
Start: 2023-10-08 | End: 2023-10-09 | Stop reason: HOSPADM

## 2023-10-08 RX ORDER — HYDROCODONE BITARTRATE AND ACETAMINOPHEN 5; 325 MG/1; MG/1
1 TABLET ORAL EVERY 4 HOURS PRN
Qty: 42 TABLET | Refills: 0 | Status: CANCELLED | OUTPATIENT
Start: 2023-10-08 | End: 2023-10-15

## 2023-10-08 RX ADMIN — INSULIN LISPRO 2 UNITS: 100 INJECTION, SOLUTION INTRAVENOUS; SUBCUTANEOUS at 21:57

## 2023-10-08 RX ADMIN — ATORVASTATIN CALCIUM 40 MG: 20 TABLET, FILM COATED ORAL at 21:57

## 2023-10-08 RX ADMIN — GUAIFENESIN 1200 MG: 600 TABLET, EXTENDED RELEASE ORAL at 21:57

## 2023-10-08 RX ADMIN — MUPIROCIN 1 APPLICATION: 20 OINTMENT TOPICAL at 21:57

## 2023-10-08 RX ADMIN — INSULIN LISPRO 2 UNITS: 100 INJECTION, SOLUTION INTRAVENOUS; SUBCUTANEOUS at 13:08

## 2023-10-08 RX ADMIN — POLYETHYLENE GLYCOL 3350 17 G: 17 POWDER, FOR SOLUTION ORAL at 13:37

## 2023-10-08 RX ADMIN — 0.12% CHLORHEXIDINE GLUCONATE 15 ML: 1.2 RINSE ORAL at 06:04

## 2023-10-08 RX ADMIN — METOPROLOL TARTRATE 50 MG: 50 TABLET, FILM COATED ORAL at 09:04

## 2023-10-08 RX ADMIN — INSULIN LISPRO 2 UNITS: 100 INJECTION, SOLUTION INTRAVENOUS; SUBCUTANEOUS at 18:11

## 2023-10-08 RX ADMIN — ASPIRIN 81 MG: 81 TABLET, COATED ORAL at 09:04

## 2023-10-08 RX ADMIN — GUAIFENESIN 1200 MG: 600 TABLET, EXTENDED RELEASE ORAL at 09:05

## 2023-10-08 RX ADMIN — SENNOSIDES AND DOCUSATE SODIUM 2 TABLET: 50; 8.6 TABLET ORAL at 21:57

## 2023-10-08 RX ADMIN — HYDROCODONE BITARTRATE AND ACETAMINOPHEN 2 TABLET: 5; 325 TABLET ORAL at 03:58

## 2023-10-08 RX ADMIN — BISACODYL 10 MG: 5 TABLET, COATED ORAL at 21:58

## 2023-10-08 RX ADMIN — HYDROCODONE BITARTRATE AND ACETAMINOPHEN 2 TABLET: 5; 325 TABLET ORAL at 13:37

## 2023-10-08 RX ADMIN — METOPROLOL TARTRATE 50 MG: 50 TABLET, FILM COATED ORAL at 21:57

## 2023-10-08 RX ADMIN — PANTOPRAZOLE SODIUM 40 MG: 40 TABLET, DELAYED RELEASE ORAL at 06:03

## 2023-10-08 RX ADMIN — ENOXAPARIN SODIUM 40 MG: 100 INJECTION SUBCUTANEOUS at 09:05

## 2023-10-08 NOTE — PLAN OF CARE
Goal Outcome Evaluation:      Received pt from CVU, pt alert and oriented S/P AVR pos op day 4 and pt will dc in am. No complaints at this time

## 2023-10-08 NOTE — PLAN OF CARE
Goal Outcome Evaluation:  Plan of Care Reviewed With: patient        Progress: improving  Outcome Evaluation: Pt is AOx4. All vs stable. Pain treated per MAR. Sleep well. No distress noted overnight. Safety maintained. Plan of care ongoing         Problem: Adult Inpatient Plan of Care  Goal: Plan of Care Review  Outcome: Ongoing, Progressing  Flowsheets (Taken 10/8/2023 0430)  Progress: improving  Plan of Care Reviewed With: patient  Goal: Patient-Specific Goal (Individualized)  Outcome: Ongoing, Progressing  Goal: Absence of Hospital-Acquired Illness or Injury  Outcome: Ongoing, Progressing  Intervention: Identify and Manage Fall Risk  Description: Perform standard risk assessment on admission using a validated tool or comprehensive approach appropriate to the patient; reassess fall risk frequently, with change in status or transfer to another level of care.  Communicate fall injury risk to interprofessional healthcare team.  Determine need for increased observation, equipment and environmental modification, such as low bed, signage and supportive, nonskid footwear.  Adjust safety measures to individual developmental age, stage and identified risk factors.  Reinforce the importance of safety and physical activity with patient and family.  Perform regular intentional rounding to assess need for position change, pain assessment and personal needs, including assistance with toileting.  Recent Flowsheet Documentation  Taken 10/8/2023 0020 by Guido Brooks RN  Safety Promotion/Fall Prevention:   assistive device/personal items within reach   activity supervised  Taken 10/7/2023 2253 by Guido Brooks RN  Safety Promotion/Fall Prevention:   activity supervised   assistive device/personal items within reach   nonskid shoes/slippers when out of bed   muscle strengthening facilitated   room organization consistent   safety round/check completed  Taken 10/7/2023 2030 by Guido Brooks RN  Safety Promotion/Fall Prevention:    activity supervised   assistive device/personal items within reach   lighting adjusted   mobility aid in reach   room organization consistent   safety round/check completed   nonskid shoes/slippers when out of bed   fall prevention program maintained   clutter free environment maintained  Intervention: Prevent Skin Injury  Description: Perform a screening for skin injury risk, such as pressure or moisture associated skin damage on admission and at regular intervals throughout hospital stay.  Keep all areas of skin (especially folds) clean and dry.  Maintain adequate skin hydration.  Relieve and redistribute pressure and protect bony prominences; implement measures based on patient-specific risk factors.  Match turning and repositioning schedule to clinical condition.  Encourage weight shift frequently; assist with reposition if unable to complete independently.  Float heels off bed; avoid pressure on the Achilles tendon.  Keep skin free from extended contact with medical devices.  Encourage functional activity and mobility, as early as tolerated.  Use aids (e.g., slide boards, mechanical lift) during transfer.  Recent Flowsheet Documentation  Taken 10/8/2023 0020 by Guido Brooks RN  Body Position: position changed independently  Taken 10/7/2023 2253 by Guido Brooks RN  Body Position: position changed independently  Taken 10/7/2023 2030 by Guido Brooks RN  Body Position: position changed independently  Intervention: Prevent and Manage VTE (Venous Thromboembolism) Risk  Description: Assess for VTE (venous thromboembolism) risk.  Encourage and assist with early ambulation.  Initiate and maintain compression or other therapy, as indicated, based on identified risk in accordance with organizational protocol and provider order.  Encourage both active and passive leg exercises while in bed, if unable to ambulate.  Recent Flowsheet Documentation  Taken 10/8/2023 0020 by Guido Brooks RN  Activity Management: activity  encouraged  VTE Prevention/Management:   bilateral   compression stockings on  Taken 10/7/2023 2253 by Guido Brooks RN  Activity Management: activity encouraged  Taken 10/7/2023 2030 by Guido Brooks RN  Activity Management: activity encouraged  Intervention: Prevent Infection  Description: Maintain skin and mucous membrane integrity; promote hand, oral and pulmonary hygiene.  Optimize fluid balance, nutrition, sleep and glycemic control to maximize infection resistance.  Identify potential sources of infection early to prevent or mitigate progression of infection (e.g., wound, lines, devices).  Evaluate ongoing need for invasive devices; remove promptly when no longer indicated.  Recent Flowsheet Documentation  Taken 10/8/2023 0020 by Guido Brooks RN  Infection Prevention:   visitors restricted/screened   single patient room provided   rest/sleep promoted   personal protective equipment utilized   hand hygiene promoted   equipment surfaces disinfected   cohorting utilized   environmental surveillance performed  Taken 10/7/2023 2253 by Guido Brooks RN  Infection Prevention:   visitors restricted/screened   rest/sleep promoted   single patient room provided   hand hygiene promoted   environmental surveillance performed   cohorting utilized   equipment surfaces disinfected   personal protective equipment utilized  Taken 10/7/2023 2030 by Guido Brooks RN  Infection Prevention:   visitors restricted/screened   single patient room provided   rest/sleep promoted   personal protective equipment utilized   hand hygiene promoted   equipment surfaces disinfected   cohorting utilized   environmental surveillance performed  Goal: Optimal Comfort and Wellbeing  Outcome: Ongoing, Progressing  Intervention: Provide Person-Centered Care  Description: Use a family-focused approach to care.  Develop trust and rapport by proactively providing information, encouraging questions, addressing concerns and offering  reassurance.  Acknowledge emotional response to hospitalization.  Recognize and utilize personal coping strategies.  Honor spiritual and cultural preferences.  Recent Flowsheet Documentation  Taken 10/8/2023 0020 by Guido Brooks RN  Trust Relationship/Rapport:   thoughts/feelings acknowledged   reassurance provided   questions encouraged   empathic listening provided   emotional support provided   choices provided   care explained  Taken 10/7/2023 2030 by Guido Brooks, RN  Trust Relationship/Rapport:   thoughts/feelings acknowledged   reassurance provided   questions encouraged   questions answered   empathic listening provided   choices provided   care explained  Goal: Readiness for Transition of Care  Outcome: Ongoing, Progressing

## 2023-10-08 NOTE — PROGRESS NOTES
[facility]  Progress note: 10/8/2023    Patient Identification:  Name:Melina Martin  Age:72 y.o.  Sex: female  :  1950  MRN: [id number]           CC:  Aortic valve replacement    Interval history:  Vital stable.  Ambulating her room slowly.  Chest wall pain improved    Vital Signs:   Temp:  [98.1 øF (36.7 øC)-99.1 øF (37.3 øC)] 99 øF (37.2 øC)  Heart Rate:  [77-85] 79  Resp:  [18-20] 18  BP: (125-151)/(59-98) 147/64    Intake/Output Summary (Last 24 hours) at 10/8/2023 1756  Last data filed at 10/8/2023 0938  Gross per 24 hour   Intake 240 ml   Output --   Net 240 ml       Physical Examination:    General Appearance No acute distress   Neck No adenopathy, supple, trachea midline, no thyromegaly, no carotid bruit, no JVD   Lungs Clear to auscultation,respirations regular, even and unlabored   Heart Regular rhythm and normal rate, normal S1 and S2, no murmur, no gallop, no rub, no click   Chest wall No abnormalities observed   Abdomen Normal bowel sounds, no masses, no hepatomegaly, soft   Extremities Moves all extremities well, no edema, no cyanosis, no redness   Neurological Alert and oriented x 3     Lab Review:  Personally reviewed the labs, radiology imaging and other cardiac procedures.   Results from last 7 days   Lab Units 10/08/23  0257   SODIUM mmol/L 136   POTASSIUM mmol/L 4.2   CHLORIDE mmol/L 96*   CO2 mmol/L 32.4*   BUN mg/dL 12   CREATININE mg/dL 0.58   CALCIUM mg/dL 8.6   GLUCOSE mg/dL 124*         Results from last 7 days   Lab Units 10/08/23  0257 10/07/23  0244 10/06/23  0315   WBC 10*3/mm3 7.46 10.30 12.84*   HEMOGLOBIN g/dL 10.4* 11.2* 10.9*   HEMATOCRIT % 32.1* 34.0 34.1   PLATELETS 10*3/mm3 159 129* 133*     Results from last 7 days   Lab Units 10/05/23  0252 10/04/23  1731   INR  1.21* 1.34*   APTT seconds  --  30.1     Medication Review:   Meds reviewed  Scheduled Meds:aspirin, 81 mg, Oral, Daily  atorvastatin, 40 mg, Oral, Nightly  chlorhexidine, 15 mL, Mouth/Throat,  Q12H  enoxaparin, 40 mg, Subcutaneous, Daily  guaiFENesin, 1,200 mg, Oral, Q12H  insulin lispro, 2-9 Units, Subcutaneous, 4x Daily AC & at Bedtime  metoprolol tartrate, 50 mg, Oral, Q12H  mupirocin, , Each Nare, BID  pantoprazole, 40 mg, Oral, QAM  senna-docusate sodium, 2 tablet, Oral, Nightly      Continuous Infusions:sodium chloride, 30 mL/hr, Last Rate: 30 mL/hr (10/04/23 1800)      I personally viewed and interpreted the patient's EKG/Telemetry data    Assessment and Plan    1.  Severe aortic valve stenosis, status post SAVR via minithoracotomy with tissue valve prosthesis.  Anticipate home tomorrow resolved  2.  Left-sided breast cancer with history of lumpectomy and radiation therapy 2019  3.  Hypertension   4.  Postop thrombocytopenia.  Resolved       Mini Rangel  10/8/140623:53 EDT  20min spent in reviewing records, discussion and examination of the patient and discussion with other members of the patient's medical team.     Dictated utilizing Dragon dictation

## 2023-10-08 NOTE — PLAN OF CARE
Goal Outcome Evaluation:  Plan of Care Reviewed With: patient           Outcome Evaluation: Pt has shown steady improvement and is walking with supervision, 200 ft, pt has met goals of PT and is safe to cont walking with nursing staff, pt plans home tomorrow with daughter

## 2023-10-08 NOTE — PROGRESS NOTES
-Severe aortic stenosis- s/p mini sternotomy AVR (tissue), annular rook enlargement with pericardial patch and the modified nicks procedure-- POD#4 Pagni  -Hypertension  -Obesity   -TCP- plt count 106     Looks good this morning  Up in the chair  On room air-- tolerating  Overnight oximetry completed overnight however the results having not been scanned-- will try to locate  Sinus rhythm rate in the 80s-- tolerating beta blocker-- hypertensive this morning-- will increase beta blocker  Encourage pulmonary toilet-- continue mucinex and flutter valve  Mobilize  Ready to go home today however patient would like to go home tomorrow due to her daughter's vehicle being uncomfortable to ride home in.   Continue routine care         Homa Dunn, CARLOS  10/08/23  08:19 EDT

## 2023-10-08 NOTE — THERAPY TREATMENT NOTE
Patient Name: Melina Martin  : 1950    MRN: [id number]                              Today's Date: 10/8/2023       Admit Date: 10/4/2023    Visit Dx:     ICD-10-CM ICD-9-CM   1. Nonrheumatic aortic valve stenosis  I35.0 424.1   2. Abnormal finding of blood chemistry, unspecified  R79.9 790.6   3. Chronic diastolic (congestive) heart failure  I50.32 428.32     428.0   4. Essential (primary) hypertension  I10 401.9   5. Abnormal coagulation profile  R79.1 790.92     Patient Active Problem List   Diagnosis    Breast neoplasm, Tis (DCIS), left    Primary hypertension    Nonrheumatic aortic valve stenosis    Hyperlipidemia LDL goal <100    Aortic stenosis     Past Medical History:   Diagnosis Date    Anxiety     Aortic stenosis     Aortic stenosis 10/4/2023    Cataract     DCIS (ductal carcinoma in situ)     2019: left sided, lumpectomy/XRT/anastrozole    GERD (gastroesophageal reflux disease)     Heart murmur     History of chronic sinusitis     HTN (hypertension)     Hyperlipidemia      Past Surgical History:   Procedure Laterality Date    AORTIC VALVE REPAIR/REPLACEMENT N/A 10/4/2023    Procedure: CHELA; AORTIC VALVE REPAIR/REPLACEMENT with MINI J STERNOTOMY; ANWALL ROOT ENLARGEMENT; TRANSESOPHAGEAL ECHOCARDIOGRAM WITH ANESTHESIA;  Surgeon: Wali Boss MD;  Location: [facility];  Service: Cardiothoracic;  Laterality: N/A;    BREAST BIOPSY Left     MALIGNANT(LOW GRADE DUCTAL CARCINOMA)    BREAST LUMPECTOMY Left 2019    Procedure: Left breast needle localized lumpectomy,;  Surgeon: Beatriz Fuentes MD;  [place] [facility] [place] [facility]  Service: General    CARDIAC CATHETERIZATION N/A 2023    Procedure: Coronary angiography;  Surgeon: Bakari Newman MD;  Location: [facility] CATH INVASIVE LOCATION;  Service: Cardiology;  Laterality: N/A;    CARDIAC CATHETERIZATION N/A 2023    Procedure: Left heart cath;  Surgeon: Bakari Newman MD;  Location: [facility] CATH INVASIVE LOCATION;  Service:  Cardiology;  Laterality: N/A;    CARDIAC CATHETERIZATION  2023    CATARACT EXTRACTION, BILATERAL       SECTION      X 1    COLONOSCOPY      HAND SURGERY Right     OVARIAN CYST REMOVAL        General Information       Row Name 10/08/23 1241          Physical Therapy Time and Intention    Document Type therapy note (daily note)  -PC     Mode of Treatment physical therapy  -PC       Row Name 10/08/23 1241          General Information    Existing Precautions/Restrictions fall;sternal;cardiac  -PC       Row Name 10/08/23 1241          Cognition    Orientation Status (Cognition) oriented x 4  -PC               User Key  (r) = Recorded By, (t) = Taken By, (c) = Cosigned By      Initials Name Provider Type    PC Lyndsay Vaz, PT Physical Therapist                   Mobility       Row Name 10/08/23 1241          Bed Mobility    Sit-Supine [place] (Bed Mobility) independent  -PC       Row Name 10/08/23 1241          Sit-Stand Transfer    Sit-Stand [place] (Transfers) independent  -PC       Row Name 10/08/23 1241          Gait/Stairs (Locomotion)    [place] Level (Gait) supervision  -PC     Distance in Feet (Gait) 200 ft  -PC     Deviations/Abnormal Patterns (Gait) gait speed decreased;stride length decreased  -PC     Comment, (Gait/Stairs) gait pattern normalizing, no loss of balance  -PC               User Key  (r) = Recorded By, (t) = Taken By, (c) = Cosigned By      Initials Name Provider Type    PC Lyndsay Vaz, PT Physical Therapist                   Obj/Interventions       Row Name 10/08/23 1242          Motor Skills    Therapeutic Exercise --  reviewed home walking program  -PC               User Key  (r) = Recorded By, (t) = Taken By, (c) = Cosigned By      Initials Name Provider Type    PC Lyndsay Vaz, PT Physical Therapist                   Goals/Plan    No documentation.                  Clinical Impression       Row Name 10/08/23 1244          Pain    Pretreatment Pain Rating  3/10  -PC       Row Name 10/08/23 1244          Plan of Care Review    Plan of Care Reviewed With patient  -PC     Outcome Evaluation Pt has shown steady improvement and is walking with supervision, 200 ft, pt has met goals of PT and is safe to cont walking with nursing staff, pt plans home tomorrow with daughter  -PC       Row Name 10/08/23 1244          Vital Signs    Pre SpO2 (%) 90  -PC     O2 Delivery Pre Treatment room air  -PC       Row Name 10/08/23 1244          Positioning and Restraints    Pre-Treatment Position in bed  -PC     Post Treatment Position bed  -PC     In Bed supine;call light within reach;encouraged to call for assist  -PC               User Key  (r) = Recorded By, (t) = Taken By, (c) = Cosigned By      Initials Name Provider Type    Lyndsay Mena, PT Physical Therapist                   Outcome Measures       Row Name 10/08/23 1245          How much help from another person do you currently need...    Turning from your back to your side while in flat bed without using bedrails? 4  -PC     Moving from lying on back to sitting on the side of a flat bed without bedrails? 4  -PC     Moving to and from a bed to a chair (including a wheelchair)? 4  -PC     Standing up from a chair using your arms (e.g., wheelchair, bedside chair)? 4  -PC     Climbing 3-5 steps with a railing? 3  -PC     To walk in hospital room? 4  -PC     AM-PAC 6 Clicks Score (PT) 23  -PC     Highest level of mobility 7 --> Walked 25 feet or more  -PC               User Key  (r) = Recorded By, (t) = Taken By, (c) = Cosigned By      Initials Name Provider Type    Lyndsay Mena, PT Physical Therapist                                 Physical Therapy Education       Title: PT OT SLP Therapies (Done)       Topic: Physical Therapy (Done)       Point: Mobility training (Done)       Learning Progress Summary             Patient Acceptance, E,D, DU by PC at 10/8/2023 1246    Acceptance, EVIVIAN by EM at 10/6/2023 0986     Acceptance, E, VU by EM at 10/5/2023 1206                         Point: Home exercise program (Done)       Learning Progress Summary             Patient Acceptance, E,D, DU by PC at 10/8/2023 1246    Acceptance, E, VU by EM at 10/6/2023 0939    Acceptance, E, VU by EM at 10/5/2023 1206                         Point: Body mechanics (Done)       Learning Progress Summary             Patient Acceptance, E,D, DU by PC at 10/8/2023 1246                         Point: Precautions (Done)       Learning Progress Summary             Patient Acceptance, E,D, DU by PC at 10/8/2023 1246                                         User Key       Initials Effective Dates Name Provider Type Discipline    PC 06/16/21 -  Lyndsay Vaz PT Physical Therapist PT    EM 06/16/21 -  Adelita Cordon PT Physical Therapist PT                  PT Recommendation and Plan     Plan of Care Reviewed With: patient  Outcome Evaluation: Pt has shown steady improvement and is walking with supervision, 200 ft, pt has met goals of PT and is safe to cont walking with nursing staff, pt plans home tomorrow with daughter     Time Calculation:         PT Charges       Row Name 10/08/23 1246             Time Calculation    Start Time 1151  -PC      Stop Time 1201  -PC      Time Calculation (min) 10 min  -PC      PT Received On 10/08/23  -PC                User Key  (r) = Recorded By, (t) = Taken By, (c) = Cosigned By      Initials Name Provider Type    PC Lyndsay Vaz, PT Physical Therapist                  Therapy Charges for Today       Code Description Service Date Service Provider Modifiers Qty    83452639339 HC PT THER PROC EA 15 MIN 10/8/2023 Lyndsay Vaz PT GP 1            PT G-Codes  Outcome Measure Options: AM-PAC 6 Clicks Basic Mobility (PT)  AM-PAC 6 Clicks Score (PT): 23       Lyndsay Vaz PT  10/8/2023

## 2023-10-09 ENCOUNTER — TELEPHONE (OUTPATIENT)
Dept: CARDIOLOGY | Facility: CLINIC | Age: 73
End: 2023-10-09
Payer: MEDICARE

## 2023-10-09 ENCOUNTER — READMISSION MANAGEMENT (OUTPATIENT)
Dept: CALL CENTER | Facility: HOSPITAL | Age: 73
End: 2023-10-09
Payer: MEDICARE

## 2023-10-09 VITALS
TEMPERATURE: 98.4 F | HEIGHT: 64 IN | OXYGEN SATURATION: 90 % | BODY MASS INDEX: 30.59 KG/M2 | RESPIRATION RATE: 18 BRPM | DIASTOLIC BLOOD PRESSURE: 48 MMHG | SYSTOLIC BLOOD PRESSURE: 161 MMHG | HEART RATE: 87 BPM | WEIGHT: 179.2 LBS

## 2023-10-09 LAB
ANION GAP SERPL CALCULATED.3IONS-SCNC: 6 MMOL/L (ref 5–15)
BUN SERPL-MCNC: 10 MG/DL (ref 8–23)
BUN/CREAT SERPL: 19.2 (ref 7–25)
CALCIUM SPEC-SCNC: 8.6 MG/DL (ref 8.6–10.5)
CHLORIDE SERPL-SCNC: 100 MMOL/L (ref 98–107)
CO2 SERPL-SCNC: 31 MMOL/L (ref 22–29)
CREAT SERPL-MCNC: 0.52 MG/DL (ref 0.57–1)
EGFRCR SERPLBLD CKD-EPI 2021: 98.9 ML/MIN/1.73
GLUCOSE BLDC GLUCOMTR-MCNC: 119 MG/DL (ref 70–130)
GLUCOSE SERPL-MCNC: 131 MG/DL (ref 65–99)
POTASSIUM SERPL-SCNC: 4.1 MMOL/L (ref 3.5–5.2)
SODIUM SERPL-SCNC: 137 MMOL/L (ref 136–145)

## 2023-10-09 PROCEDURE — 25010000002 ENOXAPARIN PER 10 MG: Performed by: NURSE PRACTITIONER

## 2023-10-09 PROCEDURE — 80048 BASIC METABOLIC PNL TOTAL CA: CPT | Performed by: NURSE PRACTITIONER

## 2023-10-09 PROCEDURE — 82948 REAGENT STRIP/BLOOD GLUCOSE: CPT

## 2023-10-09 PROCEDURE — 99232 SBSQ HOSP IP/OBS MODERATE 35: CPT | Performed by: PHYSICIAN ASSISTANT

## 2023-10-09 RX ORDER — AMLODIPINE BESYLATE 2.5 MG/1
2.5 TABLET ORAL
Qty: 30 TABLET | Refills: 2 | Status: SHIPPED | OUTPATIENT
Start: 2023-10-09 | End: 2024-01-07

## 2023-10-09 RX ORDER — AMLODIPINE BESYLATE 2.5 MG/1
2.5 TABLET ORAL
Status: DISCONTINUED | OUTPATIENT
Start: 2023-10-09 | End: 2023-10-09 | Stop reason: HOSPADM

## 2023-10-09 RX ORDER — HYDROCODONE BITARTRATE AND ACETAMINOPHEN 5; 325 MG/1; MG/1
1 TABLET ORAL EVERY 4 HOURS PRN
Qty: 42 TABLET | Refills: 0 | Status: SHIPPED | OUTPATIENT
Start: 2023-10-09 | End: 2023-10-16

## 2023-10-09 RX ORDER — METOPROLOL TARTRATE 50 MG/1
50 TABLET, FILM COATED ORAL EVERY 12 HOURS SCHEDULED
Qty: 60 TABLET | Refills: 2 | Status: SHIPPED | OUTPATIENT
Start: 2023-10-09 | End: 2024-01-07

## 2023-10-09 RX ORDER — AMLODIPINE BESYLATE 5 MG/1
5 TABLET ORAL
Status: DISCONTINUED | OUTPATIENT
Start: 2023-10-09 | End: 2023-10-09

## 2023-10-09 RX ADMIN — ASPIRIN 81 MG: 81 TABLET, COATED ORAL at 08:39

## 2023-10-09 RX ADMIN — ENOXAPARIN SODIUM 40 MG: 100 INJECTION SUBCUTANEOUS at 08:39

## 2023-10-09 RX ADMIN — GUAIFENESIN 1200 MG: 600 TABLET, EXTENDED RELEASE ORAL at 08:39

## 2023-10-09 RX ADMIN — MUPIROCIN 1 APPLICATION: 20 OINTMENT TOPICAL at 08:39

## 2023-10-09 RX ADMIN — METOPROLOL TARTRATE 50 MG: 50 TABLET, FILM COATED ORAL at 08:39

## 2023-10-09 RX ADMIN — 0.12% CHLORHEXIDINE GLUCONATE 15 ML: 1.2 RINSE ORAL at 06:59

## 2023-10-09 RX ADMIN — PANTOPRAZOLE SODIUM 40 MG: 40 TABLET, DELAYED RELEASE ORAL at 06:59

## 2023-10-09 RX ADMIN — HYDROCODONE BITARTRATE AND ACETAMINOPHEN 2 TABLET: 5; 325 TABLET ORAL at 11:53

## 2023-10-09 NOTE — TELEPHONE ENCOUNTER
I tried to call Melina Martin but there was no answer.  Left a voicemail asking patient to call back.  Will continue to try to reach pt.    Thank you,    Damaris MARCANO, RN  Triage [facility]  10/09/23 09:03 EDT

## 2023-10-09 NOTE — CASE MANAGEMENT/SOCIAL WORK
Case Management Discharge Note      Final Note: Pt discharged to daughter/Hawa's home in [place], [place].  Jose A  will follow.  [employer] will provide nocturnal oxygen.............Peyton LEE/ORION HERRERA    Provided Post Acute Provider Quality & Resource List?: N/A    Selected Continued Care - Admitted Since 10/4/2023       Destination    No services have been selected for the patient.                Durable Medical Equipment Coordination complete.      Service Provider Selected Services Address Phone Fax Patient Preferred    [facility] [facility] [address] [facility] [id number] [phone] --              Dialysis/Infusion    No services have been selected for the patient.                Home Medical Care Coordination complete.      Service Provider Selected Services Address Phone Fax Patient Preferred    [facility] [address], [address], [facility] [address] [phone] --              Therapy    No services have been selected for the patient.                Community Resources    No services have been selected for the patient.                Community & DME    No services have been selected for the patient.                         Final Discharge Disposition Code: 06 - home with home health care

## 2023-10-09 NOTE — PLAN OF CARE
Goal Outcome Evaluation:  Plan of Care Reviewed With: patient        Progress: improving  Outcome Evaluation: Pt s/p SAVR/CHELA, POD 5. Surgical incisions SHEREE. No complaints of pain, ambulating to bathroom with staff assistance. Currently resting. No acute complaints. Will continue to monitor

## 2023-10-09 NOTE — CASE MANAGEMENT/SOCIAL WORK
Discharge Planning Assessment  [facility]     Patient Name: Melina Martin  MRN: [id number]  Today's Date: 10/9/2023    Admit Date: 10/4/2023    Plan: To daughter's home;  HH referral pending.   Discharge Needs Assessment       Row Name 10/09/23 1047       Living Environment    People in Home alone    Current Living Arrangements home    Potentially Unsafe Housing Conditions none    Primary Care Provided by self    Provides Primary Care For pet(s)    Family Caregiver if Needed child(evan), adult    Family Caregiver Names Hawa Martin and Lluvia Martin    Quality of Family Relationships helpful;involved;supportive    Able to Return to Prior Arrangements yes       Resource/Environmental Concerns    Resource/Environmental Concerns none    Transportation Concerns none       Food Insecurity    Within the past 12 months, you worried that your food would run out before you got the money to buy more. Never true    Within the past 12 months, the food you bought just didn't last and you didn't have money to get more. Never true       Transition Planning    Patient/Family Anticipates Transition to family members' home    Patient/Family Anticipated Services at Transition home health care    Transportation Anticipated family or friend will provide       Discharge Needs Assessment    Readmission Within the Last 30 Days no previous admission in last 30 days    Equipment Currently Used at Home bp cuff;scales    Concerns to be Addressed discharge planning    Anticipated Changes Related to Illness none    Equipment Needed After Discharge none    Discharge Facility/Level of Care Needs home with home health    Provided Post Acute Provider Quality & Resource List? N/A    Patient's Choice of Community Agency(s) Pt has no preference of HH.  Referrals sent.                   Discharge Plan       Row Name 10/09/23 1052       Plan    Plan To daughter's home;  HH referral pending.    Plan Comments CCP spoke with Pt at bedside.  CCP role  explained and discharge planning discussed.  Face sheet verified.  Pt stated she is IADL's, retired and drives.  Pt lives alone.  Pt reports PCP is, CARLOS Garcia.  Pt confirmed pharmacy is, [facility].  Pt denies use of past home health or going to a sub-acute rehab.  Pt has the following DME- BP cuff and scale.  Pt plans to go to her daughter/Hawa Martin's home at discharge ([address] [place] [address]).  Pt has no preference of home health agency.  CCP sent referral to [facility]  (pending).  CCP will continue to follow...Peyton LEE /.                  Continued Care and Services - Admitted Since 10/4/2023       Home Medical Care       Service Provider Request Status Selected Services Address Phone Fax Patient Preferred    [facility] Pending - Request Sent N/A [id number] [place] [id number] [phone] --                  Expected Discharge Date and Time       Expected Discharge Date Expected Discharge Time    Oct 8, 2023            Demographic Summary       Row Name 10/09/23 1046       General Information    Admission Type inpatient    Arrived From home    Required Notices Provided Important Message from Medicare    Referral Source admission list    Reason for Consult discharge planning    Preferred Language English                   Functional Status       Row Name 10/09/23 1047       Functional Status    Usual Activity Tolerance good    Current Activity Tolerance moderate       Assessment of Health Literacy    Health Literacy Good       Functional Status, IADL    Medications independent    Meal Preparation independent    Housekeeping independent    Laundry independent    Shopping independent       Mental Status    General Appearance WDL WDL       Mental Status Summary    Recent Changes in Mental Status/Cognitive Functioning no changes       Employment/    Employment Status retired                    Psychosocial    No documentation.                  Abuse/Neglect    No documentation.                  Legal    No documentation.                  Substance Abuse    No documentation.                  Patient Forms    No documentation.                     Peyton Lanier RN

## 2023-10-09 NOTE — DISCHARGE SUMMARY
Date of Admission: 10/4/2023  Date of Discharge:  10/9/2023    Discharge Diagnosis:   -Severe aortic stenosis- s/p mini sternotomy AVR (tissue), annular rook enlargement with pericardial patch and the modified nicks procedure  -Hypertension  -Obesity   -TCP- consumptive    Presenting Problem/History of Present Illness  Nonrheumatic aortic valve stenosis [I35.0]  Aortic stenosis [I35.0]     Hospital Course  Patient is a 72 y.o. female presented for previously scheduled cardiac surgery. On 10/4/23 she underwent aortic valve replacement with mini J sternotomy and root enlargement with pericardial patch with Dr. Boss (see op note for full report). Post operatively she did well. She was extubated on day of surgery, weaned from pressors, and transferred to stepdown on POD#1. She remains in SR throughout her hospital stay. Chest tubes, lucas, central line, and epicardial wires were removed without issue, in the usual fashion. She was weaned from oxygen during the day. Overnight oximetry revealed 37 minutes of desaturation below 89%. She will need nocturnal oxygen at discharge. She has met PT goals and is tolerating the current medication regimen. She is eating and drinking sufficiently, and urinating and defecating without issue. On POD#5 she was deemed ready for discharge home with home health. She is to follow up with her PCP in 1 week,  Dr. Jorgensen's APRN in 1-2 weeks, and in our office on 11/14/23 at 1 pm.     Procedures Performed  Procedure(s):  CHELA; AORTIC VALVE REPAIR/REPLACEMENT with MINI J STERNOTOMY; ANWALL ROOT ENLARGEMENT; TRANSESOPHAGEAL ECHOCARDIOGRAM WITH ANESTHESIA       Consults:   Consults       Date and Time Order Name Status Description    10/4/2023  5:17 PM Inpatient Cardiology Consult              Pertinent Test Results:    Lab Results   Component Value Date    WBC 7.46 10/08/2023    HGB 10.4 (L) 10/08/2023    HCT 32.1 (L) 10/08/2023    MCV 83.8 10/08/2023     10/08/2023      Lab Results    Component Value Date    GLUCOSE 131 (H) 10/09/2023    CALCIUM 8.6 10/09/2023     10/09/2023    K 4.1 10/09/2023    CO2 31.0 (H) 10/09/2023     10/09/2023    BUN 10 10/09/2023    CREATININE 0.52 (L) 10/09/2023    EGFRIFNONA 81 05/20/2019    BCR 19.2 10/09/2023    ANIONGAP 6.0 10/09/2023     Lab Results   Component Value Date    INR 1.21 (H) 10/05/2023    PROTIME 15.4 (H) 10/05/2023         Condition on Discharge:  Stable    Vital Signs  Temp:  [98 øF (36.7 øC)-99 øF (37.2 øC)] 98.4 øF (36.9 øC)  Heart Rate:  [73-87] 87  Resp:  [18] 18  BP: (133-161)/(48-64) 161/48      [place] [facility]    [place] Medications     Discharge Medications        New Medications        Instructions Start Date   amLODIPine 2.5 MG tablet  Commonly known as: NORVASC   2.5 mg, Oral, Every 24 Hours Scheduled      HYDROcodone-acetaminophen 5-325 MG per tablet  Commonly known as: NORCO   1 tablet, Oral, Every 4 Hours PRN      metoprolol tartrate 50 MG tablet  Commonly known as: LOPRESSOR   50 mg, Oral, Every 12 Hours Scheduled             Continue These Medications        Instructions Start Date   anastrozole 1 MG tablet  Commonly known as: ARIMIDEX   1 mg, Oral, Daily      APPLE CIDER VINEGAR PO   1 tablet, Oral, Daily, PT HOLDING FOR SURGERY       ASPIRIN 81 PO   81 mg, Oral, Daily      CALCIUM 500 + D PO   1 tablet, Oral, Daily      CO Q 10 PO   1 capsule, Oral, Daily, PT HOLDING FOR SURGERY       Melatonin 10 MG tablet   Take 1 tablet by mouth Every Night.      OSTEO BI-FLEX REGULAR STRENGTH PO   1 tablet, Oral, Daily, PT HOLDING FOR SURGERY       rosuvastatin 40 MG tablet  Commonly known as: CRESTOR   1 tablet, Oral, Nightly             Stop These Medications      carvedilol 6.25 MG tablet  Commonly known as: COREG     chlorhexidine 0.12 % solution  Commonly known as: PERIDEX     Chlorhexidine Gluconate 2 % pads     diphenhydrAMINE 25 mg capsule  Commonly known as: BENADRYL     mupirocin 2 % nasal  ointment  Commonly known as: BACTROBAN              Discharge Diet:   Diet Instructions    Healthy heart diet           Activity at Discharge:   Activity Instructions    Activity as tolerated           Follow-up Appointments  Future Appointments   Date Time Provider Department Center   11/14/2023  1:00 PM Kelly Reyes APRN [facility]   12/21/2023  1:00 PM Ila Sharma MD PhD [facility] E[facility]   1/17/2024  2:00 PM SHERRI JONNY ANGELICA 2 BH [facility] ETWMM [facility]   1/22/2024  9:00 AM NURSE/MA GASTRO [place] WOOD [facility] [place] ETW [facility]     Additional Instructions for the Follow-ups that You Need to Schedule       Ambulatory Referral to Cardiac Rehab   As directed      Ambulatory Referral to Home Health   As directed      Face to Face Visit Date: 10/8/2023   Follow-up provider for Plan of Care?: I will be treating the patient on an ongoing basis.  Please send me the Plan of Care for signature.   Follow-up provider: MEI CARUSO [[id number]]   Reason/Clinical Findings: post op open heart   Describe mobility limitations that make leaving home difficult: weakness   Nursing/Therapeutic Services Requested: Skilled Nursing   Skilled nursing orders: Post CABG care   Frequency: 1 Week 1        Call MD With Problems / Concerns   As directed      Instructions:  Call office at [phone] for any drainage, increased redness, or fever over 100.5    Order Comments: Instructions:  Call office at [phone] for any drainage, increased redness, or fever over 100.5         Discharge Follow-up with PCP   As directed       Currently Documented PCP:    Maria Ines Nguyen APRN    PCP Phone Number:    [phone]     Follow Up Details: in 1 week        Discharge Follow-up with Specialty: Cardiologist CARLOS/PA; 1 Week   As directed      Specialty: Cardiologist CARLOS/PA   Follow Up: 1 Week   Follow Up Details: bring all prescription bottles to appointment, call for appointment        Discharge Follow-up with Specified Provider: CARLOS De La Rosa  11/14/23 at 1pm   As directed      To: CARLOS De La Rosa 11/14/23 at 1pm   Follow Up Details: bring all current medications to appointment        Discharge Follow-up with Specified Provider: Cardiologist; 1 Month   As directed      To: Cardiologist   Follow Up: 1 Month   Follow Up Details: call for appointment, bring all medication bottles to appointment                Test Results Pending at Discharge       CARLOS Mora  10/09/23  13:17 EDT

## 2023-10-09 NOTE — CASE MANAGEMENT/SOCIAL WORK
Continued Stay Note  [facility]     Patient Name: Melina Martin  MRN: [id number]  Today's Date: 10/9/2023    Admit Date: 10/4/2023    Plan: To daughter's home;  [employer] will provide nocturnal oxygen.  HH eval pending   Discharge Plan       Row Name 10/09/23 1121       Plan    Plan To daughter's home;  [employer] will provide nocturnal oxygen.  HH eval pending    Plan Comments CCP spoke with Pt via phone to get her DME oxygen provider choice.  Pt reports she has no preference.  CCP called referral to [facility] and she accepted.  [facility] is going to call the patient.  CCP informed Pt that CcP will call her on her mobile phone as soon as an accepting HH agency is secured.  Referral pending for HH............Peyton LEE/[facility]      Row Name 10/09/23 1052       Plan    Plan To daughter's home;  HH referral pending.    Plan Comments CCP spoke with Pt at bedside.  CCP role explained and discharge planning discussed.  Face sheet verified.  Pt stated she is IADL's, retired and drives.  Pt lives alone.  Pt reports PCP is, CARLOS Garcia.  Pt confirmed pharmacy is, [facility].  Pt denies use of past home health or going to a sub-acute rehab.  Pt has the following DME- BP cuff and scale.  Pt plans to go to her daughter/Hawa Martin's home at discharge ([address] [place] [id number]).  Pt has no preference of home health agency.  CCP sent referral to Rachel[facility]  (pending).  CCP will continue to follow...Peyton LEE /.                   Discharge Codes    No documentation.                 Expected Discharge Date and Time       Expected Discharge Date Expected Discharge Time    Oct 8, 2023               Peyton Lanier RN

## 2023-10-09 NOTE — TELEPHONE ENCOUNTER
----- Message from Kings Fitzgerald PA-C sent at 10/9/2023  8:52 AM EDT -----  Regarding: d/c  Anticipated discharge from hospital day after aortic valve replacement.  Please schedule follow-up in 1 to 2 weeks.  Her physician was Dr. Jorgensen.

## 2023-10-09 NOTE — CONSULTS
Met with patient, discussed benefits of cardiac rehab. Provided phase II information along with the contact information for cardiac rehab at [facility]. Requested for referral to be sent.Explained if receiving home health would not be able to attend cardiac rehab until finished with home health.    no

## 2023-10-09 NOTE — DISCHARGE PLACEMENT REQUEST
"Adin Martin (72 y.o. Female)       Date of Birth   1950    Social Security Number       Address   [address] [place] [address]    Home Phone   [phone]    MRN   [id number]       Synagogue   None    Marital Status                               Admission Date   10/4/23    Admission Type   Elective    Admitting Provider   Wali Boss MD    Attending Provider   Wali Boss MD    Department, Room/Bed   [facility] CVI, 3116/1       Discharge Date       Discharge Disposition   Home-Health Care Svc    Discharge Destination                                 Attending Provider: Wali Boss MD    Allergies: No Known Allergies    Isolation: None   Infection: None   Code Status: CPR    Ht: 162.6 cm (64\")   Wt: 81.3 kg (179 lb 3.2 oz)    Admission Cmt: None   Principal Problem: Nonrheumatic aortic valve stenosis [I35.0]                   Active Insurance as of 10/4/2023       Primary Coverage       Payor Plan Insurance Group Employer/Plan Group    [facility] MEDICARE REPLACEMENT [facility] MEDICARE REPLACEMENT [id number]       Payor Plan Address Payor Plan Phone Number Payor Plan Fax Number Effective Dates    [address] [address]   1/1/2019 - None Entered    [facility] [id number]         Subscriber Name Subscriber Birth Date Member ID       ADIN MARTIN 1950 [id number]                     Emergency Contacts        (Rel.) Home Phone Work Phone Mobile Phone    MARIA E MARTIN (Daughter) [phone] -- [phone]    SHELLY MARTIN (Daughter) [phone] -- [phone]                "

## 2023-10-09 NOTE — PROGRESS NOTES
Patient Name: Melina Martin  Age/Sex: 72 y.o. female  : 1950  MRN: [id number]    Date of Admission: 10/4/2023  Date of Encounter Visit: 10/09/23  Encounter Provider: Kings Arnett PA-C  Place of Service: [facility] CARDIOLOGY      Subjective:       Chief Complaint: Aortic valve stenosis     History of Present Illness:  Melina Martin is a 72 y.o. female she had a SAVR we had minithoracotomy and tissue valve prosthesis on 10/4/2023 with Dr. Boss.  Prior to surgery she been having worsening shortness of breath and dyspnea on exertion while walking her dog.    PMH: Aortic valve stenosis, left-sided breast cancer s/p lumpectomy, hypertension, hyperlipidemia, mild obesity    Follow up:   10/09/23 Postop day #5.  Blood pressure is elevated this morning prior to a.m. meds.  Patient remains on room air.  She has been up and walking in the unit with help of nurses.  Plan is for discharge to rehab facility today.     Previous testing:   Echo 2023: Severe aortic valve stenosis with calcification.  Peak velocity 4.4M/S with aortic valve area of 0.16 cm..  Mean pressure reading 44 mmHg.    Heart cath 2023: LM patent, LAD patent, circumflex patent, RCA 30%    Home Medications:   Medications Prior to Admission   Medication Sig Dispense Refill Last Dose    anastrozole (ARIMIDEX) 1 MG tablet Take 1 tablet by mouth Daily. 90 tablet 1 10/3/2023 at 0800    APPLE CIDER VINEGAR PO Take 1 tablet by mouth Daily. PT HOLDING FOR SURGERY   Past Week    ASPIRIN 81 PO Take 81 mg by mouth Daily.   Past Week    Calcium Carb-Cholecalciferol (CALCIUM 500 + D PO) Take 1 tablet by mouth Daily.   Past Week    carvedilol (COREG) 6.25 MG tablet Take 1 tablet by mouth 2 (Two) Times a Day With Meals.   10/3/2023 at 1900    chlorhexidine (PERIDEX) 0.12 % solution Apply 15 mL to the mouth or throat. As directed pre op   10/4/2023 at 0430    Chlorhexidine Gluconate 2 % pads Apply  topically. As  directed pre op   10/4/2023 at 0430    Coenzyme Q10 (CO Q 10 PO) Take 1 capsule by mouth Daily. PT HOLDING FOR SURGERY   Past Week    diphenhydrAMINE (BENADRYL) 25 mg capsule Take 12.5 mg by mouth Every Night.   10/3/2023 at 2100    Glucosamine-Chondroitin (OSTEO BI-FLEX REGULAR STRENGTH PO) Take 1 tablet by mouth Daily. PT HOLDING FOR SURGERY   Past Week    Melatonin 10 MG tablet Take 1 tablet by mouth Every Night.   10/3/2023 at 2100    mupirocin (BACTROBAN) 2 % nasal ointment into the nostril(s) as directed by provider. As directed pre op   10/4/2023 at 0430    rosuvastatin (CRESTOR) 40 MG tablet Take 1 tablet by mouth Every Night.   10/3/2023 at 1900       Allergies:  No Known Allergies      Review of Systems:  All systems reviewed. Pertinent positives identified in HPI. All other systems are negative.       Objective:     Objective:  Temp:  [98 øF (36.7 øC)-99 øF (37.2 øC)] 98.4 øF (36.9 øC)  Heart Rate:  [73-87] 87  Resp:  [18] 18  BP: (133-161)/(48-64) 161/48    Intake/Output Summary (Last 24 hours) at 10/9/2023 0818  Last data filed at 10/9/2023 0753  Gross per 24 hour   Intake 680 ml   Output --   Net 680 ml     Body mass index is 30.76 kg/mý.      10/07/23  0300 10/08/23  0558 10/09/23  0645   Weight: 82.2 kg (181 lb 5.3 oz) 81.6 kg (179 lb 12.8 oz) 81.3 kg (179 lb 3.2 oz)         Physical Exam:   General: 72 y.o. female No acute distress, laying in bed. Alert and Oriented.   Neck: No JVD or carotid bruit  Lungs: Clear to ausculation bilaterally, symmetric  Heart: Regular rate and rhythm with no overt murmurs, rubs or gallops. Normal S1 and S2.   Abdomen: soft, non-tender  Extremities: No lower extremity edema or cyanosis.   Neuo: no lateralizing defects.     Lab Review:     Results from last 7 days   Lab Units 10/09/23  0345 10/08/23  0257 10/07/23  0244   SODIUM mmol/L 137 136 138   POTASSIUM mmol/L 4.1 4.2 4.4   CHLORIDE mmol/L 100 96* 97*   CO2 mmol/L 31.0* 32.4* 33.0*   BUN mg/dL 10 12 15   CREATININE  mg/dL 0.52* 0.58 0.74   GLUCOSE mg/dL 131* 124* 142*   CALCIUM mg/dL 8.6 8.6 8.8           Results from last 7 days   Lab Units 10/08/23  0257   WBC 10*3/mm3 7.46   HEMOGLOBIN g/dL 10.4*   HEMATOCRIT % 32.1*   PLATELETS 10*3/mm3 159     Results from last 7 days   Lab Units 10/05/23  0252 10/04/23  1731   INR  1.21* 1.34*   APTT seconds  --  30.1         Results from last 7 days   Lab Units 10/04/23  2047   MAGNESIUM mg/dL 3.0*                       Imaging:    chest X-ray    Results for orders placed in visit on 10/04/23    Diagnostic IntraOp Steve    Narrative  Diagnostic IntraOp Steve    Procedure Performed: Diagnostic IntraOp Steve  Start Time:  End Time:    Preanesthesia Checklist:  Patient identified, IV assessed, risks and benefits discussed, monitors and equipment assessed, procedure being performed at surgeon's request and anesthesia consent obtained.    General Procedure Information  Diagnostic Indications for Echo:  assessment of ascending aorta, assessment of surgical repair and hemodynamic monitoring  Physician Requesting Echo: Wali Boss MD  CPT Code:  42927, 31328  ICD Code for Medical Necessity:  I70.0  Location performed:  OR  Intubated  Bite block placed  Heart visualized  Probe Insertion:  Easy  Probe Type:  Multiplane  Modalities:  Color flow mapping, pulse wave Doppler and continuous wave Doppler    Echocardiographic and Doppler Measurements    Ventricles    Right Ventricle:  Cavity size normal.  Thrombus not present.  Left Ventricle:  Cavity size normal.  Thrombus not present.  Global Function normal.  Ejection Fraction 62%.        Valves    Aortic Valve:  Annulus normal.  Stenosis severe.  Area: .45 cmý.  Mean Gradient: 33 mmHg.  Regurgitation moderate.  Leaflets calcified and thickened.  Leaflet motions restricted.  Specific leaflet segments with abnormal motions are described in the following comments:  LCC, RCC    Mitral Valve:  Annulus normal.  Stenosis not present.  Mean Gradient: 1 mmHg.   Regurgitation mild.  Leaflets normal.  Leaflet motions normal.    Tricuspid Valve:  Annulus normal.  Stenosis not present.  Regurgitation trace.  Leaflets normal.  Leaflet motions normal.  Pulmonic Valve:  Annulus normal.  Stenosis not present.  Regurgitation absent.      Aorta    Ascending Aorta:  Size normal.  Diameter 2.8 cm.  Dissection not present.  Plaque thickness less than 3 mm.  Mobile plaque not present.  Aortic Arch:  Size normal.  Diameter 1.9 cm.  Dissection not present.  Plaque thickness less than 3 mm.  Mobile plaque not present.  Descending Aorta:  Diameter 1.7 cm.  Dissection not present.  Plaque thickness less than 3 mm.  Mobile plaque not present.      Atria    Right Atrium:  Size normal.  Spontaneous echo contrast not present.  Thrombus not present.  Tumor not present.  Device not present.    Left Atrium:  Size normal.  Spontaneous echo contrast not present.  Thrombus not present.  Tumor not present.  Device not present.  Left atrial appendage normal.          Diastolic Function Measurements:  Diastolic Dysfunction Grade= I  E=  29.9 ms  A=  40.1 ms  E/A Ratio=  .7  DT=  199 ms  S/D=  1.6  IVRT=    Other Findings  Pericardium:  normal  Pleural Effusion:  none  Pulmonary Venous Flow:  normal    Anesthesia Information  Performed Personally  Anesthesiologist:  Joo Daniel MD      Echocardiogram Comments:  Postbypass results:  AV s/p new bioprosthetic valve mean gradient 14 mmHg no perivalvular leak no AI  MV mild MR no MS mean gradient 2 mmHg  TV mild TR no TS  LVEF 65% no RWMAS  Normal RHF      Telemetry/EKG:   10/09/23: SR with no new events        I personally viewed and interpreted the patient's EKG/Telemetry data.    Assessment/ Plan:       1.  Severe aortic valve stenosis s/p AVR with 21 mm Magna pericardial prosthesis and annular root enlargement 10/4/2023. POD #5.  On room air. She remains on Lovenox with plans to transition to Plavix on discharge.  Continue aspirin.    2.   Hypertension-currently on Lopressor 50 mg twice daily.  Blood pressure was elevated this morning prior to a.m. labs.  She will need to keep home blood pressure log.  She was previously on carvedilol prior to hospitalization but will continue Lopressor for now.  Uptitrate as appropriate.    3.  Hyperlipidemia -continue Lipitor 40 mg daily.    4.  History of prior breast cancer with lumpectomy    5.  Postoperative normocytic anemia-hemoglobin is 10.4 yesterday.  CTS following    She will need to follow-up in our office in 1 to 2 weeks after discharge.    Thank you for allowing me to participate in the care of Melina Martin. Feel free to contact me directly with any further questions or concerns.    Kings Arnett PA-C  [facility]  10/09/23  08:18 EDT

## 2023-10-10 LAB
BH BB BLOOD EXPIRATION DATE: NORMAL
BH BB BLOOD EXPIRATION DATE: NORMAL
BH BB BLOOD TYPE BARCODE: 5100
BH BB BLOOD TYPE BARCODE: 5100
BH BB DISPENSE STATUS: NORMAL
BH BB DISPENSE STATUS: NORMAL
BH BB PRODUCT CODE: NORMAL
BH BB PRODUCT CODE: NORMAL
BH BB UNIT NUMBER: NORMAL
BH BB UNIT NUMBER: NORMAL
CROSSMATCH INTERPRETATION: NORMAL
CROSSMATCH INTERPRETATION: NORMAL
UNIT  ABO: NORMAL
UNIT  ABO: NORMAL
UNIT  RH: NORMAL
UNIT  RH: NORMAL

## 2023-10-10 NOTE — OUTREACH NOTE
Prep Survey      Flowsheet Row Responses   [facility] facility patient discharged from? [place]   Is LACE score < 7 ? No   Eligibility Readm Mgmt   Discharge diagnosis CHELA aortic valve repair with mini sternotomy   Does the patient have one of the following disease processes/diagnoses(primary or secondary)? Cardiothoracic surgery   Does the patient have Home health ordered? Yes   What is the Home health agency?  [facility]    Is there a DME ordered? Yes   What DME was ordered? [employer] for DME   Prep survey completed? Yes            HOLLIE MAURICE - Registered Nurse          
WDL

## 2023-10-10 NOTE — TELEPHONE ENCOUNTER
I scheduled pt an appt to see CARLOS Vieyra on 10/24/23.    Thank you,    Damaris MARCANO RN  Triage [facility]  10/10/23 09:42 EDT

## 2023-10-13 ENCOUNTER — READMISSION MANAGEMENT (OUTPATIENT)
Dept: CALL CENTER | Facility: HOSPITAL | Age: 73
End: 2023-10-13
Payer: MEDICARE

## 2023-10-13 NOTE — OUTREACH NOTE
CT Surgery Week 1 Survey      Flowsheet Row Responses   [facility] patient discharged from? [place]   Does the patient have one of the following disease processes/diagnoses(primary or secondary)? Cardiothoracic surgery   Week 1 attempt successful? Yes   Call start time 1608   Call end time 1613   Discharge diagnosis CHELA aortic valve repair with mini sternotomy   Meds reviewed with patient/caregiver? Yes   Is the patient having any side effects they believe may be caused by any medication additions or changes? No   Does the patient have all medications related to this admission filled (includes all antibiotics, pain medications, cardiac medications, etc.) Yes   Is the patient taking all medications as directed (includes completed medication regime)? Yes   Does the patient have a primary care provider?  Yes   Does the patient have an appointment scheduled with their C/T surgeon? Yes   Has the patient kept scheduled appointments due by today? N/A   What is the Home health agency?  [facility]    Has home health visited the patient within 72 hours of discharge? Yes   What DME was ordered? [employer] for DME   Has all DME been delivered? Yes   Psychosocial issues? No   Did the patient receive a copy of their discharge instructions? Yes   Nursing interventions Reviewed instructions with patient   What is the patient's perception of their health status since discharge? Improving   Nursing interventions Nurse provided patient education   Is the patient/caregiver able to teach back normal signs of recovery? Pain or discomfort at incisional site   Nursing interventions Reassured on normal signs of recovery   Is the patient /caregiver able to teach back basic post-op care? Continue use of incentive spirometry at least 1 week post discharge, Practice cough and deep breath every 4 hours while awake, Hold pillow to support chest when coughing, Drive as instructed by MD in discharge instructions, Shower daily, No tub bath,  Francisco- Lamonte Abernathy for counseling intake today and gave a dx of MDD recurrent with anxious distress, and seasonal pattern. I will see her again in 1-2 wks.  Niki Davis MA, [facility] swimming, or hot tub until instructed by MD, Use a clean wash cloth and antibacterial bar or liquid soap to clean incisions, If the steri-strips are falling off, it is okay to remove them. (If applicable), Lifting as instructed by MD in discharge instructions   Is the patient/caregiver able to teach back signs and symptoms of incisional infection? Increased redness, swelling or pain at the incisonal site, Increased drainage or bleeding, Incisional warmth, Pus or odor from incision, Fever   Is the patient/caregiver able to teach back steps to recovery at home? Set small, achievable goals for return to baseline health, Rest and rebuild strength, gradually increase activity, Eat a well-balance diet   If the patient is a current smoker, are they able to teach back resources for cessation? Not a smoker   Is the patient/caregiver able to teach back the hierarchy of who to call/visit for symptoms/problems? PCP, Specialist, Home health nurse, Urgent Care, ED, 911 Yes   Week 1 call completed? Yes   Call end time 1613              Nandini Muro Registered Nurse

## 2023-10-19 ENCOUNTER — READMISSION MANAGEMENT (OUTPATIENT)
Dept: CALL CENTER | Facility: HOSPITAL | Age: 73
End: 2023-10-19
Payer: MEDICARE

## 2023-10-19 NOTE — OUTREACH NOTE
CT Surgery Week 2 Survey      Flowsheet Row Responses   [facility] patient discharged from? [place]   Does the patient have one of the following disease processes/diagnoses(primary or secondary)? Cardiothoracic surgery   Week 2 attempt successful? Yes   Call start time 1607   Call end time 1611   Discharge diagnosis CHELA aortic valve repair with mini sternotomy   Person spoke with today (if not patient) and relationship Patient   Meds reviewed with patient/caregiver? Yes   Does the patient have all medications related to this admission filled (includes all antibiotics, pain medications, cardiac medications, etc.) Yes   Is the patient taking all medications as directed (includes completed medication regime)? Yes   Comments regarding appointments Cardiology appt 10/24  1pm with NP   Does the patient have a primary care provider?  Yes   Has the patient kept scheduled appointments due by today? Yes   Comments CT surgery appt 11/14 1pm post op with NP   Psychosocial issues? No   Did the patient receive a copy of their discharge instructions? Yes   What is the patient's perception of their health status since discharge? Improving   Is the patient /caregiver able to teach back basic post-op care? Lifting as instructed by MD in discharge instructions, No tub bath, swimming, or hot tub until instructed by MD, Drive as instructed by MD in discharge instructions   Is the patient/caregiver able to teach back signs and symptoms of incisional infection? Increased redness, swelling or pain at the incisonal site, Increased drainage or bleeding, Fever   Is the patient/caregiver able to teach back steps to recovery at home? Set small, achievable goals for return to baseline health, Rest and rebuild strength, gradually increase activity, Eat a well-balance diet   If the patient is a current smoker, are they able to teach back resources for cessation? Not a smoker   Is the patient/caregiver able to teach back the hierarchy of  who to call/visit for symptoms/problems? PCP, Specialist, Home health nurse, Urgent Care, ED, 911 Yes   Week 2 call completed? Yes   Revoked No further contact(revokes)-requires comment   Is the patient interested in additional calls from an ambulatory ? No   Would this patient benefit from a Referral to [facility] Social Work? No   Graduated/Revoked comments Declines need for further follow up calls.   Call end time 1611            ANTONIA TAVERAS - Registered Nurse

## 2023-10-24 ENCOUNTER — OFFICE VISIT (OUTPATIENT)
Dept: CARDIOLOGY | Facility: CLINIC | Age: 73
End: 2023-10-24
Payer: MEDICARE

## 2023-10-24 VITALS
BODY MASS INDEX: 29.37 KG/M2 | WEIGHT: 172 LBS | DIASTOLIC BLOOD PRESSURE: 60 MMHG | HEART RATE: 81 BPM | SYSTOLIC BLOOD PRESSURE: 134 MMHG | OXYGEN SATURATION: 99 % | HEIGHT: 64 IN

## 2023-10-24 DIAGNOSIS — I51.7 LVH (LEFT VENTRICULAR HYPERTROPHY): ICD-10-CM

## 2023-10-24 DIAGNOSIS — R94.31 ABNORMAL EKG: ICD-10-CM

## 2023-10-24 DIAGNOSIS — I45.10 RBBB (RIGHT BUNDLE BRANCH BLOCK): ICD-10-CM

## 2023-10-24 DIAGNOSIS — I10 PRIMARY HYPERTENSION: ICD-10-CM

## 2023-10-24 DIAGNOSIS — R00.2 AWARENESS OF HEART BEAT: ICD-10-CM

## 2023-10-24 DIAGNOSIS — I35.0 NONRHEUMATIC AORTIC VALVE STENOSIS: Primary | ICD-10-CM

## 2023-10-24 DIAGNOSIS — G47.34 NOCTURNAL OXYGEN DESATURATION: ICD-10-CM

## 2023-10-24 RX ORDER — METOPROLOL TARTRATE 50 MG/1
50 TABLET, FILM COATED ORAL EVERY 12 HOURS SCHEDULED
Qty: 180 TABLET | Refills: 2 | Status: SHIPPED | OUTPATIENT
Start: 2023-10-24

## 2023-10-24 RX ORDER — AMLODIPINE BESYLATE 2.5 MG/1
2.5 TABLET ORAL
Qty: 90 TABLET | Refills: 2 | Status: SHIPPED | OUTPATIENT
Start: 2023-10-24

## 2023-10-24 RX ORDER — AMOXICILLIN 500 MG/1
2000 CAPSULE ORAL SEE ADMIN INSTRUCTIONS
Qty: 12 CAPSULE | Refills: 0 | Status: SHIPPED | OUTPATIENT
Start: 2023-10-24

## 2023-10-24 NOTE — PROGRESS NOTES
Date of Office Visit: 10/24/2023  Encounter Provider: CARLOS Holly  Place of Service: [facility] CARDIOLOGY  Patient Name: Melina Martin  :1950  Primary Cardiologist: Dr. Ortiz Jorgensen    Chief Complaint   Patient presents with    Hospital Follow Up Visit    Aortic Stenosis   :     HPI: Melina Martin is a 72 y.o. female who presents today for hospital follow-up visit.  She is a new patient to me and I reviewed her medical records.    She has been diagnosed with hypertension and hyperlipidemia.  In 2019, she underwent left-sided DCIS and lumpectomy followed by radiation therapy.  She has remained on anastrozole and did not require chemotherapy.    She has been told that she has had a heart murmur for many years.  She denies any history of congenital heart disease or rheumatic fever.  She previously saw a different cardiologist.  In 2023, echocardiogram showed EF normal, moderate concentric hypertrophy, grade 1 diastolic dysfunction, left atrium mildly dilated, severe aortic valve stenosis, and mild pulmonary hypertension.    In 2023, she saw Dr. Jorgensen for a second opinion about her aortic stenosis.  She is walking 2 to 3 miles daily involving hills.  She felt fine, but her daughter was concerned that she noticed increased shortness of breath.  Dr. Jorgensen suspected that her aortic stenosis was due to radiation therapy.  Treadmill stress test showed no evidence of ischemia and a hypertensive response to stress.  Her exercise capacity was mildly impaired.  She underwent cardiac catheterization which showed normal coronary arteries except for proximal RCA 30% stenosis and severe aortic stenosis.  Carotid duplex showed mild plaque/stenosis bilaterally.    On 10/4, she underwent aortic valve replacement with mini J sternotomy and root enlargement with pericardial patch by Dr. Wali Boss.  She did well postoperatively.  Overnight oximetry revealed 37 minutes of  desaturation below 89% and she was discharged on nocturnal oxygen.    She presents today for follow-up visit.  We reviewed the hospital records.  She is at home now doing well.  She is more aware of her heartbeat, but it does not feel fast or irregular.  Yesterday, she walked up a hill and felt dyspneic with exertion.  She denies chest pain, shortness of air, PND, orthopnea, edema, dizziness, syncope, or bleeding.  She denies any incisional discomfort.  Her blood pressure and heart rate are both normal.  Sternum is healing well.  Her blood count at her PCP office the other day showed a hemoglobin of 12.9 and elevated platelets of 526.  She was recommended to have a repeat CBC in a month at her PCP office    Past Medical History:   Diagnosis Date    Anxiety     Aortic stenosis     Cataract     DCIS (ductal carcinoma in situ)     2019: left sided, lumpectomy/XRT/anastrozole    GERD (gastroesophageal reflux disease)     Heart murmur     History of chronic sinusitis     HTN (hypertension)     Hyperlipidemia        Past Surgical History:   Procedure Laterality Date    AORTIC VALVE REPAIR/REPLACEMENT N/A 10/4/2023    Procedure: CHELA; AORTIC VALVE REPAIR/REPLACEMENT with MINI J STERNOTOMY; ANWALL ROOT ENLARGEMENT; TRANSESOPHAGEAL ECHOCARDIOGRAM WITH ANESTHESIA;  Surgeon: Wali Boss MD;  [facility];  Service: Cardiothoracic;  Laterality: N/A;    BREAST BIOPSY Left     MALIGNANT(LOW GRADE DUCTAL CARCINOMA)    BREAST LUMPECTOMY Left 06/13/2019    Procedure: Left breast needle localized lumpectomy,;  Surgeon: Beatriz Fuentes MD;  Location: [facility];  Service: General    CARDIAC CATHETERIZATION N/A 08/24/2023    Procedure: Coronary angiography;  Surgeon: Bakari Newman MD;  Location: [facility] CATH INVASIVE LOCATION;  Service: Cardiology;  Laterality: N/A;    CARDIAC CATHETERIZATION N/A 08/24/2023    Procedure: Left heart cath;  Surgeon: Bakari Newman MD;  Location: [facility] CATH INVASIVE LOCATION;   Service: Cardiology;  Laterality: N/A;    CARDIAC CATHETERIZATION  2023    CATARACT EXTRACTION, BILATERAL       SECTION      X 1    COLONOSCOPY      HAND SURGERY Right     OVARIAN CYST REMOVAL         Social History     Socioeconomic History    Marital status:     Number of children: 2   Tobacco Use    Smoking status: Former     Packs/day: 0.25     Years: 5.00     Additional pack years: 0.00     Total pack years: 1.25     Types: Cigarettes     Quit date: 1982     Years since quittin.8     Passive exposure: Past    Smokeless tobacco: Never    Tobacco comments:     Social smoker before    Vaping Use    Vaping Use: Never used   Substance and Sexual Activity    Alcohol use: Yes     Comment: 1-2 DRINKS MONTHLY     Drug use: No    Sexual activity: Not Currently     Partners: Male     Birth control/protection: Post-menopausal       Family History   Problem Relation Age of Onset    Stroke Father     Hearing loss Father     Heart attack Father     Colon cancer Mother 89    Hearing loss Mother     Hypertension Mother     Diabetes Brother     Heart attack Brother     Depression Daughter     Breast cancer Maternal Aunt     Ovarian cancer Cousin     Cancer Cousin         FEMALE CANCER OF UNKNOWN ORGIN 60S    Malig Hyperthermia Neg Hx     Deep vein thrombosis Neg Hx     Pulmonary embolism Neg Hx     Uterine cancer Neg Hx        The following portion of the patient's history were reviewed and updated as appropriate: past medical history, past surgical history, past social history, past family history, allergies, current medications, and problem list.    Review of Systems   Constitutional: Negative.   Cardiovascular:  Positive for dyspnea on exertion and palpitations.   Respiratory: Negative.     Hematologic/Lymphatic: Negative.    Neurological: Negative.        No Known Allergies      Current Outpatient Medications:     amLODIPine (NORVASC) 2.5 MG tablet, Take 1 tablet by mouth Daily., Disp: 90  "tablet, Rfl: 2    anastrozole (ARIMIDEX) 1 MG tablet, Take 1 tablet by mouth Daily., Disp: 90 tablet, Rfl: 1    APPLE CIDER VINEGAR PO, Take 1 tablet by mouth Daily. PT HOLDING FOR SURGERY, Disp: , Rfl:     ASPIRIN 81 PO, Take 81 mg by mouth Daily., Disp: , Rfl:     Calcium Carb-Cholecalciferol (CALCIUM 500 + D PO), Take 1 tablet by mouth Daily., Disp: , Rfl:     Coenzyme Q10 (CO Q 10 PO), Take 1 capsule by mouth Daily. PT HOLDING FOR SURGERY, Disp: , Rfl:     Glucosamine-Chondroitin (OSTEO BI-FLEX REGULAR STRENGTH PO), Take 1 tablet by mouth Daily. PT HOLDING FOR SURGERY, Disp: , Rfl:     Melatonin 10 MG tablet, Take 1 tablet by mouth Every Night., Disp: , Rfl:     metoprolol tartrate (LOPRESSOR) 50 MG tablet, Take 1 tablet by mouth Every 12 (Twelve) Hours., Disp: 180 tablet, Rfl: 2    rosuvastatin (CRESTOR) 40 MG tablet, Take 1 tablet by mouth Every Night., Disp: , Rfl:     amoxicillin (AMOXIL) 500 MG capsule, Take 4 capsules by mouth See Admin Instructions. 4 capsules 1 hour prior to procedure, Disp: 12 capsule, Rfl: 0         Objective:     Vitals:    10/24/23 1235   BP: 134/60   BP Location: Left arm   Patient Position: Sitting   Pulse: 81   SpO2: 99%   Weight: 78 kg (172 lb)   Height: 162.6 cm (64\")     Body mass index is 29.52 kg/m².    PHYSICAL EXAM:    Vitals Reviewed.   General Appearance: No acute distress, well developed and well nourished.    HENT: No hearing loss noted.    Respiratory: No signs of respiratory distress. Respiration rhythm and depth normal.  Clear to auscultation.   Cardiovascular:  Jugular Venous Pressure: Normal  Heart Rate and Rhythm: Normal, Heart Sounds: Normal S1 and S2. No S3 or S4 noted.  Murmurs: No murmurs noted. No rubs, thrills, or gallops.   Lower Extremities: No edema noted.  Chest incision healing well.  Clean dry and intact.  Musculoskeletal: Normal movement of extremities.  Skin: General appearance normal.    Psychiatric: Patient alert and oriented to person, place, and " time. Speech and behavior appropriate. Normal mood and affect.       ECG 12 Lead    Date/Time: 10/24/2023 12:34 PM  Performed by: aRchel Tavarez APRN    Authorized by: Rachel Tavarez APRN  Comparison: compared with previous ECG from 10/6/2023  Similar to previous ECG  Rhythm: sinus rhythm  Rate: normal  BPM: 81  Conduction: right bundle branch block  ST Segments: ST segments normal  T inversion: V1, V2, V4, V5, V6, I and aVL  QRS axis: normal  Other findings: T wave abnormality and left ventricular hypertrophy    Clinical impression: abnormal EKG            Assessment:       Diagnosis Plan   1. Nonrheumatic aortic valve stenosis        2. LVH (left ventricular hypertrophy)        3. Abnormal EKG        4. RBBB (right bundle branch block)        5. Primary hypertension        6. Nocturnal oxygen desaturation        7. Awareness of heart beat               Plan:       1.  Aortic Stenosis: Status post tissue aortic valve replacement on 10/4.  Doing well postoperatively.  She was hoping to get back to her regular exercise regimen, and I recommended that she start walking on a flat surface and slowly increase her activity level.  At this time, she is not going to participate in cardiac rehab because she would have to drive 60 miles to [place].  We discussed SBE prophylaxis and prescribed amoxicillin.    2.  Moderate LVH noted on echocardiogram.    3/4.  Abnormal EKG: She has a chronic right bundle branch block and LVH noted with T wave inversions.    5.  Hypertension: Blood pressure well controlled.    6.  Nocturnal oxygen: She will discuss the need for this with her CV surgeon.    7.  Awareness of heartbeat: She denies any irregularity or tachycardia.  Her heart sound does sound prominent today and I suspect she may need a little bit more water.    8.  I recommend a 3-month follow-up visit with Dr. Ortiz Jorgensen.    As always, it has been a pleasure to participate in your patient's care. Thank you.          Sincerely,         CARLOS Vieyra  [facility] Cardiology      Dictated utilizing Dragon Dictation  I spent 38 minutes reviewing her medical records/testing/previous office notes/labs, face-to-face interaction with patient, physical examination, formulating the plan of care, and discussion of plan of care with patient.

## 2023-10-26 ENCOUNTER — NURSE TRIAGE (OUTPATIENT)
Dept: CALL CENTER | Facility: HOSPITAL | Age: 73
End: 2023-10-26
Payer: MEDICARE

## 2023-10-26 NOTE — TELEPHONE ENCOUNTER
Caller states is 3 weeks post op open heart. Instructed could shower and just let water run over incision but is asking if can face the shower. Instructed just to not let water stream hit incision line directly. OV notes on  for f/u on 10/09/2023 indicate incision healing well. Patient states is completely closed, no drainage or redness.    Also asking when can lift more than 10 pounds. Instructed caller to continue restrictions until surgeon or cardicac rehab tells her otherwise. Verbalized understanding.    Reason for Disposition   Surgical incision after sutures or staples removed, questions about    Additional Information   Negative: Major abdominal surgical incision and wound gaping open with visible internal organs   Negative: Sounds like a life-threatening emergency to the triager   Negative: Bleeding from incision and won't stop after 10 minutes of direct pressure   Negative: Bleeding (more than a few drops) from incision and after tracheostomy or blood vessel surgery (e.g., carotid endarterectomy, femoral bypass graft, kidney dialysis fistula)   Negative: Bright red, wide-spread, sunburn-like rash   Negative: SEVERE pain in the incision   Negative: Incision gaping open and < 2 days (48 hours) since wound re-opened   Negative: Incision gaping open and length of opening > 2 inches (5 cm)   Negative: Patient sounds very sick or weak to the triager   Negative: Sounds like a serious complication to the triager   Negative: Fever > 100.4 F (38.0 C)   Negative: Incision looks infected (spreading redness, pain)   Negative: Red streak runs from the incision and longer than 1 inch (2.5 cm)   Negative: Pus or bad-smelling fluid draining from incision   Negative: Dressing soaked with blood or body fluid (e.g., drainage)   Negative: Raised bruise and size > 2 inches (5 cm) and expanding   Negative: Scant bleeding (e.g., few drops) from incision and after tracheostomy or blood vessel surgery (e.g., carotid endarterectomy,  "femoral bypass graft, kidney dialysis fistula   Negative: Caller has URGENT question and triager unable to answer question   Negative: Incision on the face gaping open and length of opening > 1/4 inch (6 mm), and over 2 days since wound re-opened   Negative: Incision gaping open and length of opening > 1/2 inch (1 cm) and over 2 days since wound re-opened   Negative: Clear or blood-tinged fluid draining from wound and no fever   Negative: Suture or staple removal is overdue   Negative: Patient wants to be seen   Negative: INCREASING pain in incision and over 2 days (48 hours) since surgery   Negative: Suture or staple came out early and caller wants wound checked   Negative: Caller has NON-URGENT question and triager unable to answer question   Negative: Pimple where a stitch comes through the skin   Negative: Suture or staple came out early   Negative: Mild bruising near incision site   Negative: Suture removal date, questions about   Negative: Skin tape (e.g., Steri-Strips) removal, questions about   Negative: Sutured or stapled surgical incision, questions about    Answer Assessment - Initial Assessment Questions  1. SYMPTOM: \"What's the main symptom you're concerned about?\" (e.g., drainage, incision opening up, pain, redness)      NO issues with incision. Requesting when will be able to shower facing water.  2. ONSET: \"When did   start?\"        3. SURGERY: \"What surgery did you have?\"      AORTIC VALVE REPAIR/REPLACEMENT with MINI J STERNOTOMY  4. DATE of SURGERY: \"When was the surgery?\"       10/04/2023  5. INCISION SITE: \"Where is the incision located?\"       Mid sternum  6. REDNESS: \"Is there any redness at the incision site?\" If Yes, ask: \"How wide across is the redness?\" (Inches, centimeters)       Denies  7. PAIN: \"Is there any pain?\" If Yes, ask: \"How bad is it?\"  (Scale 1-10; or mild, moderate, severe)    - NONE (0): no pain    - MILD (1-3): doesn't interfere with normal activities     - MODERATE (4-7): " "interferes with normal activities or awakens from sleep     - SEVERE (8-10): excruciating pain, unable to do any normal activities      Denies  8. BLEEDING: \"Is there any bleeding?\" If Yes, ask: \"How much?\" and \"Where?\"      Denies  9. DRAINAGE: \"Is there any drainage from the incision site?\" If Yes, ask: \"What color and how much?\" (e.g., red, cloudy, pus; drops, teaspoon)      Denies  10. FEVER: \"Do you have a fever?\" If Yes, ask: \"What is your temperature, how was it measured, and when did it start?\"        Denies  11. OTHER SYMPTOMS: \"Do you have any other symptoms?\" (e.g., dizziness, rash elsewhere on body, shaking chills, weakness)        Denies    Protocols used: Post-Op Incision Symptoms and Questions-ADULT-[place]    "

## 2023-10-30 ENCOUNTER — OUTSIDE FACILITY SERVICE (OUTPATIENT)
Dept: CARDIAC SURGERY | Facility: CLINIC | Age: 73
End: 2023-10-30
Payer: MEDICARE

## 2023-11-14 ENCOUNTER — OFFICE VISIT (OUTPATIENT)
Dept: CARDIAC SURGERY | Facility: CLINIC | Age: 73
End: 2023-11-14
Payer: MEDICARE

## 2023-11-14 VITALS
DIASTOLIC BLOOD PRESSURE: 82 MMHG | TEMPERATURE: 97.1 F | HEIGHT: 64 IN | WEIGHT: 169 LBS | RESPIRATION RATE: 20 BRPM | BODY MASS INDEX: 28.85 KG/M2 | OXYGEN SATURATION: 99 % | SYSTOLIC BLOOD PRESSURE: 160 MMHG | HEART RATE: 72 BPM

## 2023-11-14 DIAGNOSIS — Z95.2 S/P AVR: Primary | ICD-10-CM

## 2023-11-14 PROCEDURE — 99024 POSTOP FOLLOW-UP VISIT: CPT | Performed by: NURSE PRACTITIONER

## 2023-11-14 PROCEDURE — 1160F RVW MEDS BY RX/DR IN RCRD: CPT | Performed by: NURSE PRACTITIONER

## 2023-11-14 PROCEDURE — 3077F SYST BP >= 140 MM HG: CPT | Performed by: NURSE PRACTITIONER

## 2023-11-14 PROCEDURE — 3079F DIAST BP 80-89 MM HG: CPT | Performed by: NURSE PRACTITIONER

## 2023-11-14 PROCEDURE — 1159F MED LIST DOCD IN RCRD: CPT | Performed by: NURSE PRACTITIONER

## 2023-11-14 NOTE — PROGRESS NOTES
"CARDIOVASCULAR SURGERY FOLLOW-UP PROGRESS NOTE  Chief Complaint: post op    HPI:   Dear Dr. Nguyen, CARLOS Alas and colleagues:    It was nice to see Melina Martin in follow up  almost 6 weeks after surgery.  As you know, she is a 73 y.o. female with a history of hypertension, GERD, right breast CA s/p lumpectomy, and hyperlipidemia who underwent mini-sternotomy AVR and annular root enlargement with pericardial patch with Dr. Boss on 10/4/23 (see op note for full report). She did well postoperatively and continues to do well. She failed an overnight oximetry prior to discharge, and was sent home with nocturnal oxygen. She comes in today complaining of \"intermittent pronounced heart beat\". She notices this most when she is sitting still, and says that her heart rate is not fast/racing it is just louder at times. I reassured her that this is common after surgery, and should improve with time. She is to notify myself or Dr. Jorgensen if it does not. She denies shortness of breath, dizziness, or edema.  Her activity level has been good, she is ambulating outside daily. Plans for cardiac rehab in the coming weeks.  From a surgical standpoint, the sternal incision is well approximated without erythema, edema, or drainage. The sternum is stable to palpation, and the patient denies any popping or clicking with deep inspiration or coughing. He blood pressure is elevated today in the office, but she reports good control at home.    Physical Exam:         /82 (BP Location: Left arm, Patient Position: Sitting, Cuff Size: Adult)   Pulse 72   Temp 97.1 °F (36.2 °C)   Resp 20   Ht 162.6 cm (64\")   Wt 76.7 kg (169 lb)   LMP  (LMP Unknown)   SpO2 99%   BMI 29.01 kg/m²   Heart:  regular rate and rhythm, S1, S2 normal, no murmur, click, rub or gallop  Lungs:  clear to auscultation bilaterally  Extremities:  no edema  Incision(s):  mid chest healing well, no significant drainage, no dehiscence, and no significant " erythema    Assessment/Plan:     S/P mini AVR. Overall, she is doing well.    No significant post-op complications  She is inquiring about discontinuing the oxygen. I recommend follow up with PCP for formal sleep study in the chance there is underlying sleep apnea  She is to take lifelong prophylactic antibiotics prior to dental/invasive procedures  Continue to track blood pressures at home. Notify cardiology if hypertension persists    Keep incisions clean and dry  OK to drive if not taking narcotic pain medicine  OK to begin cardiac rehab  Follow-up as scheduled with cardiology  Follow-up as scheduled with PCP  Follow-up with CT surgery prn    Continue lifting restriction of 50 lbs until 12 weeks from the date of surgery, no excessive jarring motions or twisting motions until 12 weeks from the date of surgery    Return to clinic if any signs or symptoms of infection or sternal instability develop     Thank you for allowing me to participate in the care of your patient.    Regards,  CARLOS Mora

## 2023-11-21 ENCOUNTER — OFFICE VISIT (OUTPATIENT)
Dept: CARDIAC REHAB | Facility: HOSPITAL | Age: 73
End: 2023-11-21
Payer: MEDICARE

## 2023-11-21 VITALS
HEART RATE: 81 BPM | SYSTOLIC BLOOD PRESSURE: 118 MMHG | BODY MASS INDEX: 30.19 KG/M2 | DIASTOLIC BLOOD PRESSURE: 80 MMHG | WEIGHT: 176.81 LBS | HEIGHT: 64 IN | OXYGEN SATURATION: 98 %

## 2023-11-21 DIAGNOSIS — Z95.2 S/P AVR (AORTIC VALVE REPLACEMENT): Primary | ICD-10-CM

## 2023-11-21 PROCEDURE — 93798 PHYS/QHP OP CAR RHAB W/ECG: CPT

## 2023-11-21 NOTE — PROGRESS NOTES
Phase II and III Education    Discipline Teaching:  Cardiac Rehab Phase II [x]      Cardiac Rehab Phase III []      Pulmonary Rehab II []      Pulmonary Rehab III []      Peripheral Artery Disease []        Class Given:  Asthma Management []      Beginners Cardiac Rehab [x]      Beginners Pulmonary Rehab []      CAD I []      CAD II []      CHF []      Diabetes Mellitus []     Diet & Cholesterol []     Diet Consult []      Energy Conservation []     Exercise []     Grocery Store Tour []     Harmonica Therapy []     High Blood Pressure []     Living with COPD []     Medication Safety []     Peripheral Artery Disease []     S & S of Infection []      Stress []        Education Topics:  Angina []      Arrhythmias []      Asthma Management []      Beginning Cardiac Rehab [x]      Blood Pressure [x]     Blood Sugar []     Breathing Retrainment []     CHF []     Cooking []     Daily Weight []     Diabetes Mellitus []      Diet [x]     Energy Conservation []     Exercise [x]      Foot Care []      Grocery Store Tour []      Heart Disease [x]      Heart Function []      Incision Care []     Living with COPD []     Medication Safety []     PAD Symptoms/Treatment []     Reconditioning Exercises []     S & S Infection []     Smoking Consult []     Stress Management []     Test Results []       Teaching Aids:  Video []      PAD Handout []      Pulmonary Workbook []      CAD Teaching Packet [x]      Stress Teaching Packet []     Exercise Teaching Packet []      Diet Teaching Packet []      CHF Teaching Packet []      Blood Pressure Teaching Packet [x]      Smoking Cessation Packet []      Medication Safety Handout []      Pflex Training []      Diabetes Teaching Packet []      Harmonica Therapy Packet []        Teaching Method:  Discussion [x]      Written Information [x]      Audio Visual [x]      Demonstration []        Teaching Recipient:  Patient [x]      Family []      Friend []      Legal Guardian []      Primary Care  Giver []      Significant Other []        Barriers To Learning:  None [x]      Auditory []      Cultural []      Emotional []      Financial []      Language []    Mental []      Motivation []      Physical []      Reading Skills []      Muslim []      Verbal/Cognitive []      Visual []      Written/Cognitive []        Teaching Response:  Verified Via Teach back [x]      Return Demo Via Teach Back []      Reinforcement Needed []      Unable to Return Demo []      Unable to Comprehend []      Declines Teaching  []        Additional Education Topics:    Follow Up Instruction Comment:    Pt tolerated exercises see scanned report.

## 2023-11-21 NOTE — PROGRESS NOTES
Chief Complaint  Cardiac Rehab Phase II    Melina Martin presents to [facility] CARDIOPULMONARY REHABILITATION    Physical Exam  Constitutional:       Appearance: Normal appearance.   Cardiovascular:      Rate and Rhythm: Normal rate and regular rhythm.      Heart sounds: Normal heart sounds, S1 normal and S2 normal.   Pulmonary:      Effort: Pulmonary effort is normal.      Breath sounds: Normal breath sounds.   Musculoskeletal:      Right lower leg: No edema.      Left lower leg: No edema.   Skin:     General: Skin is warm and dry.   Neurological:      General: No focal deficit present.      Mental Status: She is alert and oriented to person, place, and time.   Psychiatric:         Attention and Perception: Attention and perception normal.         Mood and Affect: Mood and affect normal.         Speech: Speech normal.         Behavior: Behavior normal. Behavior is cooperative.         Thought Content: Thought content normal.         Cognition and Memory: Cognition and memory normal.         Judgment: Judgment normal.      Result Review :          Assessment and Plan    Diagnoses and all orders for this visit:    1. S/P AVR (aortic valve replacement) (Primary)        Ele Hurtado RN

## 2023-12-21 ENCOUNTER — OFFICE VISIT (OUTPATIENT)
Dept: ONCOLOGY | Facility: HOSPITAL | Age: 73
End: 2023-12-21
Payer: MEDICARE

## 2023-12-21 VITALS
DIASTOLIC BLOOD PRESSURE: 58 MMHG | BODY MASS INDEX: 31.03 KG/M2 | WEIGHT: 180.78 LBS | HEART RATE: 75 BPM | SYSTOLIC BLOOD PRESSURE: 143 MMHG | OXYGEN SATURATION: 98 % | RESPIRATION RATE: 18 BRPM | TEMPERATURE: 97.8 F

## 2023-12-21 DIAGNOSIS — D05.12 BREAST NEOPLASM, TIS (DCIS), LEFT: ICD-10-CM

## 2023-12-21 RX ORDER — ANASTROZOLE 1 MG/1
1 TABLET ORAL DAILY
Qty: 90 TABLET | Refills: 1 | Status: SHIPPED | OUTPATIENT
Start: 2023-12-21

## 2023-12-21 RX ORDER — CHLORHEXIDINE GLUCONATE ORAL RINSE 1.2 MG/ML
SOLUTION DENTAL
COMMUNITY
Start: 2023-12-18

## 2023-12-21 NOTE — PROGRESS NOTES
"Chief Complaint/Reason for Referral:  FOLLOW UP 2 - Dr. Sharma transfer BREAST CA    Maria Ines Nguyen, APRN  Maria Ines Nguyen, APRN      Subjective    History of Present Illness    Ms. Melina Martin presents for 6 month follow up for left breast DCIS. Reports she has OHS in October and is recovering well from that. She is back to walking 2 miles a day outside. Reports her heart murmur went away. Taking Anastrozole daily and tolerated well. Reports she will be done with taking med in August of 2024.     Mammogram had to be rescheduled to January due to her surgery.       Cancer Staging   No matching staging information was found for the patient.       Treatment intent: curative    Oncology/Hematology History Overview Note     DCIS:     LEFT breast, UIQ, Diagnosed 4/22/19; 6 mm, \"low grade\", ER: 100%, NM: 100%; staged pTis N0 M0 stage 0      - Left lumpectomy 6/13/19 -Dr. Fuentes      - Adjuvant XRT complete 8/26/19 - Dr. Sanderson      - started adjuvant anastrazole 9/16/19             Review of Systems   Constitutional: Negative.    HENT:  Positive for rhinorrhea.    Eyes: Negative.    Respiratory: Negative.     Cardiovascular:  Negative for chest pain, palpitations and leg swelling.   Gastrointestinal: Negative.    Endocrine: Negative.    Genitourinary: Negative.    Musculoskeletal: Negative.    Skin: Negative.    Allergic/Immunologic: Negative.    Neurological: Negative.    Hematological: Negative.    Psychiatric/Behavioral: Negative.         Current Outpatient Medications on File Prior to Visit   Medication Sig Dispense Refill    amLODIPine (NORVASC) 2.5 MG tablet Take 1 tablet by mouth Daily. 90 tablet 2    APPLE CIDER VINEGAR PO Take 1 tablet by mouth Daily. PT HOLDING FOR SURGERY      ASPIRIN 81 PO Take 81 mg by mouth Daily.      Calcium Carb-Cholecalciferol (CALCIUM 500 + D PO) Take 1 tablet by mouth Daily.      chlorhexidine (PERIDEX) 0.12 % solution RINSE WITH 1 TEASPOONFUL TWICE DAILY FOR 10 DAYS      " Coenzyme Q10 (CO Q 10 PO) Take 1 capsule by mouth Daily. PT HOLDING FOR SURGERY      diphenhydrAMINE-APAP, sleep, (TYLENOL PM EXTRA STRENGTH PO) Take  by mouth Every Night.      Glucosamine-Chondroitin (OSTEO BI-FLEX REGULAR STRENGTH PO) Take 1 tablet by mouth Daily. PT HOLDING FOR SURGERY      Melatonin 10 MG tablet Take 1 tablet by mouth Every Night.      metoprolol tartrate (LOPRESSOR) 50 MG tablet Take 1 tablet by mouth Every 12 (Twelve) Hours. 180 tablet 2    rosuvastatin (CRESTOR) 40 MG tablet Take 1 tablet by mouth Every Night.      [DISCONTINUED] anastrozole (ARIMIDEX) 1 MG tablet Take 1 tablet by mouth Daily. 90 tablet 1    amoxicillin (AMOXIL) 500 MG capsule Take 4 capsules by mouth See Admin Instructions. 4 capsules 1 hour prior to procedure (Patient not taking: Reported on 11/14/2023) 12 capsule 0     No current facility-administered medications on file prior to visit.       No Known Allergies  Past Medical History:   Diagnosis Date    Anxiety     Aortic stenosis     Cataract     DCIS (ductal carcinoma in situ)     2019: left sided, lumpectomy/XRT/anastrozole    GERD (gastroesophageal reflux disease)     Heart murmur     History of chronic sinusitis     HTN (hypertension)     Hyperlipidemia      Past Surgical History:   Procedure Laterality Date    AORTIC VALVE REPAIR/REPLACEMENT N/A 10/4/2023    Procedure: CHELA; AORTIC VALVE REPAIR/REPLACEMENT with MINI J STERNOTOMY; ANWALL ROOT ENLARGEMENT; TRANSESOPHAGEAL ECHOCARDIOGRAM WITH ANESTHESIA;  Surgeon: Wali Boss MD;  Location: [facility] CVOR;  Service: Cardiothoracic;  Laterality: N/A;    BREAST BIOPSY Left     MALIGNANT(LOW GRADE DUCTAL CARCINOMA)    BREAST LUMPECTOMY Left 06/13/2019    Procedure: Left breast needle localized lumpectomy,;  Surgeon: Beatriz Fuentes MD;  Location: [facility] OR [facility];  Service: General    CARDIAC CATHETERIZATION N/A 08/24/2023    Procedure: Coronary angiography;  Surgeon: Bakari Newman MD;  Location: [facility] CATH  INVASIVE LOCATION;  Service: Cardiology;  Laterality: N/A;    CARDIAC CATHETERIZATION N/A 2023    Procedure: Left heart cath;  Surgeon: Bakari Newman MD;  Location:  [facility] CATH INVASIVE LOCATION;  Service: Cardiology;  Laterality: N/A;    CARDIAC CATHETERIZATION  2023    CATARACT EXTRACTION, BILATERAL       SECTION      X 1    COLONOSCOPY      HAND SURGERY Right     OVARIAN CYST REMOVAL       Social History     Socioeconomic History    Marital status:     Number of children: 2   Tobacco Use    Smoking status: Former     Packs/day: 0.25     Years: 5.00     Additional pack years: 0.00     Total pack years: 1.25     Types: Cigarettes     Quit date: 1982     Years since quittin.9     Passive exposure: Past    Smokeless tobacco: Never    Tobacco comments:     Social smoker before    Vaping Use    Vaping Use: Never used   Substance and Sexual Activity    Alcohol use: Yes     Comment: 1-2 DRINKS MONTHLY     Drug use: No    Sexual activity: Not Currently     Partners: Male     Birth control/protection: Post-menopausal     Family History   Problem Relation Age of Onset    Stroke Father     Hearing loss Father     Heart attack Father     Colon cancer Mother 89    Hearing loss Mother     Hypertension Mother     Diabetes Brother     Heart attack Brother     Depression Daughter     Breast cancer Maternal Aunt     Ovarian cancer Cousin     Cancer Cousin         FEMALE CANCER OF UNKNOWN ORGIN 60S    Malig Hyperthermia Neg Hx     Deep vein thrombosis Neg Hx     Pulmonary embolism Neg Hx     Uterine cancer Neg Hx      Immunization History   Administered Date(s) Administered    COVID-19 (MODERNA) BIVALENT 12+YRS 10/01/2022    COVID-19 (PFIZER) Purple Cap Monovalent 2021, 2021, 10/15/2021    COVID-19 F23 (PFIZER) 12YRS+ (COMIRNATY) 2023    FLUAD TRI 65YR+ 10/18/2017    Fluad Quad 65+ 10/01/2022, 2023    Shingrix 2023    Tdap 2023    Zoster, Unspecified  01/01/2012, 01/01/2014       Tobacco Use: Medium Risk (12/21/2023)    Patient History     Smoking Tobacco Use: Former     Smokeless Tobacco Use: Never     Passive Exposure: Past       Objective     Physical Exam  Vitals and nursing note reviewed.   Constitutional:       Appearance: Normal appearance.   HENT:      Head: Normocephalic.      Nose: Nose normal.      Mouth/Throat:      Mouth: Mucous membranes are moist.   Eyes:      Pupils: Pupils are equal, round, and reactive to light.   Cardiovascular:      Rate and Rhythm: Normal rate and regular rhythm.      Pulses: Normal pulses.      Heart sounds: Normal heart sounds. No murmur heard.  Pulmonary:      Effort: Pulmonary effort is normal. No respiratory distress.      Breath sounds: Normal breath sounds.   Abdominal:      General: Bowel sounds are normal.      Palpations: Abdomen is soft.   Musculoskeletal:         General: Normal range of motion.      Cervical back: Normal range of motion and neck supple.   Skin:     General: Skin is warm and dry.      Capillary Refill: Capillary refill takes less than 2 seconds.   Neurological:      General: No focal deficit present.      Mental Status: She is alert and oriented to person, place, and time.   Psychiatric:         Mood and Affect: Mood normal.         Behavior: Behavior normal.         Thought Content: Thought content normal.         Judgment: Judgment normal.         Vitals:    12/21/23 1306   BP: 143/58   Pulse: 75   Resp: 18   Temp: 97.8 °F (36.6 °C)   TempSrc: Temporal   SpO2: 98%   Weight: 82 kg (180 lb 12.4 oz)   PainSc: 0-No pain       Wt Readings from Last 3 Encounters:   12/21/23 82 kg (180 lb 12.4 oz)   11/21/23 80.2 kg (176 lb 12.9 oz)   11/14/23 76.7 kg (169 lb)               ECOG score: 0         ECOG: (0) Fully Active - Able to Carry On All Pre-disease Performance Without Restriction  Fall Risk Assessment was completed, and patient is at low risk for falls.  PHQ-9 Total Score: 0       The patient is   "experiencing fatigue. Fatigue score: 1    PT/OT Functional Screening: PT fx screen : No needs identified  Speech Functional Screening: Speech fx screen : No needs identified  Rehab to be ordered: Rehab to be ordered : No needs identified        Result Review :  The following data was reviewed by: CARLOS Rdz on 12/21/2023:  Lab Results   Component Value Date    HGB 10.4 (L) 10/08/2023    HCT 32.1 (L) 10/08/2023    MCV 83.8 10/08/2023     10/08/2023    WBC 7.46 10/08/2023    NEUTROABS 7.09 (H) 10/05/2023    LYMPHSABS 0.81 10/05/2023    MONOSABS 0.57 10/05/2023    EOSABS 0.00 10/05/2023    BASOSABS 0.02 10/05/2023     Lab Results   Component Value Date    GLUCOSE 131 (H) 10/09/2023    BUN 10 10/09/2023    CREATININE 0.52 (L) 10/09/2023     10/09/2023    K 4.1 10/09/2023     10/09/2023    CO2 31.0 (H) 10/09/2023    CALCIUM 8.6 10/09/2023    PROTEINTOT 7.1 09/29/2023    ALBUMIN 3.9 10/04/2023    BILITOT 0.4 09/29/2023    ALKPHOS 100 09/29/2023    AST 17 09/29/2023    ALT 24 09/29/2023        No results found for: \"IRON\", \"LABIRON\", \"TRANSFERRIN\", \"TIBC\"  No results found for: \"LDH\", \"FERRITIN\", \"USZHGWZU72\", \"FOLATE\"  No results found for: \"PSA\", \"CEA\", \"AFP\", \"\", \"\"    XR Chest 1 View    Result Date: 10/6/2023  Postsurgical changes of aortic valve replacement. Interval removal of mediastinal drain. Unchanged normal size cardiomediastinal silhouette with prosthetic aortic valve. No focal consolidation. No pleural effusion or pneumothorax. Nondisplaced median sternotomy wires.    This report was finalized on 10/6/2023 10:26 AM by Dr. Sixto Hazel M.D on Workstation: [id number]      XR Chest 1 View    Result Date: 10/6/2023  Slight interval increase in atelectasis at the left lung base.  This report was finalized on 10/6/2023 5:21 AM by Dr. Linnette Benavides M.D on Workstation: [id number]      XR Chest 1 View    Result Date: 10/5/2023  Increasing vascular congestion and " bibasilar atelectasis.  This report was finalized on 10/5/2023 4:31 AM by Dr. Linnette Benavides M.D on Workstation: [id number]      XR Chest 1 View    Result Date: 10/4/2023  Postsurgical changes.    This report was finalized on 10/4/2023 5:55 PM by Dr. Anthony Candelaria M.D on Workstation: [id number]      XR Chest Post CVA Port    Result Date: 10/4/2023  Right IJ pulmonary artery catheter with tip overlying the RVOT/main pulmonary artery. No pneumothorax.  No focal consolidation. No pleural effusion. Normal size cardiomediastinal silhouette. No focal osseous abnormality.    This report was finalized on 10/4/2023 11:30 AM by Dr. Sixto Hazel M.D on Workstation: [id number]             Assessment and Plan:  Diagnoses and all orders for this visit:    1. Breast neoplasm, Tis (DCIS), left  -     anastrozole (ARIMIDEX) 1 MG tablet; Take 1 tablet by mouth Daily.  Dispense: 90 tablet; Refill: 1      Left breast DCIS diagnosed in April of 2019. Completed left lumpectomy and radiation. Started Anastrozole in September of 2019 and is tolerating well. Needs refill today.     Due for mammogram but had to be rescheduled to January due to recent aortic valve stenosis surgery she had done in October. Recovering well from OHS and is back to walking 2 miles a day.     Dexa scan due in September of 2025. Last BMD normal.     Will call with mammogram results when completed.     Refill Anastrozole.     Call with any questions.     Follow up in 6 months with MD.           Patient Follow Up: 6 months with MD.     Patient was given instructions and counseling regarding her condition or for health maintenance advice. Please see specific information pulled into the AVS if appropriate.     Shannen Mcwilliams, APRN    12/21/2023    .tob

## 2024-01-17 ENCOUNTER — HOSPITAL ENCOUNTER (OUTPATIENT)
Dept: MAMMOGRAPHY | Facility: HOSPITAL | Age: 74
Discharge: HOME | End: 2024-01-17
Admitting: OBSTETRICS & GYNECOLOGY
Payer: MEDICARE

## 2024-01-17 DIAGNOSIS — Z85.3 HISTORY OF LEFT BREAST CANCER: ICD-10-CM

## 2024-01-17 DIAGNOSIS — Z12.31 ENCOUNTER FOR SCREENING MAMMOGRAM FOR HIGH-RISK PATIENT: ICD-10-CM

## 2024-01-17 PROCEDURE — 77063 BREAST TOMOSYNTHESIS BI: CPT

## 2024-01-17 PROCEDURE — 77067 SCR MAMMO BI INCL CAD: CPT

## 2024-01-22 ENCOUNTER — PREP FOR SURGERY (OUTPATIENT)
Dept: OTHER | Facility: HOSPITAL | Age: 74
End: 2024-01-22
Payer: MEDICARE

## 2024-01-22 ENCOUNTER — CLINICAL SUPPORT (OUTPATIENT)
Dept: GASTROENTEROLOGY | Facility: CLINIC | Age: 74
End: 2024-01-22
Payer: MEDICARE

## 2024-01-22 DIAGNOSIS — Z86.010 HISTORY OF COLON POLYPS: ICD-10-CM

## 2024-01-22 DIAGNOSIS — Z12.11 ENCOUNTER FOR SCREENING FOR MALIGNANT NEOPLASM OF COLON: Primary | ICD-10-CM

## 2024-01-22 DIAGNOSIS — Z80.0 FAMILY HISTORY OF COLON CANCER IN MOTHER: ICD-10-CM

## 2024-01-22 PROBLEM — Z86.0100 HISTORY OF COLON POLYPS: Status: ACTIVE | Noted: 2024-01-22

## 2024-01-22 RX ORDER — POLYETHYLENE GLYCOL 3350, SODIUM SULFATE, POTASSIUM CHLORIDE, MAGNESIUM SULFATE, AND SODIUM CHLORIDE FOR ORAL SOLUTION 178.7-7.3G
178.7 KIT ORAL TAKE AS DIRECTED
Qty: 178.7 EACH | Refills: 0 | Status: SHIPPED | OUTPATIENT
Start: 2024-01-22

## 2024-01-22 NOTE — PROGRESS NOTES
Melina GEE Martin  1950  73 y.o.    Reason for call: Recall- 3 yr recall, hx colon polyps, fm hx colon cancer, last colon 2019- [facility] -Patient requested June scope due to recent heart surgery  Prep prescribed: Suflave  Prep instructions reviewed with patient and sent to patient via regular mail to the home address on file  Is the patient currently on any injectable medications for weight loss or diabetes? No  Clearance needed? Yes  If yes, what clearance is needed? Cardiology  Clearance has been requested from Dr. Ortiz Jorgensen  The patient has been scheduled for: Colonoscopy  After your procedure, you will be contacted with results. Please confirm the best phone # to reach the patient: [phone]  Family history of colon cancer? Yes  If yes, indicate relative: Mother  Family History   Problem Relation Age of Onset    Stroke Father     Hearing loss Father     Heart attack Father     Colon cancer Mother 89    Hearing loss Mother     Hypertension Mother     Diabetes Brother     Heart attack Brother     Depression Daughter     Breast cancer Maternal Aunt     Ovarian cancer Cousin     Cancer Cousin         FEMALE CANCER OF UNKNOWN ORGIN 60S    Malig Hyperthermia Neg Hx     Deep vein thrombosis Neg Hx     Pulmonary embolism Neg Hx     Uterine cancer Neg Hx      Past Medical History:   Diagnosis Date    Anxiety     Aortic stenosis     Cataract     DCIS (ductal carcinoma in situ)     2019: left sided, lumpectomy/XRT/anastrozole    GERD (gastroesophageal reflux disease)     Heart murmur     History of chronic sinusitis     HTN (hypertension)     Hyperlipidemia      No Known Allergies  Past Surgical History:   Procedure Laterality Date    AORTIC VALVE REPAIR/REPLACEMENT N/A 10/4/2023    Procedure: CHELA; AORTIC VALVE REPAIR/REPLACEMENT with MINI J STERNOTOMY; ANWALL ROOT ENLARGEMENT; TRANSESOPHAGEAL ECHOCARDIOGRAM WITH ANESTHESIA;  Surgeon: Wali Boss MD;  Location: [facility];  Service: Cardiothoracic;  Laterality:  N/A;    BREAST BIOPSY Left     MALIGNANT(LOW GRADE DUCTAL CARCINOMA)    BREAST LUMPECTOMY Left 2019    Procedure: Left breast needle localized lumpectomy,;  Surgeon: Beatriz Fuentes MD;  Location: [facility];  Service: General    CARDIAC CATHETERIZATION N/A 2023    Procedure: Coronary angiography;  Surgeon: Bakari Newman MD;  Location: [facility] CATH INVASIVE LOCATION;  Service: Cardiology;  Laterality: N/A;    CARDIAC CATHETERIZATION N/A 2023    Procedure: Left heart cath;  Surgeon: Bakari Newman MD;  Location: [facility] CATH INVASIVE LOCATION;  Service: Cardiology;  Laterality: N/A;    CARDIAC CATHETERIZATION  2023    CATARACT EXTRACTION, BILATERAL       SECTION      X 1    COLONOSCOPY      HAND SURGERY Right     OVARIAN CYST REMOVAL       Social History     Socioeconomic History    Marital status:     Number of children: 2   Tobacco Use    Smoking status: Former     Packs/day: 0.25     Years: 5.00     Additional pack years: 0.00     Total pack years: 1.25     Types: Cigarettes     Quit date: 1982     Years since quittin.0     Passive exposure: Past    Smokeless tobacco: Never    Tobacco comments:     Social smoker before    Vaping Use    Vaping Use: Never used   Substance and Sexual Activity    Alcohol use: Yes     Comment: 1-2 DRINKS MONTHLY     Drug use: No    Sexual activity: Not Currently     Partners: Male     Birth control/protection: Post-menopausal       Current Outpatient Medications:     amLODIPine (NORVASC) 2.5 MG tablet, Take 1 tablet by mouth Daily., Disp: 90 tablet, Rfl: 2    anastrozole (ARIMIDEX) 1 MG tablet, Take 1 tablet by mouth Daily., Disp: 90 tablet, Rfl: 1    APPLE CIDER VINEGAR PO, Take 1 tablet by mouth Daily. PT HOLDING FOR SURGERY, Disp: , Rfl:     ASPIRIN 81 PO, Take 81 mg by mouth Daily., Disp: , Rfl:     Calcium Carb-Cholecalciferol (CALCIUM 500 + D PO), Take 1 tablet by mouth Daily., Disp: , Rfl:     chlorhexidine (PERIDEX)  0.12 % solution, RINSE WITH 1 TEASPOONFUL TWICE DAILY FOR 10 DAYS, Disp: , Rfl:     Coenzyme Q10 (CO Q 10 PO), Take 1 capsule by mouth Daily. PT HOLDING FOR SURGERY, Disp: , Rfl:     diphenhydrAMINE-APAP, sleep, (TYLENOL PM EXTRA STRENGTH PO), Take  by mouth Every Night., Disp: , Rfl:     Glucosamine-Chondroitin (OSTEO BI-FLEX REGULAR STRENGTH PO), Take 1 tablet by mouth Daily. PT HOLDING FOR SURGERY, Disp: , Rfl:     Melatonin 10 MG tablet, Take 1 tablet by mouth Every Night., Disp: , Rfl:     metoprolol tartrate (LOPRESSOR) 50 MG tablet, Take 1 tablet by mouth Every 12 (Twelve) Hours., Disp: 180 tablet, Rfl: 2    rosuvastatin (CRESTOR) 40 MG tablet, Take 1 tablet by mouth Every Night., Disp: , Rfl:     amoxicillin (AMOXIL) 500 MG capsule, Take 4 capsules by mouth See Admin Instructions. 4 capsules 1 hour prior to procedure (Patient not taking: Reported on 11/14/2023), Disp: 12 capsule, Rfl: 0

## 2024-03-15 ENCOUNTER — TELEPHONE (OUTPATIENT)
Dept: GASTROENTEROLOGY | Facility: CLINIC | Age: 74
End: 2024-03-15
Payer: MEDICARE

## 2024-03-15 RX ORDER — PEG-3350, SODIUM SULFATE, SODIUM CHLORIDE, POTASSIUM CHLORIDE, SODIUM ASCORBATE AND ASCORBIC ACID 7.5-2.691G
1000 KIT ORAL TAKE AS DIRECTED
Qty: 1000 ML | Refills: 0 | Status: SHIPPED | OUTPATIENT
Start: 2024-03-15

## 2024-03-15 NOTE — TELEPHONE ENCOUNTER
Ms Martin left  stating the Rx bowel prep for her upcoming colonoscopy was not at her pharmacy, [facility] in [place]..    I called pharmacy, stated we sent over Rx on 1.22.24.  [facility] confirmed they received the rx, but it was non formulary with pts ins.    [facility] ran other low volume preps, stated Movi prep is on pts formulary.  Will send over Rx.      Spoke with pt. Made aware of new Rx sent to pharmacy and will mail new prep instructions to pt. Voiced understanding. debbie

## 2024-05-23 ENCOUNTER — TELEPHONE (OUTPATIENT)
Dept: CARDIOLOGY | Facility: CLINIC | Age: 74
End: 2024-05-23
Payer: MEDICARE

## 2024-05-23 NOTE — TELEPHONE ENCOUNTER
She is at acceptable risk from a cardiac standpoint to proceed with colonoscopy and/or EGD.  She may hold her aspirin according to the GI MDs discretion (typically 5 days).  I have faxed a letter to Dr. Novoa's office.  Thank you    Please let her know.     Thank you.

## 2024-05-23 NOTE — TELEPHONE ENCOUNTER
Bisi refer to cardiac clearance letter sent from Dr. Novoa to Dr. Jorgensen in the letter tab of pt's chart.    Dr. Novoa from [facility] is requesting a cardiac clearance for pt to have a colonoscopy and/or esophagogaastroduodenoscopy on 6/17/24.    Clearance can be faxed to [phone].    Please advise.

## 2024-06-10 NOTE — PAT
Reminded of arrival time at      0630am   , Entrance C of the [facility] hospital. Instructed to bring or have a  over the age of 18 set up to drive you home the day of procedure.    Instructed on clear liquid diet the day before, nothing red or purple. Call with any questions about the prep or if in need of the prep.  Reminded them not to eat or drink anything am of procedure unless its a sip of water with medications.      Reviewed with patient clearance from her cardiologist.

## 2024-06-14 ENCOUNTER — ANESTHESIA EVENT (OUTPATIENT)
Dept: GASTROENTEROLOGY | Facility: HOSPITAL | Age: 74
End: 2024-06-14
Payer: MEDICARE

## 2024-06-14 NOTE — ANESTHESIA PREPROCEDURE EVALUATION
Anesthesia Evaluation     Patient summary reviewed   NPO Solid Status: > 8 hours  NPO Liquid Status: > 2 hours           Airway   Mallampati: II  Neck ROM: full  No difficulty expected  Dental - normal exam     Pulmonary     breath sounds clear to auscultation  (+) a smoker Former, cigarettes,  Cardiovascular   Exercise tolerance: good (4-7 METS)    ECG reviewed  Patient on routine beta blocker  Rhythm: regular  Rate: normal    (+) hypertension well controlled 2 medications or greater, valvular problems/murmurs (aortic valve replacement 10/23) AS, hyperlipidemia      Neuro/Psych  (+) psychiatric history Anxiety  GI/Hepatic/Renal/Endo    (+) GERD well controlled    Musculoskeletal (-) negative ROS    Abdominal    Substance History - negative use     OB/GYN          Other      history of cancer    ROS/Med Hx Other: ECG 12 Lead     Date/Time: 10/24/2023 12:34 PM  Performed by: Rachel Tavarez APRN     Authorized by: Rachel Tavarez APRN  Comparison: compared with previous ECG from 10/6/2023  Similar to previous ECG  Rhythm: sinus rhythm  Rate: normal  BPM: 81  Conduction: right bundle branch block  ST Segments: ST segments normal  T inversion: V1, V2, V4, V5, V6, I and aVL  QRS axis: normal  Other findings: T wave abnormality and left ventricular hypertrophy     Clinical impression: abnormal EKG      8/16/23 Stress Test  ·  Blood pressure demonstrated a hypertensive response to stress.  ·  No ECG evidence of myocardial ischemia.  ·  Negative clinical evidence of myocardial ischemia.  ·  Findings consistent with a normal ECG stress test.       LEFT VENTRICULOGRAPHY: The LV pressure was 198/26 aortic pressure was 142/62 with a peak pressure of 56 and a mean of 48.  Densely calcified aortic valve     CORONARY ANGIOGRAPHY:  Left main: Normal  Left anterior descending: Normal proximally normal in the mid and distal portion diagonals are free of obstructive disease  Ramus intermedius:Not present  Circumflex: Normal in  the proximal midportion and throughout  RCA: Is a dominant vessel.  Normal proximally 30% mid normal distally     SUMMARY: Very aortic stenosis with mild obstructive coronary disease    9/29/23 Carotid Duplex Study  ·  Mild hypoechoic plaque proximal right internal carotid artery with less than 50% ICA stenosis.  ·  Minimal hypoechoic plaque proximal left internal carotid artery with less than 50% ICA stenosis.                  Anesthesia Plan    ASA 4     general   total IV anesthesia  (Total IV Anesthesia    Patient understands anesthesia not responsible for dental damage.  )  intravenous induction     Anesthetic plan, risks, benefits, and alternatives have been provided, discussed and informed consent has been obtained with: patient.    Plan discussed with CRNA.    CODE STATUS:

## 2024-06-17 ENCOUNTER — HOSPITAL ENCOUNTER (OUTPATIENT)
Facility: HOSPITAL | Age: 74
Setting detail: HOSPITAL OUTPATIENT SURGERY
Discharge: HOME | End: 2024-06-17
Attending: INTERNAL MEDICINE | Admitting: INTERNAL MEDICINE
Payer: MEDICARE

## 2024-06-17 ENCOUNTER — ANESTHESIA (OUTPATIENT)
Dept: GASTROENTEROLOGY | Facility: HOSPITAL | Age: 74
End: 2024-06-17
Payer: MEDICARE

## 2024-06-17 VITALS
SYSTOLIC BLOOD PRESSURE: 133 MMHG | BODY MASS INDEX: 30.2 KG/M2 | TEMPERATURE: 96.4 F | RESPIRATION RATE: 18 BRPM | DIASTOLIC BLOOD PRESSURE: 61 MMHG | WEIGHT: 175.93 LBS | OXYGEN SATURATION: 100 % | HEART RATE: 78 BPM

## 2024-06-17 PROCEDURE — 25810000003 LACTATED RINGERS PER 1000 ML: Performed by: NURSE ANESTHETIST, CERTIFIED REGISTERED

## 2024-06-17 PROCEDURE — 25010000002 PROPOFOL 10 MG/ML EMULSION: Performed by: NURSE ANESTHETIST, CERTIFIED REGISTERED

## 2024-06-17 RX ORDER — LIDOCAINE HYDROCHLORIDE 20 MG/ML
INJECTION, SOLUTION EPIDURAL; INFILTRATION; INTRACAUDAL; PERINEURAL AS NEEDED
Status: DISCONTINUED | OUTPATIENT
Start: 2024-06-17 | End: 2024-06-17 | Stop reason: SURG

## 2024-06-17 RX ORDER — PHENYLEPHRINE HCL IN 0.9% NACL 1 MG/10 ML
SYRINGE (ML) INTRAVENOUS AS NEEDED
Status: DISCONTINUED | OUTPATIENT
Start: 2024-06-17 | End: 2024-06-17 | Stop reason: SURG

## 2024-06-17 RX ORDER — SODIUM CHLORIDE, SODIUM LACTATE, POTASSIUM CHLORIDE, CALCIUM CHLORIDE 600; 310; 30; 20 MG/100ML; MG/100ML; MG/100ML; MG/100ML
30 INJECTION, SOLUTION INTRAVENOUS CONTINUOUS
Status: DISCONTINUED | OUTPATIENT
Start: 2024-06-17 | End: 2024-06-17 | Stop reason: HOSPADM

## 2024-06-17 RX ORDER — PROPOFOL 10 MG/ML
VIAL (ML) INTRAVENOUS AS NEEDED
Status: DISCONTINUED | OUTPATIENT
Start: 2024-06-17 | End: 2024-06-17 | Stop reason: SURG

## 2024-06-17 RX ADMIN — Medication 100 MCG: at 08:00

## 2024-06-17 RX ADMIN — SODIUM CHLORIDE, POTASSIUM CHLORIDE, SODIUM LACTATE AND CALCIUM CHLORIDE 30 ML/HR: 600; 310; 30; 20 INJECTION, SOLUTION INTRAVENOUS at 07:29

## 2024-06-17 RX ADMIN — PROPOFOL 60 MG: 10 INJECTION, EMULSION INTRAVENOUS at 07:52

## 2024-06-17 RX ADMIN — LIDOCAINE HYDROCHLORIDE 100 MG: 20 INJECTION, SOLUTION INTRAVENOUS at 07:52

## 2024-06-17 RX ADMIN — PROPOFOL 200 MCG/KG/MIN: 10 INJECTION, EMULSION INTRAVENOUS at 07:53

## 2024-06-17 NOTE — ANESTHESIA POSTPROCEDURE EVALUATION
Patient: Melina Martin    Procedure Summary       Date: 06/17/24 Room / Location: [facility] ENDOSCOPY 2 / [facility] ENDOSCOPY    Anesthesia Start: 0748 Anesthesia Stop: 0824    Procedure: COLONOSCOPY Diagnosis:       Encounter for screening for malignant neoplasm of colon      History of colon polyps      Family history of colon cancer in mother      (Encounter for screening for malignant neoplasm of colon [Z12.11])      (History of colon polyps [Z86.010])      (Family history of colon cancer in mother [Z80.0])    Surgeons: Kodi Novoa MD Provider: Loli Ruiz CRNA    Anesthesia Type: general ASA Status: 4            Anesthesia Type: general    Vitals  Vitals Value Taken Time   /61 06/17/24 0846   Temp 35.8 °C (96.4 °F) 06/17/24 0840   Pulse 66 06/17/24 0847   Resp 18 06/17/24 0840   SpO2 99 % 06/17/24 0847   Vitals shown include unfiled device data.        Post Anesthesia Care and Evaluation    Post-procedure mental status: acceptable.  Pain management: satisfactory to patient    Airway patency: patent  Anesthetic complications: No anesthetic complications    Cardiovascular status: acceptable  Respiratory status: acceptable    Comments: Per chart review

## 2024-06-17 NOTE — H&P
ScreeningPre Procedure History & Physical    Chief Complaint:   Screening     Subjective     HPI:   Screening     Past Medical History:   Past Medical History:   Diagnosis Date    Anxiety     Aortic stenosis     Cataract     DCIS (ductal carcinoma in situ)     2019: left sided, lumpectomy/XRT/anastrozole    Encounter for screening for malignant neoplasm of colon 2024    GERD (gastroesophageal reflux disease)     Heart murmur     History of chronic sinusitis     HTN (hypertension)     Hyperlipidemia        Past Surgical History:  Past Surgical History:   Procedure Laterality Date    AORTIC VALVE REPAIR/REPLACEMENT N/A 10/4/2023    Procedure: CHELA; AORTIC VALVE REPAIR/REPLACEMENT with MINI J STERNOTOMY; ANWALL ROOT ENLARGEMENT; TRANSESOPHAGEAL ECHOCARDIOGRAM WITH ANESTHESIA;  Surgeon: Wali Boss MD;  Location: [facility] CVOR;  Service: Cardiothoracic;  Laterality: N/A;    BREAST BIOPSY Left     MALIGNANT(LOW GRADE DUCTAL CARCINOMA)    BREAST LUMPECTOMY Left 2019    Procedure: Left breast needle localized lumpectomy,;  Surgeon: Beatriz Fuentes MD;  [place]  [facility] [place] [facility] [place]  Service: General    CARDIAC CATHETERIZATION N/A 2023    Procedure: Coronary angiography;  Surgeon: Bakari Newman MD;  [facility];  Service: Cardiology;  Laterality: N/A;    CARDIAC CATHETERIZATION N/A 2023    Procedure: Left heart cath;  Surgeon: Bakari Newman MD;  [facility];  Service: Cardiology;  Laterality: N/A;    CARDIAC CATHETERIZATION  2023    CATARACT EXTRACTION, BILATERAL       SECTION      X 1    COLONOSCOPY      HAND SURGERY Right     OVARIAN CYST REMOVAL         Family History:  Family History   Problem Relation Age of Onset    Stroke Father     Hearing loss Father     Heart attack Father     Colon cancer Mother 89    Hearing loss Mother     Hypertension Mother     Diabetes Brother     Heart attack Brother     Depression Daughter      Breast cancer Maternal Aunt     Ovarian cancer Cousin     Cancer Cousin         FEMALE CANCER OF UNKNOWN ORGIN 60S    Malig Hyperthermia Neg Hx     Deep vein thrombosis Neg Hx     Pulmonary embolism Neg Hx     Uterine cancer Neg Hx        Social History:   reports that she quit smoking about 42 years ago. Her smoking use included cigarettes. She started smoking about 47 years ago. She has a 1.3 pack-year smoking history. She has been exposed to tobacco smoke. She has never used smokeless tobacco. She reports current alcohol use. She reports that she does not use drugs.    Medications:   Medications Prior to Admission   Medication Sig Dispense Refill Last Dose    amLODIPine (NORVASC) 2.5 MG tablet Take 1 tablet by mouth Daily. 90 tablet 2     anastrozole (ARIMIDEX) 1 MG tablet Take 1 tablet by mouth Daily. 90 tablet 1     APPLE CIDER VINEGAR PO Take 1 tablet by mouth Daily. PT HOLDING FOR SURGERY       ASPIRIN 81 PO Take 81 mg by mouth Daily.       Calcium Carb-Cholecalciferol (CALCIUM 500 + D PO) Take 1 tablet by mouth Daily.       chlorhexidine (PERIDEX) 0.12 % solution RINSE WITH 1 TEASPOONFUL TWICE DAILY FOR 10 DAYS       Coenzyme Q10 (CO Q 10 PO) Take 1 capsule by mouth Daily. PT HOLDING FOR SURGERY       diphenhydrAMINE-APAP, sleep, (TYLENOL PM EXTRA STRENGTH PO) Take  by mouth Every Night.       Glucosamine-Chondroitin (OSTEO BI-FLEX REGULAR STRENGTH PO) Take 1 tablet by mouth Daily. PT HOLDING FOR SURGERY       Melatonin 10 MG tablet Take 1 tablet by mouth Every Night.       metoprolol tartrate (LOPRESSOR) 50 MG tablet Take 1 tablet by mouth Every 12 (Twelve) Hours. 180 tablet 2     rosuvastatin (CRESTOR) 40 MG tablet Take 1 tablet by mouth Every Night.          Allergies:  Patient has no known allergies.        Objective     Blood pressure 149/67, pulse 65, temperature 97.6 °F (36.4 °C), temperature source Temporal, resp. rate 16, weight 79.8 kg (175 lb 14.8 oz), SpO2 98%, not currently  breastfeeding.    Physical Exam   Constitutional: Pt is oriented to person, place, and time and well-developed, well-nourished, and in no distress.   Mouth/Throat: Oropharynx is clear and moist.   Neck: Normal range of motion.   Cardiovascular: Normal rate, regular rhythm and normal heart sounds.    Pulmonary/Chest: Effort normal and breath sounds normal.   Abdominal: Soft. Nontender  Skin: Skin is warm and dry.   Psychiatric: Mood, memory, affect and judgment normal.     Assessment & Plan     Diagnosis:  Screening colonoscopy  H/o colon polyps     Anticipated Surgical Procedure:  colonoscopy    The risks, benefits, and alternatives of this procedure have been discussed with the patient or the responsible party- the patient understands and agrees to proceed.

## 2024-06-27 ENCOUNTER — OFFICE VISIT (OUTPATIENT)
Dept: ONCOLOGY | Facility: HOSPITAL | Age: 74
End: 2024-06-27
Payer: MEDICARE

## 2024-06-27 ENCOUNTER — LAB (OUTPATIENT)
Dept: ONCOLOGY | Facility: HOSPITAL | Age: 74
End: 2024-06-27
Payer: MEDICARE

## 2024-06-27 VITALS
OXYGEN SATURATION: 97 % | HEIGHT: 64 IN | BODY MASS INDEX: 30.8 KG/M2 | RESPIRATION RATE: 18 BRPM | TEMPERATURE: 97.4 F | SYSTOLIC BLOOD PRESSURE: 144 MMHG | DIASTOLIC BLOOD PRESSURE: 70 MMHG | HEART RATE: 59 BPM | WEIGHT: 180.4 LBS

## 2024-06-27 DIAGNOSIS — D64.9 ANEMIA, UNSPECIFIED TYPE: ICD-10-CM

## 2024-06-27 DIAGNOSIS — D05.12 BREAST NEOPLASM, TIS (DCIS), LEFT: ICD-10-CM

## 2024-06-27 DIAGNOSIS — Z12.31 ENCOUNTER FOR SCREENING MAMMOGRAM FOR BREAST CANCER: Primary | ICD-10-CM

## 2024-06-27 LAB
ALBUMIN SERPL-MCNC: 4.4 G/DL (ref 3.5–5.2)
ALBUMIN/GLOB SERPL: 1.6 G/DL
ALP SERPL-CCNC: 94 U/L (ref 39–117)
ALT SERPL W P-5'-P-CCNC: 23 U/L (ref 1–33)
ANION GAP SERPL CALCULATED.3IONS-SCNC: 12.2 MMOL/L (ref 5–15)
AST SERPL-CCNC: 19 U/L (ref 1–32)
BASOPHILS # BLD AUTO: 0.04 10*3/MM3 (ref 0–0.2)
BASOPHILS NFR BLD AUTO: 0.5 % (ref 0–1.5)
BILIRUB SERPL-MCNC: 0.5 MG/DL (ref 0–1.2)
BUN SERPL-MCNC: 17 MG/DL (ref 8–23)
BUN/CREAT SERPL: 25.4 (ref 7–25)
CALCIUM SPEC-SCNC: 10 MG/DL (ref 8.6–10.5)
CHLORIDE SERPL-SCNC: 102 MMOL/L (ref 98–107)
CO2 SERPL-SCNC: 25.8 MMOL/L (ref 22–29)
CREAT SERPL-MCNC: 0.67 MG/DL (ref 0.57–1)
DEPRECATED RDW RBC AUTO: 41.5 FL (ref 37–54)
EGFRCR SERPLBLD CKD-EPI 2021: 92.4 ML/MIN/1.73
EOSINOPHIL # BLD AUTO: 0.09 10*3/MM3 (ref 0–0.4)
EOSINOPHIL NFR BLD AUTO: 1.1 % (ref 0.3–6.2)
ERYTHROCYTE [DISTWIDTH] IN BLOOD BY AUTOMATED COUNT: 14 % (ref 12.3–15.4)
FERRITIN SERPL-MCNC: 183.5 NG/ML (ref 13–150)
FOLATE SERPL-MCNC: >20 NG/ML (ref 4.78–24.2)
GLOBULIN UR ELPH-MCNC: 2.7 GM/DL
GLUCOSE SERPL-MCNC: 104 MG/DL (ref 65–99)
HCT VFR BLD AUTO: 47.2 % (ref 34–46.6)
HGB BLD-MCNC: 15.4 G/DL (ref 12–15.9)
IMM GRANULOCYTES # BLD AUTO: 0.02 10*3/MM3 (ref 0–0.05)
IMM GRANULOCYTES NFR BLD AUTO: 0.2 % (ref 0–0.5)
IRON 24H UR-MRATE: 60 MCG/DL (ref 37–145)
IRON SATN MFR SERPL: 13 % (ref 20–50)
LYMPHOCYTES # BLD AUTO: 2.74 10*3/MM3 (ref 0.7–3.1)
LYMPHOCYTES NFR BLD AUTO: 33.4 % (ref 19.6–45.3)
MCH RBC QN AUTO: 26.9 PG (ref 26.6–33)
MCHC RBC AUTO-ENTMCNC: 32.6 G/DL (ref 31.5–35.7)
MCV RBC AUTO: 82.4 FL (ref 79–97)
MONOCYTES # BLD AUTO: 0.51 10*3/MM3 (ref 0.1–0.9)
MONOCYTES NFR BLD AUTO: 6.2 % (ref 5–12)
NEUTROPHILS NFR BLD AUTO: 4.8 10*3/MM3 (ref 1.7–7)
NEUTROPHILS NFR BLD AUTO: 58.6 % (ref 42.7–76)
NRBC BLD AUTO-RTO: 0 /100 WBC (ref 0–0.2)
PLATELET # BLD AUTO: 254 10*3/MM3 (ref 140–450)
PMV BLD AUTO: 9.8 FL (ref 6–12)
POTASSIUM SERPL-SCNC: 4.2 MMOL/L (ref 3.5–5.2)
PROT SERPL-MCNC: 7.1 G/DL (ref 6–8.5)
RBC # BLD AUTO: 5.73 10*6/MM3 (ref 3.77–5.28)
SODIUM SERPL-SCNC: 140 MMOL/L (ref 136–145)
TIBC SERPL-MCNC: 453 MCG/DL (ref 298–536)
TRANSFERRIN SERPL-MCNC: 304 MG/DL (ref 200–360)
VIT B12 BLD-MCNC: 543 PG/ML (ref 211–946)
WBC NRBC COR # BLD AUTO: 8.2 10*3/MM3 (ref 3.4–10.8)

## 2024-06-27 PROCEDURE — 36415 COLL VENOUS BLD VENIPUNCTURE: CPT

## 2024-06-27 PROCEDURE — 84466 ASSAY OF TRANSFERRIN: CPT

## 2024-06-27 PROCEDURE — 83540 ASSAY OF IRON: CPT

## 2024-06-27 PROCEDURE — 1126F AMNT PAIN NOTED NONE PRSNT: CPT | Performed by: INTERNAL MEDICINE

## 2024-06-27 PROCEDURE — 80053 COMPREHEN METABOLIC PANEL: CPT

## 2024-06-27 PROCEDURE — 85025 COMPLETE CBC W/AUTO DIFF WBC: CPT

## 2024-06-27 PROCEDURE — 82728 ASSAY OF FERRITIN: CPT

## 2024-06-27 PROCEDURE — 82746 ASSAY OF FOLIC ACID SERUM: CPT

## 2024-06-27 PROCEDURE — 99214 OFFICE O/P EST MOD 30 MIN: CPT | Performed by: INTERNAL MEDICINE

## 2024-06-27 PROCEDURE — 3078F DIAST BP <80 MM HG: CPT | Performed by: INTERNAL MEDICINE

## 2024-06-27 PROCEDURE — 3077F SYST BP >= 140 MM HG: CPT | Performed by: INTERNAL MEDICINE

## 2024-06-27 PROCEDURE — G0463 HOSPITAL OUTPT CLINIC VISIT: HCPCS | Performed by: INTERNAL MEDICINE

## 2024-06-27 PROCEDURE — 82607 VITAMIN B-12: CPT

## 2024-06-27 RX ORDER — ANASTROZOLE 1 MG/1
1 TABLET ORAL DAILY
Qty: 90 TABLET | Refills: 0 | Status: SHIPPED | OUTPATIENT
Start: 2024-06-27

## 2024-06-27 NOTE — PROGRESS NOTES
"Chief Complaint/Care Team   Breast neoplasm, Tis (DCIS), left    Maria Ines Nguyen W, APRN  Maria Ines Nguyen W, APRN    History of Present Illness     Diagnosis: Left Breast DCIS diagnosed 4/22/2019    Current Treatment: Anastrozole began on 9/16/2019, Patient due to stop this medication on 9/17/2024 (to complete 5 years of endocrine therapy).    Previous Treatment:     DCIS:   LEFT breast, UIQ, Diagnosed 4/22/19; 6 mm, \"low grade\", ER: 100%, MA: 100%; staged pTis N0 M0 stage 0      - Left lumpectomy 6/13/19 -Dr. Fuentes      - Adjuvant XRT complete 8/26/19 - Dr. Sanderson      - started adjuvant anastrazole 9/16/19     Melina Martin is a 73 y.o. female who presents to [facility] for Left Breast DCIS.    History of Present Illness  The patient presents for evaluation of left breast DCIS.    The patient has a history of left breast DCIS, for which she underwent a lumpectomy and radiation therapy. She again anastrozole therapy on 09/16/2019 and has since completed a 5-year course. She reports no adverse effects from anastrozole, including hot flashes or joint pain.  Patient due to stop this medication on 9/17/2024       Review of Systems     Oncology/Hematology History Overview Note     DCIS:     LEFT breast, UIQ, Diagnosed 4/22/19; 6 mm, \"low grade\", ER: 100%, MA: 100%; staged pTis N0 M0 stage 0      - Left lumpectomy 6/13/19 -Dr. Fuentes      - Adjuvant XRT complete 8/26/19 - Dr. Sanderson      - started adjuvant anastrazole 9/16/19             Objective     Vitals:    06/27/24 1321   BP: 144/70   Pulse: 59   Resp: 18   Temp: 97.4 °F (36.3 °C)   TempSrc: Temporal   SpO2: 97%   Weight: 81.8 kg (180 lb 6.4 oz)   Height: 162.6 cm (64\")   PainSc: 0-No pain     ECOG score: 0         PHQ-9 Total Score:         Physical Exam  Vitals reviewed. Exam conducted with a chaperone present.   Constitutional:       General: She is not in acute distress.     Appearance: Normal appearance.   HENT:      " Head: Normocephalic and atraumatic.   Eyes:      Extraocular Movements: Extraocular movements intact.      Conjunctiva/sclera: Conjunctivae normal.   Pulmonary:      Effort: Pulmonary effort is normal.   Musculoskeletal:      Cervical back: Normal range of motion and neck supple.   Skin:     General: Skin is warm and dry.      Findings: No bruising.   Neurological:      Mental Status: She is oriented to person, place, and time.         Physical Exam        Past Medical History     Past Medical History:   Diagnosis Date    Anxiety     Aortic stenosis     Cataract     DCIS (ductal carcinoma in situ)     2019: left sided, lumpectomy/XRT/anastrozole    Encounter for screening for malignant neoplasm of colon 1/22/2024    GERD (gastroesophageal reflux disease)     Heart murmur     History of chronic sinusitis     HTN (hypertension)     Hyperlipidemia      Current Outpatient Medications on File Prior to Visit   Medication Sig Dispense Refill    amLODIPine (NORVASC) 2.5 MG tablet Take 1 tablet by mouth Daily. 90 tablet 2    APPLE CIDER VINEGAR PO Take 1 tablet by mouth Daily. PT HOLDING FOR SURGERY      ASPIRIN 81 PO Take 81 mg by mouth Daily.      Calcium Carb-Cholecalciferol (CALCIUM 500 + D PO) Take 1 tablet by mouth Daily.      chlorhexidine (PERIDEX) 0.12 % solution RINSE WITH 1 TEASPOONFUL TWICE DAILY FOR 10 DAYS      Coenzyme Q10 (CO Q 10 PO) Take 1 capsule by mouth Daily. PT HOLDING FOR SURGERY      diphenhydrAMINE-APAP, sleep, (TYLENOL PM EXTRA STRENGTH PO) Take  by mouth Every Night.      Glucosamine-Chondroitin (OSTEO BI-FLEX REGULAR STRENGTH PO) Take 1 tablet by mouth Daily. PT HOLDING FOR SURGERY      Melatonin 10 MG tablet Take 1 tablet by mouth Every Night.      metoprolol tartrate (LOPRESSOR) 50 MG tablet Take 1 tablet by mouth Every 12 (Twelve) Hours. 180 tablet 2    rosuvastatin (CRESTOR) 40 MG tablet Take 1 tablet by mouth Every Night.      [DISCONTINUED] anastrozole (ARIMIDEX) 1 MG tablet Take 1 tablet  by mouth Daily. 90 tablet 1     No current facility-administered medications on file prior to visit.      No Known Allergies  Past Surgical History:   Procedure Laterality Date    AORTIC VALVE REPAIR/REPLACEMENT N/A 10/4/2023    Procedure: CHELA; AORTIC VALVE REPAIR/REPLACEMENT with MINI J STERNOTOMY; ANWALL ROOT ENLARGEMENT; TRANSESOPHAGEAL ECHOCARDIOGRAM WITH ANESTHESIA;  Surgeon: Wali Boss MD;  Location: [facility] CVOR;  Service: Cardiothoracic;  Laterality: N/A;    BREAST BIOPSY Left     MALIGNANT(LOW GRADE DUCTAL CARCINOMA)    BREAST LUMPECTOMY Left 2019    Procedure: Left breast needle localized lumpectomy,;  Surgeon: Beatriz Fuentes MD;  Location: [facility] OR [facility];  Service: General    CARDIAC CATHETERIZATION N/A 2023    Procedure: Coronary angiography;  Surgeon: Bakari Newman MD;  Location: [facility] CATH INVASIVE LOCATION;  Service: Cardiology;  Laterality: N/A;    CARDIAC CATHETERIZATION N/A 2023    Procedure: Left heart cath;  Surgeon: Bakari Newman MD;  Location: [facility] CATH INVASIVE LOCATION;  Service: Cardiology;  Laterality: N/A;    CARDIAC CATHETERIZATION  2023    CATARACT EXTRACTION, BILATERAL       SECTION      X 1    COLONOSCOPY      COLONOSCOPY N/A 2024    Procedure: COLONOSCOPY;  Surgeon: Kodi Novoa MD;  Location: [facility] ENDOSCOPY;  Service: Gastroenterology;  Laterality: N/A;  DIVERTICULOSIS  HEMORRHOIDS    HAND SURGERY Right     OVARIAN CYST REMOVAL       Social History     Socioeconomic History    Marital status:     Number of children: 2   Tobacco Use    Smoking status: Former     Current packs/day: 0.00     Average packs/day: 0.3 packs/day for 5.0 years (1.3 ttl pk-yrs)     Types: Cigarettes     Start date: 1977     Quit date: 1982     Years since quittin.5     Passive exposure: Past    Smokeless tobacco: Never    Tobacco comments:     Social smoker before    Vaping Use    Vaping status: Never Used   Substance  and Sexual Activity    Alcohol use: Yes     Comment: 1-2 DRINKS MONTHLY     Drug use: No    Sexual activity: Not Currently     Partners: Male     Birth control/protection: Post-menopausal     Family History   Problem Relation Age of Onset    Stroke Father     Hearing loss Father     Heart attack Father     Colon cancer Mother 89    Hearing loss Mother     Hypertension Mother     Diabetes Brother     Heart attack Brother     Depression Daughter     Breast cancer Maternal Aunt     Ovarian cancer Cousin     Cancer Cousin         FEMALE CANCER OF UNKNOWN ORGIN 60S    Malig Hyperthermia Neg Hx     Deep vein thrombosis Neg Hx     Pulmonary embolism Neg Hx     Uterine cancer Neg Hx        Results     Result Review   The following data was reviewed by: Harry Call MD on 06/27/2024:  Lab Results   Component Value Date    HGB 15.4 06/27/2024    HCT 47.2 (H) 06/27/2024    MCV 82.4 06/27/2024     06/27/2024    WBC 8.20 06/27/2024    NEUTROABS 4.80 06/27/2024    LYMPHSABS 2.74 06/27/2024    MONOSABS 0.51 06/27/2024    EOSABS 0.09 06/27/2024    BASOSABS 0.04 06/27/2024     Lab Results   Component Value Date    GLUCOSE 104 (H) 06/27/2024    BUN 17 06/27/2024    CREATININE 0.67 06/27/2024     06/27/2024    K 4.2 06/27/2024     06/27/2024    CO2 25.8 06/27/2024    CALCIUM 10.0 06/27/2024    PROTEINTOT 7.1 06/27/2024    ALBUMIN 4.4 06/27/2024    BILITOT 0.5 06/27/2024    ALKPHOS 94 06/27/2024    AST 19 06/27/2024    ALT 23 06/27/2024     Lab Results   Component Value Date    MG 3.0 (H) 10/04/2023    PHOS 3.6 10/04/2023       No radiology results for the last day       Assessment & Plan     Diagnoses and all orders for this visit:    1. Encounter for screening mammogram for breast cancer (Primary)  -     Mammo Screening Digital Tomosynthesis Bilateral With CAD; Future    2. Breast neoplasm, Tis (DCIS), left  -     anastrozole (ARIMIDEX) 1 MG tablet; Take 1 tablet by mouth Daily.  Dispense: 90 tablet; Refill:  0    3. Anemia, unspecified type  -     CBC & Differential; Future  -     Comprehensive Metabolic Panel; Future  -     Ferritin; Future  -     Iron Profile; Future  -     Vitamin B12; Future  -     Folate; Future  -     CBC & Differential; Future  -     Comprehensive Metabolic Panel; Future         Melina Martin is a 73 y.o. female who presents to [facility]AT[facility] ONCOL[facility]Y for Left Breast DCIS.    -currently on Anastrozole, due to stop on 9/17/2024, recommend patient continue this until then.  -last mammogram 1/2024 was BIRADS 2 benign due again January 2025  -Last DEXA scan was 9/21/2023 was normal, due again in 9/2024    Anemia  - Patient without labs since October 2023 where hemoglobin was 10.4, ordered recheck of CBC today which revealed white blood cell count of 8.2, hemoglobin 15.4, platelet count normal, unremarkable differential, CMP was also unremarkable from today, ferritin was elevated at 183.5, iron saturation 13% mildly decreased, suspect ferritin is likely reactive to benign cause, will recheck labs in 3 months at her follow-up.    -Plan for patient follow-up f/u after DEXA scan in 9/2024 with labs CBC, CMP, iron profile, ferritin, and Vit D    Please note that portions of this note were completed with a voice recognition program.    Electronically signed by Harry Call MD, 06/27/24, 5:18 PM EDT.    Assessment & Plan      Follow Up     I spent 30 minutes caring for Melina on this date of service. This time includes time spent by me in the following activities:preparing for the visit, reviewing tests, obtaining and/or reviewing a separately obtained history, performing a medically appropriate examination and/or evaluation , counseling and educating the patient/family/caregiver, ordering medications, tests, or procedures, referring and communicating with other health care professionals , documenting information in the medical record, independently interpreting results and  communicating that information with the patient/family/caregiver, and care coordination    Any chemotherapy or immunotherapy or other systemic therapy treatment plan involves a high risk of complications and/or mortality of patient management.    The patient was seen and examined. Work by the provider also included review and/or ordering of lab tests, review and/or ordering of radiology tests, review and/or ordering of medicine tests, discussion with other physicians or providers, independent review of data, obtaining old records, review/summation of old records, and/or other review.    I have reviewed the family history, social history, and past medical history for this patient. Previous information and data has been reviewed and updated as needed. I have reviewed and verified the chief complaint, history, and other documentation. The patient was interviewed and examined in the clinic and the chart reviewed. The previous observations, recommendations, and conclusions were reviewed including those of other providers.     The plan was discussed with the patient and/or family. The patient was given time to ask questions and these questions were answered. At the conclusion of their visit they had no additional questions or concerns and all questions were answered to their satisfaction.    Patient was given instructions and counseling regarding her condition or for health maintenance advice. Please see specific information pulled into the AVS if appropriate.       Patient or patient representative verbalized consent for the use of Ambient Listening during the visit with  Harry Call MD for chart documentation. 6/27/2024  17:18 EDT

## 2024-06-28 ENCOUNTER — TELEPHONE (OUTPATIENT)
Dept: ONCOLOGY | Facility: HOSPITAL | Age: 74
End: 2024-06-28
Payer: MEDICARE

## 2024-06-28 DIAGNOSIS — D64.9 ANEMIA, UNSPECIFIED TYPE: ICD-10-CM

## 2024-06-28 DIAGNOSIS — D05.12 BREAST NEOPLASM, TIS (DCIS), LEFT: Primary | ICD-10-CM

## 2024-06-28 RX ORDER — FERROUS GLUCONATE 324(38)MG
324 TABLET ORAL
Qty: 30 TABLET | Refills: 5 | Status: SHIPPED | OUTPATIENT
Start: 2024-06-28

## 2024-06-28 NOTE — TELEPHONE ENCOUNTER
Left message for patient, advised that the labs from yesterday show WBC 8.2, HGB 15.4, PLT normal and other wise unremarkable count differential and CMP. Advised that iron labs show mild iron deficiency and and oral iron supplement is recommended and being sent to the pharmacy on file. Instructed to take oral iron once daily, can cause constipation and that it is ok to take a stool softener, advised that the stool can become dark but that it should not be black or tarry. Instructed to maintain all follow up visits and to contact the office with any questions or concerns.

## 2024-07-03 NOTE — PROGRESS NOTES
RM:________     PCP: Maria Ines Nguyen APRN    : 1950  AGE: 73 y.o.  EST PATIENT     REASON FOR VISIT/  CC:        BP Readings from Last 3 Encounters:   24 144/70   24 133/61   23 143/58      Wt Readings from Last 3 Encounters:   24 81.8 kg (180 lb 6.4 oz)   24 79.8 kg (175 lb 14.8 oz)   23 82 kg (180 lb 12.4 oz)        WT: ____________ BP: __________L __________R HR______    CHEST PAIN: _____________    SOA: _____________PALPS: _______________     LIGHTHEADED: ___________FATIGUE: ________________ EDEMA __________    ALLERGIES:Patient has no known allergies. SMOKING HISTORY:  Social History     Tobacco Use    Smoking status: Former     Current packs/day: 0.00     Average packs/day: 0.3 packs/day for 5.0 years (1.3 ttl pk-yrs)     Types: Cigarettes     Start date: 1977     Quit date: 1982     Years since quittin.5     Passive exposure: Past    Smokeless tobacco: Never    Tobacco comments:     Social smoker before    Vaping Use    Vaping status: Never Used   Substance Use Topics    Alcohol use: Yes     Comment: 1-2 DRINKS MONTHLY     Drug use: No     CAFFEINE USE_________________  ALCOHOL ______________________

## 2024-07-10 ENCOUNTER — OFFICE VISIT (OUTPATIENT)
Dept: CARDIOLOGY | Facility: CLINIC | Age: 74
End: 2024-07-10
Payer: MEDICARE

## 2024-07-10 VITALS
BODY MASS INDEX: 31.41 KG/M2 | DIASTOLIC BLOOD PRESSURE: 70 MMHG | HEART RATE: 66 BPM | OXYGEN SATURATION: 94 % | SYSTOLIC BLOOD PRESSURE: 122 MMHG | HEIGHT: 64 IN | WEIGHT: 184 LBS

## 2024-07-10 DIAGNOSIS — Z95.2 STATUS POST AORTIC VALVE REPLACEMENT: Primary | ICD-10-CM

## 2024-07-10 DIAGNOSIS — I10 PRIMARY HYPERTENSION: ICD-10-CM

## 2024-07-10 DIAGNOSIS — I35.0 NONRHEUMATIC AORTIC VALVE STENOSIS: ICD-10-CM

## 2024-07-10 DIAGNOSIS — I25.10 NONOCCLUSIVE CORONARY ATHEROSCLEROSIS OF NATIVE CORONARY ARTERY: ICD-10-CM

## 2024-07-10 DIAGNOSIS — D05.12 BREAST NEOPLASM, TIS (DCIS), LEFT: ICD-10-CM

## 2024-07-10 DIAGNOSIS — I65.23 CAROTID ATHEROSCLEROSIS, BILATERAL: ICD-10-CM

## 2024-07-10 RX ORDER — AMOXICILLIN 500 MG/1
2000 CAPSULE ORAL SEE ADMIN INSTRUCTIONS
Qty: 4 CAPSULE | Refills: 3 | Status: SHIPPED | OUTPATIENT
Start: 2024-07-10

## 2024-07-10 NOTE — PROGRESS NOTES
Date of Office Visit: 07/10/24  Encounter Provider: Ortiz Jogrensen MD  Place of Service: [facility] CARDIOLOGY  Patient Name: Melina Martin  :1950    Chief Complaint   Patient presents with    Follow-up    Medication Reaction   :   HPI:     Ms. Martin is 73 y.o. and presents today in follow up. I have reviewed prior notes and there are no changes except for any new updates described below. I have also reviewed any information entered into the medical record by the patient or by ancillary staff.     She was diagnosed with left-sided DCIS in  and underwent lumpectomy followed by radiation therapy.  She has remained on anastrozole.  She did not require chemotherapy.     She was noted to have a murmur, and underwent serial echocardiograms which showed AS. This eventually progressed to severe AS in . Coronary angiography revealed 30% disease in the RCA. A carotid duplex showed mild atherosclerosis bilaterally (<50%). In 2023 she underwent mini-sternotomy and tissue AVR (21mm Magna). Her post-operative course was uneventful.     She is doing well.  She has generalized fatigue and was hoping to come off her beta-blocker.  She also inquires about her rosuvastatin dose.  She is walking every day and remaining active.    Past Medical History:   Diagnosis Date    Anxiety     Aortic stenosis     : s/p 21mm Magna tissue prosthesis and annular enlargement using pericardial patch prosthesis    Cataract     DCIS (ductal carcinoma in situ)     2019: left sided, lumpectomy/XRT/anastrozole    Encounter for screening for malignant neoplasm of colon 2024    GERD (gastroesophageal reflux disease)     History of chronic sinusitis     HTN (hypertension)     Hyperlipidemia     Nonocclusive coronary atherosclerosis of native coronary artery 07/10/2024    Status post aortic valve replacement 07/10/2024       Past Surgical History:   Procedure Laterality Date    AORTIC VALVE  REPAIR/REPLACEMENT N/A 10/4/2023    Procedure: CHELA; AORTIC VALVE REPAIR/REPLACEMENT with MINI J STERNOTOMY; ANWALL ROOT ENLARGEMENT; TRANSESOPHAGEAL ECHOCARDIOGRAM WITH ANESTHESIA;  Surgeon: Wali Boss MD;  Location: [facility] CVOR;  Service: Cardiothoracic;  Laterality: N/A;    BREAST BIOPSY Left     MALIGNANT(LOW GRADE DUCTAL CARCINOMA)    BREAST LUMPECTOMY Left 2019    Procedure: Left breast needle localized lumpectomy,;  Surgeon: Beatriz Fuentes MD;  Location: [facility] OR OSC;  Service: General    CARDIAC CATHETERIZATION N/A 2023    Procedure: Coronary angiography;  Surgeon: Bakari Newman MD;  Location: [facility] CATH INVASIVE LOC[facility];  Service: Cardiology;  Laterality: N/A;    CARDIAC CATHETERIZATION N/A 2023    Procedure: Left heart cath;  Surgeon: Bakari Newman MD;  [facility];  Service: Cardiology;  Laterality: N/A;    CARDIAC CATHETERIZATION  2023    CATARACT EXTRACTION, BILATERAL       SECTION      X 1    COLONOSCOPY      COLONOSCOPY N/A 2024    Procedure: COLONOSCOPY;  Surgeon: Kodi Novoa MD;  Location: [facility] ENDOSCOPY;  Service: Gastroenterology;  Laterality: N/A;  DIVERTICULOSIS  HEMORRHOIDS    HAND SURGERY Right     OVARIAN CYST REMOVAL         Social History     Socioeconomic History    Marital status:     Number of children: 2   Tobacco Use    Smoking status: Former     Current packs/day: 0.00     Average packs/day: 0.3 packs/day for 5.0 years (1.3 ttl pk-yrs)     Types: Cigarettes     Start date: 1977     Quit date: 1982     Years since quittin.5     Passive exposure: Past    Smokeless tobacco: Never    Tobacco comments:     Social smoker before    Vaping Use    Vaping status: Never Used   Substance and Sexual Activity    Alcohol use: Yes     Comment: 1-2 DRINKS MONTHLY     Drug use: No    Sexual activity: Not Currently     Partners: Male     Birth control/protection: Post-menopausal        Family History   Problem Relation Age of Onset    Stroke Father     Hearing loss Father     Heart attack Father     Colon cancer Mother 89    Hearing loss Mother     Hypertension Mother     Diabetes Brother     Heart attack Brother     Depression Daughter     Breast cancer Maternal Aunt     Ovarian cancer Cousin     Cancer Cousin         FEMALE CANCER OF UNKNOWN ORGIN 60S    Malig Hyperthermia Neg Hx     Deep vein thrombosis Neg Hx     Pulmonary embolism Neg Hx     Uterine cancer Neg Hx        Review of Systems   Constitutional: Positive for malaise/fatigue.   All other systems reviewed and are negative.      No Known Allergies      Current Outpatient Medications:     amLODIPine (NORVASC) 2.5 MG tablet, Take 1 tablet by mouth Daily., Disp: 90 tablet, Rfl: 2    anastrozole (ARIMIDEX) 1 MG tablet, Take 1 tablet by mouth Daily., Disp: 90 tablet, Rfl: 0    APPLE CIDER VINEGAR PO, Take 1 tablet by mouth Daily. PT HOLDING FOR SURGERY, Disp: , Rfl:     ASPIRIN 81 PO, Take 81 mg by mouth Daily., Disp: , Rfl:     Calcium Carb-Cholecalciferol (CALCIUM 500 + D PO), Take 1 tablet by mouth Daily., Disp: , Rfl:     chlorhexidine (PERIDEX) 0.12 % solution, RINSE WITH 1 TEASPOONFUL TWICE DAILY FOR 10 DAYS, Disp: , Rfl:     Coenzyme Q10 (CO Q 10 PO), Take 1 capsule by mouth Daily. PT HOLDING FOR SURGERY, Disp: , Rfl:     diphenhydrAMINE-APAP, sleep, (TYLENOL PM EXTRA STRENGTH PO), Take  by mouth Every Night., Disp: , Rfl:     Glucosamine-Chondroitin (OSTEO BI-FLEX REGULAR STRENGTH PO), Take 1 tablet by mouth Daily. PT HOLDING FOR SURGERY, Disp: , Rfl:     Melatonin 10 MG tablet, Take 1 tablet by mouth Every Night., Disp: , Rfl:     rosuvastatin (CRESTOR) 40 MG tablet, Take 1 tablet by mouth Every Night., Disp: , Rfl:     ferrous gluconate (FERGON) 324 MG tablet, Take 1 tablet by mouth Daily With Breakfast. (Patient not taking: Reported on 7/10/2024), Disp: 30 tablet, Rfl: 5    metoprolol tartrate (LOPRESSOR) 50 MG tablet, Take  "1 tablet by mouth Every 12 (Twelve) Hours. (Patient not taking: Reported on 7/10/2024), Disp: 180 tablet, Rfl: 2      Objective:     Vitals:    07/10/24 1143   BP: 122/70   Pulse: 66   SpO2: 94%   Weight: 83.5 kg (184 lb)   Height: 162.6 cm (64\")     Body mass index is 31.58 kg/m².    Vitals reviewed.   Constitutional:       Appearance: Well-developed and not in distress.   Eyes:      Conjunctiva/sclera: Conjunctivae normal.   HENT:      Head: Normocephalic.      Nose: Nose normal.   Neck:      Thyroid: Thyroid normal.      Vascular: No JVD. JVD normal.      Lymphadenopathy: No cervical adenopathy.   Pulmonary:      Effort: Pulmonary effort is normal.      Breath sounds: Normal breath sounds.   Cardiovascular:      Normal rate. Regular rhythm.      Murmurs: There is a grade 1/6 systolic murmur.   Pulses:     Intact distal pulses.   Edema:     Peripheral edema absent.   Abdominal:      Palpations: Abdomen is soft.      Tenderness: There is no abdominal tenderness.   Musculoskeletal: Normal range of motion.      Cervical back: Normal range of motion. Skin:     General: Skin is warm and dry.   Neurological:      General: No focal deficit present.      Mental Status: Oriented to person, place, and time.      Cranial Nerves: No cranial nerve deficit.   Psychiatric:         Behavior: Behavior normal.         Thought Content: Thought content normal.         Judgment: Judgment normal.           ECG 12 Lead    Date/Time: 7/10/2024 12:40 PM  Performed by: Ortiz Jorgensen MD    Authorized by: Ortiz Jorgensen MD  Comparison: compared with previous ECG   Similar to previous ECG  Rhythm: sinus rhythm  Conduction: right bundle branch block  QRS axis: left  Other findings: left ventricular hypertrophy with strain    Clinical impression: abnormal EKG            Assessment:       Diagnosis Plan   1. Status post aortic valve replacement  Adult Transthoracic Echo Complete W/ Cont if Necessary Per Protocol      2. Nonrheumatic aortic " valve stenosis  Adult Transthoracic Echo Complete W/ Cont if Necessary Per Protocol      3. Primary hypertension        4. Nonocclusive coronary atherosclerosis of native coronary artery        5. Carotid atherosclerosis, bilateral        6. Breast neoplasm, Tis (DCIS), left           Plan:       Ms Martin has a history of severe AS and is s/p tissue SAVR in October 2023. She's doing well.  She is on low-dose aspirin.  She does require SBE prophylaxis prior to dental procedures.  I will get a baseline postoperative echocardiogram.    Her BP is well controlled.  However, she has fatigue and inquired about coming off metoprolol.  She has not had any arrhythmias and I think this is reasonable.  She is going to wean her metoprolol down over the next 8 to 10 days.  If her blood pressures creep up, her amlodipine dose can be increased from 2.5 mg daily to 5 mg daily.    In 2023, she was noted to have a 30% lesion on the right coronary, and very mild atherosclerosis in both carotid arteries.  I think it would be reasonable for her rosuvastatin dose to be decreased to 20 mg daily given her most recent lipid panel findings but I will defer this to her primary care provider.  She will need a repeat carotid duplex in 2026.    She previously underwent left-sided radiation therapy for breast cancer which is likely the etiology of her valvular disease.  She is on anastrozole, but again, she does not have significant vascular disease.    Sincerely,       Ortiz Jorgensen MD

## 2024-07-10 NOTE — PATIENT INSTRUCTIONS
Cut metoprolol in half, take 1/2 tablet twice daily x 4 days. Then 1/2 tablet daily x 4 days. Then stop. If blood pressure goes up (consistently > 135 on the top) then double amlodipine to 5mg daily.

## 2024-07-10 NOTE — Clinical Note
I ordered an echo on her but she prefers [place]. I did order it as hospital-based and selected [place] as the region.  Vineet delgado

## 2024-07-12 ENCOUNTER — PATIENT MESSAGE (OUTPATIENT)
Dept: CARDIOLOGY | Facility: CLINIC | Age: 74
End: 2024-07-12
Payer: MEDICARE

## 2024-07-12 RX ORDER — AMLODIPINE BESYLATE 5 MG/1
5 TABLET ORAL DAILY
Qty: 30 TABLET | Refills: 3 | Status: SHIPPED | OUTPATIENT
Start: 2024-07-12

## 2024-07-12 NOTE — TELEPHONE ENCOUNTER
From: Melina Martin  To: Ortiz Jorgensen  Sent: 7/12/2024 12:04 PM EDT  Subject: Amlodpine    Dr jorgensen , began weaning process yesterday. This morn noticed bp a little high so went to 5 mg of amlodopine as you directed and that leveled it out. But I will run out ofAmlodopine quickly. New prescription please.Thanks

## 2024-07-21 DIAGNOSIS — D05.12 BREAST NEOPLASM, TIS (DCIS), LEFT: ICD-10-CM

## 2024-07-23 RX ORDER — ANASTROZOLE 1 MG/1
1 TABLET ORAL DAILY
Qty: 90 TABLET | Refills: 1 | Status: SHIPPED | OUTPATIENT
Start: 2024-07-23

## 2024-07-31 ENCOUNTER — HOSPITAL ENCOUNTER (OUTPATIENT)
Dept: CARDIOLOGY | Facility: HOSPITAL | Age: 74
Discharge: HOME | End: 2024-07-31
Admitting: INTERNAL MEDICINE
Payer: MEDICARE

## 2024-07-31 DIAGNOSIS — I35.0 NONRHEUMATIC AORTIC VALVE STENOSIS: ICD-10-CM

## 2024-07-31 DIAGNOSIS — Z95.2 STATUS POST AORTIC VALVE REPLACEMENT: ICD-10-CM

## 2024-07-31 LAB
ASCENDING AORTA: 3.6 CM
BH CV ECHO MEAS - AO MAX PG: 30.3 MMHG
BH CV ECHO MEAS - AO MEAN PG: 17.7 MMHG
BH CV ECHO MEAS - AO ROOT DIAM: 2.4 CM
BH CV ECHO MEAS - AO V2 MAX: 275.1 CM/SEC
BH CV ECHO MEAS - AO V2 VTI: 57.1 CM
BH CV ECHO MEAS - AVA(I,D): 1.3 CM2
BH CV ECHO MEAS - EDV(MOD-SP2): 31.7 ML
BH CV ECHO MEAS - EDV(MOD-SP4): 33.8 ML
BH CV ECHO MEAS - EF(MOD-SP2): 65.6 %
BH CV ECHO MEAS - EF(MOD-SP4): 72.2 %
BH CV ECHO MEAS - ESV(MOD-SP2): 10.9 ML
BH CV ECHO MEAS - ESV(MOD-SP4): 9.4 ML
BH CV ECHO MEAS - IVS/LVPW: 1.15 CM
BH CV ECHO MEAS - IVSD: 1.5 CM
BH CV ECHO MEAS - LA DIMENSION: 4.5 CM
BH CV ECHO MEAS - LAT PEAK E' VEL: 8.6 CM/SEC
BH CV ECHO MEAS - LV DIASTOLIC VOL/BSA (35-75): 18.3 CM2
BH CV ECHO MEAS - LV MAX PG: 5.8 MMHG
BH CV ECHO MEAS - LV MEAN PG: 3.3 MMHG
BH CV ECHO MEAS - LV SYSTOLIC VOL/BSA (12-30): 5.1 CM2
BH CV ECHO MEAS - LV V1 MAX: 120 CM/SEC
BH CV ECHO MEAS - LV V1 VTI: 22.6 CM
BH CV ECHO MEAS - LVIDD: 3.7 CM
BH CV ECHO MEAS - LVIDS: 2.1 CM
BH CV ECHO MEAS - LVOT DIAM: 2 CM
BH CV ECHO MEAS - LVPWD: 1.3 CM
BH CV ECHO MEAS - MED PEAK E' VEL: 6.76 CM/SEC
BH CV ECHO MEAS - MV A MAX VEL: 99 CM/SEC
BH CV ECHO MEAS - MV DEC SLOPE: 401 CM/SEC2
BH CV ECHO MEAS - MV DEC TIME: 256 SEC
BH CV ECHO MEAS - MV E MAX VEL: 87.2 CM/SEC
BH CV ECHO MEAS - MV E/A: 0.88
BH CV ECHO MEAS - MV MAX PG: 3.8 MMHG
BH CV ECHO MEAS - MV MEAN PG: 1.9 MMHG
BH CV ECHO MEAS - MV P1/2T: 72 MSEC
BH CV ECHO MEAS - MV V2 VTI: 30.7 CM
BH CV ECHO MEAS - MVA(P1/2T): 3.04 CM2
BH CV ECHO MEAS - RAP SYSTOLE: 3 MMHG
BH CV ECHO MEAS - RVDD: 2.25 CM
BH CV ECHO MEAS - RVSP: 29.3 MMHG
BH CV ECHO MEAS - SV(MOD-SP2): 20.8 ML
BH CV ECHO MEAS - SV(MOD-SP4): 24.4 ML
BH CV ECHO MEAS - SVI(MOD-SP2): 11.2 ML/M2
BH CV ECHO MEAS - SVI(MOD-SP4): 13.2 ML/M2
BH CV ECHO MEAS - TR MAX PG: 26.3 MMHG
BH CV ECHO MEAS - TR MAX VEL: 256.6 CM/SEC
BH CV ECHO MEASUREMENTS AVERAGE E/E' RATIO: 11.35

## 2024-07-31 PROCEDURE — 93306 TTE W/DOPPLER COMPLETE: CPT

## 2024-08-01 NOTE — PROGRESS NOTES
This was a baseline post-op echo. Let her know that her new valve looks great, I'm happy with these results.    satnam delgado

## 2024-10-10 ENCOUNTER — TRANSCRIBE ORDERS (OUTPATIENT)
Dept: ADMINISTRATIVE | Facility: HOSPITAL | Age: 74
End: 2024-10-10
Payer: MEDICARE

## 2024-10-10 DIAGNOSIS — Z12.31 SCREENING MAMMOGRAM, ENCOUNTER FOR: Primary | ICD-10-CM

## 2025-01-20 ENCOUNTER — HOSPITAL ENCOUNTER (OUTPATIENT)
Dept: MAMMOGRAPHY | Facility: HOSPITAL | Age: 75
Discharge: HOME | End: 2025-01-20
Admitting: OBSTETRICS & GYNECOLOGY
Payer: MEDICARE

## 2025-01-20 DIAGNOSIS — Z12.31 SCREENING MAMMOGRAM, ENCOUNTER FOR: ICD-10-CM

## 2025-01-20 PROCEDURE — 77063 BREAST TOMOSYNTHESIS BI: CPT

## 2025-01-20 PROCEDURE — 77067 SCR MAMMO BI INCL CAD: CPT

## 2025-01-20 NOTE — PROGRESS NOTES
Well Woman Visit    CC: Scheduled annual well gyn visit  Chief Complaint   Patient presents with    Annual Exam       HPI:   74 y.o.   Social History     Substance and Sexual Activity   Sexual Activity Not Currently    Partners: Male    Birth control/protection: Post-menopausal       74-year-old female G2, P2 status post menopause here for annual exam.  Patient has a history of breast cancer.  Patient does admit to some incontinence not urgency or stress but just notices that she feels a little bit more damper than she has in the past.  She needs her mammogram.  Her last colonoscopy was in 2024 it was negative and she  was told that she did not need any further colonoscopies.  Urinary QUALITY measure 66yo : Patient admits to some leakage of urine not associate with urgency or stress but just notices that she is more damp than she has been in the past.  Pt has no complaints today.    PCP: does manage PMHx and preventative labs  History: PMHx, Meds, Allergies, PSHx, Social Hx, and POBHx all reviewed and updated.    PHYSICAL EXAM:  /80   Pulse 66   Wt 83.5 kg (184 lb)   LMP  (LMP Unknown)   Breastfeeding No   BMI 31.58 kg/m²  Not found.  Chaperone present  Chaperone present during breast and/or pelvic exam if performed.  General- NAD, alert and oriented, appropriate  Psych- Normal mood, good memory  Neck- No masses, no thyroid enlargement  CV- Regular rhythm, no murmurs  Resp- CTA to bases, no wheezes  Abdomen- Soft, non distended, non tender, no masses    Breast left-  Bilaterally symmetrical, no masses, non tender, no nipple discharge, no axillary or supraclavicular nodes palpable.   Status post lumpectomy  Breast right- Bilaterally symmetrical, no masses, non tender, no nipple discharge, no axillary or supraclavicular nodes palpable.      External genitalia- Normal female, no lesions  Urethra/meatus- Normal, no masses, non tender  Bladder- Normal, no masses, non tender  Vagina- Normal, +  atrophy, no lesions, no discharge.      Cvx- Normal, no lesions, no discharge, No cervical motion tenderness  Uterus- Normal size, shape & consistency.  Non tender, mobile.  Adnexa- No mass, non tender  Anus/Rectum/Perineum- Not performed    Lymphatic- No palpable neck, axillary, or groin nodes  Ext- No edema, no cyanosis    Skin- No lesions, no rashes, no acanthosis nigricans      ASSESSMENT and PLAN:    Diagnoses and all orders for this visit:    1. Well woman exam with routine gynecological exam (Primary)    2. Encounter for screening mammogram for high-risk patient  -     Mammo Screening Digital Tomosynthesis Bilateral With CAD; Future    3. Continuous leakage of urine    4. History of left breast cancer        Preventative:  BREAST HEALTH- Monthly self breast exam importance and how to reviewed. MMG and/or MRI (prn) reviewed per society guidelines and her individual history. Screen: Updated today  CERVICAL CANCER Screening- Reviewed current ASCCP guidelines for screening w and wo cotest HPV, age specific.  Screen: Not medically needed  COLON CANCER Screening- Reviewed current medical society guidelines and options.  Screen:  Already up to date, Not medically needed  Follow up PCP/Specialist PMHx and/or Labs          She understands the importance of having any ordered tests to be performed in a timely fashion.  The risks of not performing them include, but are not limited to, advanced cancer stages, bone loss from osteoporosis and/or subsequent increase in morbidity and/or mortality.  She is encouraged to review her results online and/or contact or office if she has questions.     Follow Up:  Return in about 1 year (around 1/23/2026) for Annual physical.            Mere Pena,   01/23/2025    [facility]  [facility] [place] [address]  Dept: [phone]  Dept Fax: [phone]  Loc: [phone]  Loc Fax: [phone]

## 2025-01-23 ENCOUNTER — OFFICE VISIT (OUTPATIENT)
Dept: OBSTETRICS AND GYNECOLOGY | Facility: CLINIC | Age: 75
End: 2025-01-23
Payer: MEDICARE

## 2025-01-23 VITALS
HEART RATE: 66 BPM | BODY MASS INDEX: 31.58 KG/M2 | DIASTOLIC BLOOD PRESSURE: 80 MMHG | WEIGHT: 184 LBS | SYSTOLIC BLOOD PRESSURE: 143 MMHG

## 2025-01-23 DIAGNOSIS — N39.45 CONTINUOUS LEAKAGE OF URINE: ICD-10-CM

## 2025-01-23 DIAGNOSIS — Z01.419 WELL WOMAN EXAM WITH ROUTINE GYNECOLOGICAL EXAM: Primary | ICD-10-CM

## 2025-01-23 DIAGNOSIS — Z12.31 ENCOUNTER FOR SCREENING MAMMOGRAM FOR HIGH-RISK PATIENT: ICD-10-CM

## 2025-01-23 DIAGNOSIS — Z85.3 HISTORY OF LEFT BREAST CANCER: ICD-10-CM

## 2025-07-11 ENCOUNTER — OFFICE VISIT (OUTPATIENT)
Dept: CARDIOLOGY | Age: 75
End: 2025-07-11
Payer: MEDICARE

## 2025-07-11 VITALS
HEART RATE: 71 BPM | OXYGEN SATURATION: 96 % | WEIGHT: 182.8 LBS | HEIGHT: 64 IN | BODY MASS INDEX: 31.21 KG/M2 | DIASTOLIC BLOOD PRESSURE: 76 MMHG | SYSTOLIC BLOOD PRESSURE: 146 MMHG

## 2025-07-11 DIAGNOSIS — I10 PRIMARY HYPERTENSION: ICD-10-CM

## 2025-07-11 DIAGNOSIS — D05.12 BREAST NEOPLASM, TIS (DCIS), LEFT: ICD-10-CM

## 2025-07-11 DIAGNOSIS — I36.1 NONRHEUMATIC TRICUSPID VALVE REGURGITATION: ICD-10-CM

## 2025-07-11 DIAGNOSIS — I65.23 CAROTID ATHEROSCLEROSIS, BILATERAL: ICD-10-CM

## 2025-07-11 DIAGNOSIS — E78.2 MIXED HYPERLIPIDEMIA: ICD-10-CM

## 2025-07-11 DIAGNOSIS — I25.10 NONOCCLUSIVE CORONARY ATHEROSCLEROSIS OF NATIVE CORONARY ARTERY: ICD-10-CM

## 2025-07-11 DIAGNOSIS — I34.0 NONRHEUMATIC MITRAL VALVE REGURGITATION: ICD-10-CM

## 2025-07-11 DIAGNOSIS — I45.10 RBBB (RIGHT BUNDLE BRANCH BLOCK): ICD-10-CM

## 2025-07-11 DIAGNOSIS — I35.0 NONRHEUMATIC AORTIC VALVE STENOSIS: Primary | ICD-10-CM

## 2025-07-11 DIAGNOSIS — Z95.2 STATUS POST AORTIC VALVE REPLACEMENT: ICD-10-CM

## 2025-07-11 DIAGNOSIS — R94.31 ABNORMAL EKG: ICD-10-CM

## 2025-07-11 PROBLEM — E78.5 HYPERLIPIDEMIA LDL GOAL <100: Status: RESOLVED | Noted: 2021-11-04 | Resolved: 2025-07-11

## 2025-07-11 PROCEDURE — 1160F RVW MEDS BY RX/DR IN RCRD: CPT | Performed by: NURSE PRACTITIONER

## 2025-07-11 PROCEDURE — 3078F DIAST BP <80 MM HG: CPT | Performed by: NURSE PRACTITIONER

## 2025-07-11 PROCEDURE — 93000 ELECTROCARDIOGRAM COMPLETE: CPT | Performed by: NURSE PRACTITIONER

## 2025-07-11 PROCEDURE — 1159F MED LIST DOCD IN RCRD: CPT | Performed by: NURSE PRACTITIONER

## 2025-07-11 PROCEDURE — 99214 OFFICE O/P EST MOD 30 MIN: CPT | Performed by: NURSE PRACTITIONER

## 2025-07-11 PROCEDURE — 3077F SYST BP >= 140 MM HG: CPT | Performed by: NURSE PRACTITIONER

## 2025-07-11 NOTE — PROGRESS NOTES
Date of Office Visit: 2025  Encounter Provider: CARLOS Holly  Place of Service: [facility] CARDIOLOGY  Patient Name: Melina Martin  :1950  Primary Cardiologist: Dr. Ortiz Jorgensen    Chief Complaint   Patient presents with    Aortic Stenosis    Annual Exam   :     HPI: Melina Martin is a 74 y.o. female who presents today for annual cardiac follow-up visit.  I have reviewed her medical records.    She has been diagnosed with hypertension and hyperlipidemia.  In , she was diagnosed with breast cancer.  She underwent left-sided DCIS and lumpectomy followed by radiation therapy.  She has remained on anastrozole and did not require chemotherapy.    She has been told that she has had a heart murmur for many years.  In 2023, she was diagnosed with severe aortic stenosis.  She saw Dr. Jorgensen for a second opinion and Dr. Jorgensen felt that the aortic stenosis was due to radiation therapy.  Coronary angiogram showed normal coronary arteries except for proximal 30% RCA stenosis.  Carotid duplex showed mild plaque/stenosis bilaterally.     In 2023, she underwent aortic valve replacement with mini J sternotomy and root enlargement with pericardial patch by Dr. Wali Boss.      In 2024, echocardiogram revealed LVEF greater than 70%, grade 1 diastolic dysfunction, normal functioning bioprosthetic aortic valve, mild mitral regurgitation, and mild to moderate tricuspid regurgitation.    She presents today for follow-up visit with her daughter accompanying her.  She remains active and walks frequently.  She denies chest pain, shortness of air, palpitations, edema, dizziness, or syncope.  Blood pressure elevated today.  BPs at home running 126/70.  She is not taking her aspirin.      Past Medical History:   Diagnosis Date    Anxiety     Aortic stenosis     : s/p 21mm Magna tissue prosthesis and annular enlargement using pericardial patch prosthesis     Cataract     DCIS (ductal carcinoma in situ)     2019: left sided, lumpectomy/XRT/anastrozole    Encounter for screening for malignant neoplasm of colon 2024    GERD (gastroesophageal reflux disease)     History of chronic sinusitis     HTN (hypertension)     Hyperlipidemia     Nonocclusive coronary atherosclerosis of native coronary artery 07/10/2024    Status post aortic valve replacement 07/10/2024       Past Surgical History:   Procedure Laterality Date    AORTIC VALVE REPAIR/REPLACEMENT N/A 10/04/2023    Procedure: CHELA; AORTIC VALVE REPAIR/REPLACEMENT with MINI J STERNOTOMY; ANWALL ROOT ENLARGEMENT; TRANSESOPHAGEAL ECHOCARDIOGRAM WITH ANESTHESIA;  Surgeon: Wali Boss MD;  Location: [facility] CVOR;  Service: Cardiothoracic;  Laterality: N/A;    BREAST BIOPSY Left     MALIGNANT(LOW GRADE DUCTAL CARCINOMA)    BREAST LUMPECTOMY Left 2019    Procedure: Left breast needle localized lumpectomy,;  Surgeon: Beatriz Fuentes MD;  Location: [facility] OR [facility];  Service: General    CARDIAC CATHETERIZATION N/A 2023    Procedure: Coronary angiography;  Surgeon: Bakari Newman MD;  Location: [facility] CATH INVASIVE LOCATION;  Service: Cardiology;  Laterality: N/A;    CARDIAC CATHETERIZATION N/A 2023    Procedure: Left heart cath;  Surgeon: Bakari Newman MD;  Location: [facility] CATH INVASIVE LOCATION;  Service: Cardiology;  Laterality: N/A;    CATARACT EXTRACTION, BILATERAL       SECTION      X 1    COLONOSCOPY      COLONOSCOPY N/A 2024    Procedure: COLONOSCOPY;  Surgeon: Kodi Novoa MD;  Location: [facility] ENDOSCOPY;  Service: Gastroenterology;  Laterality: N/A;  DIVERTICULOSIS  HEMORRHOIDS    HAND SURGERY Right     OVARIAN CYST REMOVAL         Social History     Socioeconomic History    Marital status:     Number of children: 2   Tobacco Use    Smoking status: Former     Current packs/day: 0.00     Average packs/day: 0.3 packs/day for 5.0 years (1.3 ttl pk-yrs)      Types: Cigarettes     Start date: 1977     Quit date: 1982     Years since quittin.5     Passive exposure: Past    Smokeless tobacco: Never    Tobacco comments:     Social smoker before    Vaping Use    Vaping status: Never Used   Substance and Sexual Activity    Alcohol use: Yes     Comment: 1-2 DRINKS MONTHLY     Drug use: No    Sexual activity: Not Currently     Partners: Male     Birth control/protection: Post-menopausal       Family History   Problem Relation Age of Onset    Stroke Father     Hearing loss Father     Heart attack Father     Colon cancer Mother 89    Hearing loss Mother     Hypertension Mother     Diabetes Brother     Heart attack Brother     Depression Daughter     Breast cancer Maternal Aunt     Ovarian cancer Cousin     [place] Hyperthermia Neg Hx     Deep vein thrombosis Neg Hx     Pulmonary embolism Neg Hx     Uterine cancer Neg Hx        The following portion of the patient's history were reviewed and updated as appropriate: past medical history, past surgical history, past social history, past family history, allergies, current medications, and problem list.    Review of Systems   Constitutional: Negative.   Cardiovascular: Negative.    Respiratory: Negative.     Hematologic/Lymphatic: Negative.    Neurological: Negative.        No Known Allergies      Current Outpatient Medications:     amLODIPine (NORVASC) 5 MG tablet, Take 1 tablet by mouth Daily., Disp: 30 tablet, Rfl: 3    Calcium Carb-Cholecalciferol (CALCIUM 500 + D PO), Take 1 tablet by mouth Daily., Disp: , Rfl:     chlorhexidine (PERIDEX) 0.12 % solution, RINSE WITH 1 TEASPOONFUL TWICE DAILY FOR 10 DAYS, Disp: , Rfl:     Coenzyme Q10 (CO Q 10 PO), Take 1 capsule by mouth Daily. PT HOLDING FOR SURGERY, Disp: , Rfl:     Glucosamine-Chondroitin (OSTEO BI-FLEX REGULAR STRENGTH PO), Take 1 tablet by mouth Daily. PT HOLDING FOR SURGERY, Disp: , Rfl:     Melatonin 10 MG tablet, Take 1 tablet by mouth Every Night., Disp: ,  "Rfl:     metoprolol tartrate (LOPRESSOR) 50 MG tablet, Take 1 tablet by mouth Every 12 (Twelve) Hours. (Patient taking differently: Take 0.5 tablets by mouth Every 12 (Twelve) Hours. 0.5 tablet of 50mg), Disp: 180 tablet, Rfl: 2    rosuvastatin (CRESTOR) 40 MG tablet, Take 1 tablet by mouth Every Night., Disp: , Rfl:           Objective:     Vitals:    07/11/25 1149   BP: 146/76   BP Location: Left arm   Patient Position: Sitting   Cuff Size: Adult   Pulse: 71   SpO2: 96%   Weight: 82.9 kg (182 lb 12.8 oz)   Height: 162.6 cm (64.02\")       Body mass index is 31.36 kg/m².    PHYSICAL EXAM:    Vitals Reviewed.   General Appearance: No acute distress, well developed and well nourished.  Obese.  HENT: No hearing loss noted.    Respiratory: No signs of respiratory distress. Respiration rhythm and depth normal.  Clear to auscultation.   Cardiovascular:  Jugular Venous Pressure: Normal  Heart Rate and Rhythm: Normal, Heart Sounds: Normal S1 and S2. No S3 or S4 noted.  Murmurs: RUSB grade 1/6 systolic murmur present.   Lower Extremities: No edema noted.  Chest incision healing well.  Clean dry and intact.  Musculoskeletal: Normal movement of extremities.  Skin: General appearance normal.    Psychiatric: Patient alert and oriented to person, place, and time. Speech and behavior appropriate. Normal mood and affect.       ECG 12 Lead    Date/Time: 7/11/2025 11:43 AM  Performed by: Rachel Tavarez APRN    Authorized by: Rachel Tavarez APRN  Comparison: compared with previous ECG from 7/10/2024  Similar to previous ECG  Rhythm: sinus rhythm  Rate: normal  BPM: 71  Conduction: right bundle branch block and left anterior fascicular block  ST Depression: V3, V4, V5 and V6  T inversion: V4, V5, V6, I and aVL  QRS axis: normal  Other findings: T wave abnormality    Clinical impression: abnormal EKG            Assessment:       Diagnosis Plan   1. Nonrheumatic aortic valve stenosis  Adult Transthoracic Echo Complete w/ Color, " Spectral and Contrast if Necessary Per Protocol      2. Status post aortic valve replacement        3. Nonrheumatic mitral valve regurgitation        4. Nonrheumatic tricuspid valve regurgitation        5. Nonocclusive coronary atherosclerosis of native coronary artery        6. Abnormal EKG        7. RBBB (right bundle branch block)        8. Carotid atherosclerosis, bilateral  Duplex Carotid Ultrasound CAR      9. Primary hypertension        10. Mixed hyperlipidemia        11. Breast neoplasm, Tis (DCIS), left                 Plan:       1/2.  Severe aortic Stenosis: Most likely due to radiation therapy for breast cancer.  Status post tissue aortic valve replacement in October 2023.  Last echocardiogram in July 2024 showed normal functioning aortic valve.  Continue SBE prophylaxis.  Restart aspirin 81 mg daily.  Dr. Jorgensen recommended repeating echocardiogram in 2026.    3/4.  Mild mitral regurgitation and mild to moderate tricuspid regurgitation per echocardiogram in July 2024.     5.  Nonobstructive coronary artery disease noted per cath in 2023.  30% RCA stenosis.  Restart aspirin.  Continue rosuvastatin.  Risk factor modification discussed.    6/7. Abnormal EKG: She has a chronic right bundle branch block and LVH noted with T wave inversions.    8. Mild atherosclerosis of the carotid arteries.  Repeat carotid duplex in 2026.    9  Hypertension: Blood pressure well controlled.    10.  Hyperlipidemia: On rosuvastatin.    11.  History of breast cancer.  Remains on anastrozole.    12.  She will call with any cardiac concerns.  Follow-up with Dr. Jorgensen in 1 year and same-day echocardiogram to be completed.    As always, it has been a pleasure to participate in your patient's care. Thank you.         Sincerely,         CARLOS Vieyra  [facility] Cardiology      Dictated utilizing Dragon Dictation

## (undated) DEVICE — GLV SURG BIOGEL LTX PF 7

## (undated) DEVICE — SKIN PREP TRAY W/CHG: Brand: MEDLINE INDUSTRIES, INC.

## (undated) DEVICE — CANN RETRGR STYLET RSCP 15F

## (undated) DEVICE — SENSR CERBRL O2 PK/2

## (undated) DEVICE — SUT VIC 3/0 SH CR8 18IN J864D

## (undated) DEVICE — DRSNG WND GZ PAD BORDERED 4X8IN STRL

## (undated) DEVICE — DRP SLUSH WARMR MACH RECTG 66X44IN

## (undated) DEVICE — CATH ART FEM ST 18G 10.8CM

## (undated) DEVICE — LABEL SHEET CUSTOM 2X2 YELLOW: Brand: MEDLINE INDUSTRIES, INC.

## (undated) DEVICE — 8 FOOT DISPOSABLE EXTENSION CABLE WITH SAFE CONNECT / ALLIGATOR CLIP

## (undated) DEVICE — Device

## (undated) DEVICE — SPNG GZ WOVN 4X4IN 12PLY 10/BX STRL

## (undated) DEVICE — LINER SURG CANSTR SXN S/RIGD 1500CC

## (undated) DEVICE — ORGANIZER SUT SHELIGH 3T 213013

## (undated) DEVICE — GW EMR FIX EXCHG J STD .035 3MM 260CM

## (undated) DEVICE — SPONGE,DISSECTOR,K,XRAY,9/16"X1/4",STRL: Brand: MEDLINE

## (undated) DEVICE — GW HITORQUE/BAL MID/WT J W/HCOAT .014 3X190CM

## (undated) DEVICE — SPNG LAP 18X18IN LF STRL PK/5

## (undated) DEVICE — TTL1LYR 16FR10ML 100%SIL TMPST TR: Brand: MEDLINE

## (undated) DEVICE — BG TRANSF W/COUPLER SPK 600ML

## (undated) DEVICE — GLV SURG BIOGEL LTX PF 7 1/2

## (undated) DEVICE — CATH DIAG CARD PERFORMA JL3.5 BT 4F100CM

## (undated) DEVICE — CLAMP INSERT: Brand: STEALTH® CLAMP INSERT

## (undated) DEVICE — CONN JET HYDRA H20 AUXILIARY DISP

## (undated) DEVICE — PK CATH CARD 40

## (undated) DEVICE — CANN AORT ROOT DLP VNT/8IN 14G 7F

## (undated) DEVICE — CANN VESL CORNRY STR W/4MM BALN

## (undated) DEVICE — Device: Brand: DEFENDO AIR/WATER/SUCTION AND BIOPSY VALVE

## (undated) DEVICE — DRSNG SURESITE WNDW 2.38X2.75

## (undated) DEVICE — PK HEART OPN 40

## (undated) DEVICE — CONN STR 1/2INX3/8IN

## (undated) DEVICE — CANN ART EOPA 3D NV W/CONN 20F

## (undated) DEVICE — PK ATS CUST W CARDIOTOMY RESEVOIR

## (undated) DEVICE — CATH DIAG CARD PERFORM JR4.0 BT 4F100CM

## (undated) DEVICE — INTENDED FOR TISSUE SEPARATION, AND OTHER PROCEDURES THAT REQUIRE A SHARP SURGICAL BLADE TO PUNCTURE OR CUT.: Brand: BARD-PARKER ® CARBON RIB-BACK BLADES

## (undated) DEVICE — ADAPT ANTEGRADE RETRGR

## (undated) DEVICE — GLIDESHEATH BASIC HYDROPHILIC COATED INTRODUCER SHEATH: Brand: GLIDESHEATH

## (undated) DEVICE — SUT VIC 2/0 CT1 36IN

## (undated) DEVICE — GAUZE,SPONGE,FLUFF,6"X6.75",STRL,10/TRAY: Brand: MEDLINE

## (undated) DEVICE — ST. SORBAVIEW ULTIMATE IJ SYSTEM A,C: Brand: CENTURION

## (undated) DEVICE — AVID DUAL STAGE VENOUS DRAINAGE CANNULA: Brand: AVID DUAL STAGE VENOUS DRAINAGE CANNULA

## (undated) DEVICE — MARKR SKIN W/RULR AND LBL

## (undated) DEVICE — SOL ISO/ALC 70PCT 4OZ

## (undated) DEVICE — BIOPATCH™ ANTIMICROBIAL DRESSING WITH CHLORHEXIDINE GLUCONATE IS A HYDROPHILLIC POLYURETHANE ABSORPTIVE FOAM WITH CHLORHEXIDINE GLUCONATE (CHG) WHICH INHIBITS BACTERIAL GROWTH UNDER THE DRESSING. THE DRESSING IS INTENDED TO BE USED TO ABSORB EXUDATE, COVER A WOUND CAUSED BY VASCULAR AND NONVASCULAR PERCUTANEOUS MEDICAL DEVICES DURING SURGERY, AS WELL AS REDUCE LOCAL INFECTION AND COLONIZATION OF MICROORGANISMS.: Brand: BIOPATCH

## (undated) DEVICE — KT MANIFLD CARDIAC

## (undated) DEVICE — PK PERFUS CUST W/CARDIOPLEGIA

## (undated) DEVICE — ST TOURNI COMPL A/ 7IN

## (undated) DEVICE — SOLIDIFIER LIQLOC PLS 1500CC BT

## (undated) DEVICE — PK PROC MINOR TOWER 40

## (undated) DEVICE — SUT MNCRYL 4/0 PS2 18 IN

## (undated) DEVICE — TBG ART PRESS 60 IN

## (undated) DEVICE — DECANTER BAG 9": Brand: MEDLINE INDUSTRIES, INC.

## (undated) DEVICE — HEMOCONCENTRATOR PERFUS LPS06

## (undated) DEVICE — SYS PERFUS SEP PLATLT W TIPS CUST

## (undated) DEVICE — BNDG ELAS ELITE V/CLOSE 6IN 5YD LF STRL

## (undated) DEVICE — SOL IRRG H2O PL/BG 1000ML STRL

## (undated) DEVICE — IRRIGATOR BULB ASEPTO 60CC STRL